# Patient Record
Sex: FEMALE | Race: BLACK OR AFRICAN AMERICAN | Employment: FULL TIME | URBAN - METROPOLITAN AREA
[De-identification: names, ages, dates, MRNs, and addresses within clinical notes are randomized per-mention and may not be internally consistent; named-entity substitution may affect disease eponyms.]

---

## 2018-09-12 ENCOUNTER — HOSPITAL ENCOUNTER (EMERGENCY)
Age: 53
Discharge: HOME | End: 2018-09-12
Attending: EMERGENCY MEDICINE
Payer: MEDICAID

## 2018-09-12 VITALS
WEIGHT: 194.8 LBS | DIASTOLIC BLOOD PRESSURE: 79 MMHG | HEIGHT: 63 IN | SYSTOLIC BLOOD PRESSURE: 134 MMHG | RESPIRATION RATE: 16 BRPM | HEART RATE: 82 BPM | BODY MASS INDEX: 34.52 KG/M2 | OXYGEN SATURATION: 100 % | TEMPERATURE: 98.3 F

## 2018-09-12 DIAGNOSIS — H10.9 CONJUNCTIVITIS OF LEFT EYE, UNSPECIFIED CONJUNCTIVITIS TYPE: Primary | ICD-10-CM

## 2018-09-12 PROCEDURE — 99282 EMERGENCY DEPT VISIT SF MDM: CPT

## 2018-09-12 RX ORDER — TOBRAMYCIN 3 MG/ML
1 SOLUTION/ DROPS OPHTHALMIC
Qty: 5 ML | Refills: 0 | Status: SHIPPED | OUTPATIENT
Start: 2018-09-12 | End: 2018-09-17

## 2018-09-12 ASSESSMENT — PAIN DESCRIPTION - DESCRIPTORS: DESCRIPTORS: BURNING

## 2018-09-12 ASSESSMENT — VISUAL ACUITY
OD: 20/40
OS: 20/200
OU: 20/40

## 2018-09-12 ASSESSMENT — PAIN DESCRIPTION - PAIN TYPE: TYPE: ACUTE PAIN

## 2018-09-12 ASSESSMENT — PAIN SCALES - GENERAL: PAINLEVEL_OUTOF10: 8

## 2018-09-12 ASSESSMENT — PAIN DESCRIPTION - LOCATION: LOCATION: EYE

## 2018-09-12 NOTE — ED PROVIDER NOTES
TRIAGE CHIEF COMPLAINT:   Chief Complaint   Patient presents with    Conjunctivitis     redness drainage to left eye  granddaughter has pink eye         HPI: Janay Batista is a 52 y.o. female who presents to the Emergency Department with complaint of Waking up this morning with pain, irritation and crusting of the left eye. Yesterday she took her granddaughter to the doctor and her granddaughter has pinkeye. The patient does not wear contact lenses. She complains of some slight visual blurring in the left eye. Denies any symptoms in the right eye. No history of injury to her eye. Denies photophobia. Denies recent URI symptoms. The patient does were glasses for distant vision. REVIEW OF SYSTEMS:  6 systems reviewed. Pertinent positives per HPI. Otherwise noted to be negative. Nursing notes reviewed and agree with above. Past medical/surgical history reviewed. ALLERGIES   Allergies   Allergen Reactions    Latex     Doxycycline Anaphylaxis    Flagyl [Metronidazole] Anaphylaxis    Ibuprofen Anaphylaxis    Iv Dye [Iodides] Anaphylaxis         /79   Pulse 82   Temp 98.3 °F (36.8 °C) (Oral)   Resp 16   Ht 5' 3\" (1.6 m)   Wt 88.4 kg (194 lb 12.8 oz)   SpO2 100%   BMI 34.51 kg/m²   General:  No acute distress. Non toxic appearance  Head:   Normocephalic and atraumatic  Eyes:    EOM's intact. Sclera anicteric. LOLY. Mild conjunctival injection of the left eye without chemosis. No foreign body seen on full inspection. Dye shows no evidence of abrasion. No evidence of stye. ENT:   Mucous membranes moist  Neck:   Supple. No adenopathy or jugular venous distension  Lungs/Chest:  No respiratory distress  CVS:   Regular rate and rhythm  Abdomen:  Deferred  Extremities:  Full range of motion  Skin:   No rashes or lesions to exposed skin  Back:   Deferred  Neuro:  Alert and OX3. Speech clear. No focal weakness.         Gait normal.  Psych:   Affect normal. Mood normal        RADIOLOGY:  na    LAB      ED COURSE / MDM:  57-year-old female woke up this morning with some irritation, pain and crusting of the left eye. She states her granddaughter has pinkeye. She does not wear contact lenses. No history of injury. Visual acuity is 20/40 with glasses in each eye. No foreign body. No evidence of abrasion. No periorbital swelling. This may represent early conjunctivitis. Will treat with antibiotic drops, cool compresses and eye rest.      I discussed with Kashif Thomas the results of evaluation in the Emergency Department, diagnosis, care and prognosis. The plan is to discharge to home. The patient is in agreement with the plan and questions have been answered. I also discussed with Kashif Thomas the reasons which may require a return visit and the importance of follow-up care.         FINAL IMPRESSION:  1 -- Conjunctivitis left eye                     Honey Abrams MD  09/12/18 6701

## 2019-01-06 ENCOUNTER — HOSPITAL ENCOUNTER (EMERGENCY)
Age: 54
Discharge: HOME | End: 2019-01-06
Attending: EMERGENCY MEDICINE
Payer: MEDICAID

## 2019-01-06 ENCOUNTER — APPOINTMENT (OUTPATIENT)
Dept: GENERAL RADIOLOGY | Age: 54
End: 2019-01-06
Payer: MEDICAID

## 2019-01-06 VITALS
TEMPERATURE: 98.7 F | SYSTOLIC BLOOD PRESSURE: 133 MMHG | DIASTOLIC BLOOD PRESSURE: 74 MMHG | HEART RATE: 91 BPM | WEIGHT: 196 LBS | OXYGEN SATURATION: 98 % | HEIGHT: 63 IN | BODY MASS INDEX: 34.73 KG/M2
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
## 2019-01-06 DIAGNOSIS — S39.012A STRAIN OF LUMBAR REGION, INITIAL ENCOUNTER: ICD-10-CM

## 2019-01-06 DIAGNOSIS — M19.90 ARTHRITIS: ICD-10-CM

## 2019-01-06 DIAGNOSIS — M25.559 ARTHRALGIA OF HIP, UNSPECIFIED LATERALITY: ICD-10-CM

## 2019-01-06 PROCEDURE — 6370000000 HC RX 637 (ALT 250 FOR IP): Performed by: EMERGENCY MEDICINE

## 2019-01-06 PROCEDURE — 72100 X-RAY EXAM L-S SPINE 2/3 VWS: CPT

## 2019-01-06 PROCEDURE — 73502 X-RAY EXAM HIP UNI 2-3 VIEWS: CPT

## 2019-01-06 PROCEDURE — 99283 EMERGENCY DEPT VISIT LOW MDM: CPT

## 2019-01-06 RX ORDER — ACETAMINOPHEN 500 MG
1000 TABLET ORAL ONCE
Status: COMPLETED | OUTPATIENT
Start: 2019-01-06 | End: 2019-01-06

## 2019-01-06 RX ADMIN — ACETAMINOPHEN 1000 MG: 500 TABLET, FILM COATED ORAL at 19:15

## 2019-01-06 ASSESSMENT — PAIN DESCRIPTION - PAIN TYPE: TYPE: ACUTE PAIN

## 2019-01-06 ASSESSMENT — PAIN DESCRIPTION - ORIENTATION: ORIENTATION: LEFT

## 2019-01-06 ASSESSMENT — PAIN SCALES - GENERAL: PAINLEVEL_OUTOF10: 9

## 2019-01-06 ASSESSMENT — PAIN DESCRIPTION - DESCRIPTORS: DESCRIPTORS: TIGHTNESS

## 2019-01-06 ASSESSMENT — PAIN DESCRIPTION - LOCATION: LOCATION: BACK

## 2020-07-08 ENCOUNTER — APPOINTMENT (OUTPATIENT)
Dept: GENERAL RADIOLOGY | Age: 55
DRG: 192 | End: 2020-07-08
Payer: MEDICAID

## 2020-07-08 ENCOUNTER — APPOINTMENT (OUTPATIENT)
Dept: CT IMAGING | Age: 55
DRG: 192 | End: 2020-07-08
Payer: MEDICAID

## 2020-07-08 ENCOUNTER — HOSPITAL ENCOUNTER (INPATIENT)
Age: 55
LOS: 2 days | Discharge: HOME | DRG: 192 | End: 2020-07-13
Attending: EMERGENCY MEDICINE | Admitting: INTERNAL MEDICINE
Payer: MEDICAID

## 2020-07-08 PROBLEM — R07.9 CHEST PAIN: Status: ACTIVE | Noted: 2020-07-08

## 2020-07-08 LAB
A/G RATIO: 1.7 (ref 1.1–2.2)
ALBUMIN SERPL-MCNC: 4.6 G/DL (ref 3.4–5)
ALP BLD-CCNC: 84 U/L (ref 40–129)
ALT SERPL-CCNC: 16 U/L (ref 10–40)
ANION GAP SERPL CALCULATED.3IONS-SCNC: 13 MMOL/L (ref 3–16)
AST SERPL-CCNC: 20 U/L (ref 15–37)
BASOPHILS ABSOLUTE: 0 K/UL (ref 0–0.2)
BASOPHILS RELATIVE PERCENT: 0.7 %
BILIRUB SERPL-MCNC: 0.8 MG/DL (ref 0–1)
BUN BLDV-MCNC: 12 MG/DL (ref 7–20)
CALCIUM SERPL-MCNC: 9.7 MG/DL (ref 8.3–10.6)
CHLORIDE BLD-SCNC: 106 MMOL/L (ref 99–110)
CO2: 25 MMOL/L (ref 21–32)
CREAT SERPL-MCNC: 0.6 MG/DL (ref 0.6–1.1)
EOSINOPHILS ABSOLUTE: 0.2 K/UL (ref 0–0.6)
EOSINOPHILS RELATIVE PERCENT: 3 %
GFR AFRICAN AMERICAN: >60
GFR NON-AFRICAN AMERICAN: >60
GLOBULIN: 2.7 G/DL
GLUCOSE BLD-MCNC: 129 MG/DL (ref 70–99)
GLUCOSE BLD-MCNC: 187 MG/DL (ref 70–99)
HCT VFR BLD CALC: 35.2 % (ref 36–48)
HEMOGLOBIN: 11.7 G/DL (ref 12–16)
INR BLD: 1.05 (ref 0.86–1.14)
LYMPHOCYTES ABSOLUTE: 2.2 K/UL (ref 1–5.1)
LYMPHOCYTES RELATIVE PERCENT: 31.9 %
MCH RBC QN AUTO: 30.6 PG (ref 26–34)
MCHC RBC AUTO-ENTMCNC: 33.2 G/DL (ref 31–36)
MCV RBC AUTO: 92.2 FL (ref 80–100)
MONOCYTES ABSOLUTE: 0.5 K/UL (ref 0–1.3)
MONOCYTES RELATIVE PERCENT: 8 %
NEUTROPHILS ABSOLUTE: 3.8 K/UL (ref 1.7–7.7)
NEUTROPHILS RELATIVE PERCENT: 56.4 %
PDW BLD-RTO: 12.6 % (ref 12.4–15.4)
PERFORMED ON: ABNORMAL
PLATELET # BLD: 289 K/UL (ref 135–450)
PMV BLD AUTO: 8.3 FL (ref 5–10.5)
POTASSIUM SERPL-SCNC: 4.2 MMOL/L (ref 3.5–5.1)
PROTHROMBIN TIME: 12.2 SEC (ref 10–13.2)
RBC # BLD: 3.81 M/UL (ref 4–5.2)
SODIUM BLD-SCNC: 144 MMOL/L (ref 136–145)
TOTAL PROTEIN: 7.3 G/DL (ref 6.4–8.2)
TROPONIN: <0.01 NG/ML
WBC # BLD: 6.8 K/UL (ref 4–11)

## 2020-07-08 PROCEDURE — 71046 X-RAY EXAM CHEST 2 VIEWS: CPT

## 2020-07-08 PROCEDURE — 99285 EMERGENCY DEPT VISIT HI MDM: CPT

## 2020-07-08 PROCEDURE — 70450 CT HEAD/BRAIN W/O DYE: CPT

## 2020-07-08 PROCEDURE — 93005 ELECTROCARDIOGRAM TRACING: CPT | Performed by: EMERGENCY MEDICINE

## 2020-07-08 PROCEDURE — 85025 COMPLETE CBC W/AUTO DIFF WBC: CPT

## 2020-07-08 PROCEDURE — 85610 PROTHROMBIN TIME: CPT

## 2020-07-08 PROCEDURE — 80053 COMPREHEN METABOLIC PANEL: CPT

## 2020-07-08 PROCEDURE — U0003 INFECTIOUS AGENT DETECTION BY NUCLEIC ACID (DNA OR RNA); SEVERE ACUTE RESPIRATORY SYNDROME CORONAVIRUS 2 (SARS-COV-2) (CORONAVIRUS DISEASE [COVID-19]), AMPLIFIED PROBE TECHNIQUE, MAKING USE OF HIGH THROUGHPUT TECHNOLOGIES AS DESCRIBED BY CMS-2020-01-R: HCPCS

## 2020-07-08 PROCEDURE — G0378 HOSPITAL OBSERVATION PER HR: HCPCS

## 2020-07-08 PROCEDURE — 84484 ASSAY OF TROPONIN QUANT: CPT

## 2020-07-08 PROCEDURE — 6370000000 HC RX 637 (ALT 250 FOR IP): Performed by: EMERGENCY MEDICINE

## 2020-07-08 RX ORDER — CETIRIZINE HYDROCHLORIDE 10 MG/1
10 TABLET ORAL DAILY
Status: DISCONTINUED | OUTPATIENT
Start: 2020-07-09 | End: 2020-07-13 | Stop reason: HOSPADM

## 2020-07-08 RX ORDER — NITROGLYCERIN 0.4 MG/1
0.4 TABLET SUBLINGUAL EVERY 5 MIN PRN
Status: DISCONTINUED | OUTPATIENT
Start: 2020-07-08 | End: 2020-07-13 | Stop reason: HOSPADM

## 2020-07-08 RX ORDER — LOSARTAN POTASSIUM 100 MG/1
100 TABLET ORAL DAILY
Status: DISCONTINUED | OUTPATIENT
Start: 2020-07-09 | End: 2020-07-13 | Stop reason: HOSPADM

## 2020-07-08 RX ORDER — ASPIRIN 81 MG/1
324 TABLET, CHEWABLE ORAL ONCE
Status: COMPLETED | OUTPATIENT
Start: 2020-07-08 | End: 2020-07-08

## 2020-07-08 RX ORDER — TROPICAMIDE 10 MG/ML
1 SOLUTION/ DROPS OPHTHALMIC 3 TIMES DAILY
Status: DISCONTINUED | OUTPATIENT
Start: 2020-07-08 | End: 2020-07-08

## 2020-07-08 RX ORDER — POLYETHYLENE GLYCOL 3350 17 G/17G
17 POWDER, FOR SOLUTION ORAL DAILY PRN
Status: DISCONTINUED | OUTPATIENT
Start: 2020-07-08 | End: 2020-07-13 | Stop reason: HOSPADM

## 2020-07-08 RX ORDER — DULOXETIN HYDROCHLORIDE 60 MG/1
60 CAPSULE, DELAYED RELEASE ORAL 2 TIMES DAILY
Status: DISCONTINUED | OUTPATIENT
Start: 2020-07-08 | End: 2020-07-08

## 2020-07-08 RX ORDER — PREGABALIN 50 MG/1
50 CAPSULE ORAL 2 TIMES DAILY
Status: DISCONTINUED | OUTPATIENT
Start: 2020-07-08 | End: 2020-07-13 | Stop reason: HOSPADM

## 2020-07-08 RX ORDER — ASPIRIN 81 MG/1
81 TABLET, CHEWABLE ORAL DAILY
COMMUNITY

## 2020-07-08 RX ORDER — DOCUSATE SODIUM 100 MG/1
100 CAPSULE, LIQUID FILLED ORAL 3 TIMES DAILY
Status: DISCONTINUED | OUTPATIENT
Start: 2020-07-08 | End: 2020-07-13 | Stop reason: HOSPADM

## 2020-07-08 RX ORDER — LATANOPROST 50 UG/ML
1 SOLUTION/ DROPS OPHTHALMIC NIGHTLY
Status: DISCONTINUED | OUTPATIENT
Start: 2020-07-08 | End: 2020-07-08

## 2020-07-08 RX ORDER — DEXTROSE MONOHYDRATE 25 G/50ML
12.5 INJECTION, SOLUTION INTRAVENOUS PRN
Status: DISCONTINUED | OUTPATIENT
Start: 2020-07-08 | End: 2020-07-13 | Stop reason: HOSPADM

## 2020-07-08 RX ORDER — DEXTROSE MONOHYDRATE 50 MG/ML
100 INJECTION, SOLUTION INTRAVENOUS PRN
Status: DISCONTINUED | OUTPATIENT
Start: 2020-07-08 | End: 2020-07-13 | Stop reason: HOSPADM

## 2020-07-08 RX ORDER — HYDROCHLOROTHIAZIDE 25 MG/1
25 TABLET ORAL DAILY
Status: DISCONTINUED | OUTPATIENT
Start: 2020-07-09 | End: 2020-07-13 | Stop reason: HOSPADM

## 2020-07-08 RX ORDER — PROMETHAZINE HYDROCHLORIDE 25 MG/1
12.5 TABLET ORAL EVERY 6 HOURS PRN
Status: DISCONTINUED | OUTPATIENT
Start: 2020-07-08 | End: 2020-07-13 | Stop reason: HOSPADM

## 2020-07-08 RX ORDER — MORPHINE SULFATE 2 MG/ML
2 INJECTION, SOLUTION INTRAMUSCULAR; INTRAVENOUS EVERY 4 HOURS PRN
Status: ACTIVE | OUTPATIENT
Start: 2020-07-08 | End: 2020-07-09

## 2020-07-08 RX ORDER — SODIUM CHLORIDE 0.9 % (FLUSH) 0.9 %
10 SYRINGE (ML) INJECTION PRN
Status: DISCONTINUED | OUTPATIENT
Start: 2020-07-08 | End: 2020-07-13 | Stop reason: HOSPADM

## 2020-07-08 RX ORDER — INSULIN LISPRO 100 [IU]/ML
0-6 INJECTION, SOLUTION INTRAVENOUS; SUBCUTANEOUS
Status: DISCONTINUED | OUTPATIENT
Start: 2020-07-09 | End: 2020-07-10

## 2020-07-08 RX ORDER — SODIUM CHLORIDE 0.9 % (FLUSH) 0.9 %
10 SYRINGE (ML) INJECTION EVERY 12 HOURS SCHEDULED
Status: DISCONTINUED | OUTPATIENT
Start: 2020-07-08 | End: 2020-07-13 | Stop reason: HOSPADM

## 2020-07-08 RX ORDER — NICOTINE POLACRILEX 4 MG
15 LOZENGE BUCCAL PRN
Status: DISCONTINUED | OUTPATIENT
Start: 2020-07-08 | End: 2020-07-13 | Stop reason: HOSPADM

## 2020-07-08 RX ORDER — ONDANSETRON 2 MG/ML
4 INJECTION INTRAMUSCULAR; INTRAVENOUS EVERY 6 HOURS PRN
Status: DISCONTINUED | OUTPATIENT
Start: 2020-07-08 | End: 2020-07-13 | Stop reason: HOSPADM

## 2020-07-08 RX ORDER — BUDESONIDE AND FORMOTEROL FUMARATE DIHYDRATE 160; 4.5 UG/1; UG/1
2 AEROSOL RESPIRATORY (INHALATION) 2 TIMES DAILY
Status: DISCONTINUED | OUTPATIENT
Start: 2020-07-08 | End: 2020-07-08

## 2020-07-08 RX ORDER — PANTOPRAZOLE SODIUM 40 MG/1
40 TABLET, DELAYED RELEASE ORAL
Status: DISCONTINUED | OUTPATIENT
Start: 2020-07-09 | End: 2020-07-13 | Stop reason: HOSPADM

## 2020-07-08 RX ORDER — ASPIRIN 81 MG/1
81 TABLET, CHEWABLE ORAL DAILY
Status: DISCONTINUED | OUTPATIENT
Start: 2020-07-09 | End: 2020-07-13 | Stop reason: HOSPADM

## 2020-07-08 RX ORDER — ACETAMINOPHEN 650 MG/1
650 SUPPOSITORY RECTAL EVERY 6 HOURS PRN
Status: DISCONTINUED | OUTPATIENT
Start: 2020-07-08 | End: 2020-07-13 | Stop reason: SDUPTHER

## 2020-07-08 RX ORDER — ACETAMINOPHEN 325 MG/1
650 TABLET ORAL EVERY 6 HOURS PRN
Status: DISCONTINUED | OUTPATIENT
Start: 2020-07-08 | End: 2020-07-13 | Stop reason: SDUPTHER

## 2020-07-08 RX ORDER — MONTELUKAST SODIUM 10 MG/1
10 TABLET ORAL NIGHTLY
Status: DISCONTINUED | OUTPATIENT
Start: 2020-07-08 | End: 2020-07-08

## 2020-07-08 RX ORDER — ATORVASTATIN CALCIUM 10 MG/1
10 TABLET, FILM COATED ORAL NIGHTLY
Status: DISCONTINUED | OUTPATIENT
Start: 2020-07-08 | End: 2020-07-10

## 2020-07-08 RX ORDER — INSULIN LISPRO 100 [IU]/ML
0-3 INJECTION, SOLUTION INTRAVENOUS; SUBCUTANEOUS NIGHTLY
Status: DISCONTINUED | OUTPATIENT
Start: 2020-07-08 | End: 2020-07-10

## 2020-07-08 RX ORDER — TRAZODONE HYDROCHLORIDE 50 MG/1
50 TABLET ORAL NIGHTLY
Status: DISCONTINUED | OUTPATIENT
Start: 2020-07-08 | End: 2020-07-13 | Stop reason: HOSPADM

## 2020-07-08 RX ADMIN — NITROGLYCERIN 1 INCH: 20 OINTMENT TOPICAL at 18:36

## 2020-07-08 RX ADMIN — ASPIRIN 324 MG: 81 TABLET, CHEWABLE ORAL at 18:37

## 2020-07-08 ASSESSMENT — PAIN SCALES - GENERAL
PAINLEVEL_OUTOF10: 0
PAINLEVEL_OUTOF10: 0

## 2020-07-08 NOTE — ED NOTES
Report to [facility].      Leeann Figueroa RN  07/08/20 4920

## 2020-07-08 NOTE — ED PROVIDER NOTES
[facility]  EMERGENCY DEPT VISIT      Patient Identification  Anastcaia Burgess is a 60 y.o. female. Chief Complaint   Chest Pain      History of Present Illness: This is a  60 y.o. female who presents ambulatory to the ED with complaints of 1 week history of off-and-on left-sided chest pain which radiates through to her shoulder blade. This is been associated with left-sided neck pain which radiates down to the left shoulder and into the arm. She states that her left arm sometimes feels numb and as of her hand is weak. She denies any paresthesias or weakness to her face or leg. Symptoms only involve the left arm. She denies visual changes. No trouble talking. No vertigo or gait disturbance. Patient denies any exacerbating or relieving factors for chest pain or arm symptoms but they do seem to come together. She denies any headache. No diaphoresis. No nausea or vomiting. Patient does have history of hypertension, high cholesterol, and diabetes, polyneuropathy, as well as family history of heart disease. She does not smoke. She states that she was told that she had a mini stroke in the past with right sided numbness but also reports a history of what sounds like possible complicated migraines. Chest pain symptoms last for several hours at a time. Currently her chest pain is rated 2 out of 10. She denies any URI symptoms or fever. Past Medical History:   Diagnosis Date    Asthma     Bipolar 1 disorder ([address]     Chronic back pain     DDD (degenerative disc disease)     Depression     Diabetes mellitus (HCC)     Fibromyalgia     Hyperlipidemia     Hypertension        Past Surgical History:   Procedure Laterality Date    CARPAL TUNNEL RELEASE      HAND SURGERY      HYSTERECTOMY         No current facility-administered medications for this encounter.      Current Outpatient Medications:     Mometasone Furo-Formoterol Fum (DULERA IN), Inhale into the lungs, Disp: , Rfl:     aspirin 81 MG chewable tablet, Take 81 mg by mouth daily, Disp: , Rfl:     pregabalin (LYRICA) 50 MG capsule, Take 50 mg by mouth 2 times daily. ., Disp: , Rfl:     atorvastatin (LIPITOR) 10 MG tablet, Take 10 mg by mouth daily, Disp: , Rfl:     sucralfate (CARAFATE) 1 GM tablet, Take 1 tablet by mouth 4 times daily, Disp: 40 tablet, Rfl: 0    insulin glargine (LANTUS) 100 UNIT/ML injection vial, Inject 43 Units into the skin nightly (Patient taking differently: Inject 55 Units into the skin nightly ), Disp: 1 vial, Rfl: 3    insulin lispro (HUMALOG KWIKPEN) 100 UNIT/ML pen, Inject 8 Units into the skin 3 times daily (before meals) Please dispense appropriate amount of pen needles, Disp: 5 Pen, Rfl: 1    metFORMIN (GLUCOPHAGE) 500 MG tablet, Take 2 tablets by mouth 2 times daily (with meals). , Disp: 60 tablet, Rfl: 3    albuterol (PROVENTIL HFA;VENTOLIN HFA) 108 (90 BASE) MCG/ACT inhaler, Inhale 2 puffs into the lungs every 6 hours as needed for Wheezing., Disp: , Rfl:     DULoxetine (CYMBALTA) 60 MG capsule, Take 60 mg by mouth 2 times daily. , Disp: , Rfl:     hydrochlorothiazide (HYDRODIURIL) 25 MG tablet, Take 25 mg by mouth daily. , Disp: , Rfl:     losartan (COZAAR) 100 MG tablet, Take 100 mg by mouth daily. , Disp: , Rfl:     meclizine (ANTIVERT) 25 MG tablet, Take 25 mg by mouth 3 times daily as needed. , Disp: , Rfl:     montelukast (SINGULAIR) 10 MG tablet, Take 10 mg by mouth nightly., Disp: , Rfl:     tiotropium (SPIRIVA) 18 MCG inhalation capsule, Inhale 18 mcg into the lungs daily. , Disp: , Rfl:     spironolactone (ALDACTONE) 50 MG tablet, Take 50 mg by mouth 2 times daily. , Disp: , Rfl:     traZODone (DESYREL) 50 MG tablet, Take 50 mg by mouth nightly., Disp: , Rfl:     cetirizine (ZYRTEC) 10 MG tablet, Take 10 mg by mouth daily, Disp: , Rfl:     beclomethasone (QVAR) 80 MCG/ACT inhaler, Inhale 2 puffs into the lungs 2 times daily. , Disp: , Rfl:     docusate sodium (COLACE) 100 MG capsule, Take 100 mg Minutes per session: Not on file    Stress: Not on file   Relationships    Social connections     Talks on phone: Not on file     Gets together: Not on file     Attends Orthodoxy service: Not on file     Active member of club or organization: Not on file     Attends meetings of clubs or organizations: Not on file     Relationship status: Not on file    Intimate partner violence     Fear of current or ex partner: Not on file     Emotionally abused: Not on file     Physically abused: Not on file     Forced sexual activity: Not on file   Other Topics Concern    Not on file   Social History Narrative    Not on file       Nursing Notes Reviewed      ROS:  GENERAL:  No fever, no chills, no diaphoresis, no appetite changes  EYES: no eye discharge, no eye redness, no visual changes  ENT: no nasal congestion, no sore throat  CARDIAC: + chest pain,  no leg swelling  PULM: no cough, no shortness of breath  ABD: no abdominal pain, no nausea, no vomiting, no diarrhea  : no dysuria, no hematuria, no urgency, no frequency. No flank pain  MUSCULOSKELETAL: + back pain, no arthralgias, no myalgias  NEURO: no headache, no lightheadedness, no dizziness, + numbness, + weakness, no syncope, no confusion, no speech difficulty  SKIN: no rashes, no erythema, no wounds, no ecchymosis      PHYSICAL EXAM:  GENERAL APPEARANCE: Nenita Emmanuel is in no acute respiratory distress. Awake and alert. VITAL SIGNS:   ED Triage Vitals [07/08/20 1725]   Enc Vitals Group      BP (!) 158/102      Pulse 75      Resp 18      Temp 98.7 °F (37.1 °C)      Temp Source Oral      SpO2 100 %      Weight 190 lb 12.8 oz (86.5 kg)      Height 5' 2\" (1.575 m)      Head Circumference       Peak Flow       Pain Score       Pain Loc       Pain Edu? Excl. in [address] HEAD: Normocephalic, atraumatic. EYES:  Extraocular muscles are intact. Pupils equal round and reactive to light. Conjunctivas are pink. Negative scleral icterus.    ENT:  Mucous membranes are moist.  Pharynx without erythema or exudates. NECK: Nontender and supple. No cervical adenopathy. CHEST:  Clear to auscultation bilaterally. No rales, rhonchi, or wheezing. HEART:  Regular rate and regular rhythm. No murmurs. Strong and equal pulses in the upper and lower extremities. ABDOMEN: Soft,  nondistended, positive bowel sounds. abdomen is nontender. No rebound. no guarding. MUSCULOSKELETAL: The calves are nontender to palpation. Active range of motion of the upper and lower extremities. No edema. NEUROLOGICAL: Awake, alert and oriented x 3. Power intact in the upper and lower extremities but with very scant lesser left  strength. Left biceps/triceps, shoulder strength 5/5. Sensation is intact to light touch in the upper and lower extremities and all fingers. Cranial Nerves 2-12 are intact. No truncal ataxia. No dysarthria or aphasia. Normal finger to nose. NIH Stroke Scale   Time: 1750  Person Administering Scale: MARK SANDHU   Administer stroke scale items in the order listed. Record performance in each category after each subscale exam. Do not go back and change scores. Follow directions provided for each exam technique. Scores should reflect what the patient does, not what the clinician thinks the patient can do. The clinician should record answers while administering the exam and work quickly. Except where indicated, the patient should not be coached (i.e., repeated requests to patient to make a special effort). 1a  Level of consciousness: 0=alert; keenly responsive   1b. LOC questions:  0=Performs both tasks correctly   1c. LOC commands: 0=Performs both tasks correctly   2. Best Gaze: 0=normal   3. Visual: 0=No visual loss   4. Facial Palsy: 0=Normal symmetric movement   5a. Motor left arm: 0=No drift, limb holds 90 (or 45) degrees for full 10 seconds   5b.   Motor right arm: 0=No drift, limb holds 90 (or 45) degrees for full 10 seconds   6a. motor left le=No drift, limb holds 90 (or 45) degrees for full 10 seconds   6b  Motor right le=No drift, limb holds 90 (or 45) degrees for full 10 seconds   7. Limb Ataxia: 0=Absent   8. Sensory: 0=Normal; no sensory loss   9. Best Language:  0=No aphasia, normal   10. Dysarthria: 0=Normal   11. Extinction and Inattention: 0=No abnormality         Total:  0       DERMATOLOGIC: No petechiae, rashes, or ecchymoses. No erythema. PSYCH: normal mood and affect. Normal thought content. ED COURSE AND MEDICAL DECISION MAKING:    EKG as interpreted by myself:  normal sinus rhythm with a rate of 75  Axis is   Normal  QTc is  normal  Intervals and Durations are unremarkable. No specific ST-T wave changes appreciated. No evidence of acute ischemia. Compared to prior EKG dated 18, no significant change    Radiology:  Films have been read by radiologist as noted in chart unless otherwise stated. Other radiologic studies (i.e. CT, MRI, ultrasounds, etc ) have been interpreted by radiologist.     Euell Postin   Final Result   1. Normal noncontrast CT scan of the head. XR CHEST STANDARD (2 VW)   Final Result   1. No evidence of acute cardiopulmonary disease.            Labs:  Results for orders placed or performed during the hospital encounter of 20   CBC Auto Differential   Result Value Ref Range    WBC 6.8 4.0 - 11.0 K/uL    RBC 3.81 (L) 4.00 - 5.20 M/uL    Hemoglobin 11.7 (L) 12.0 - 16.0 g/dL    Hematocrit 35.2 (L) 36.0 - 48.0 %    MCV 92.2 80.0 - 100.0 fL    MCH 30.6 26.0 - 34.0 pg    MCHC 33.2 31.0 - 36.0 g/dL    RDW 12.6 12.4 - 15.4 %    Platelets 177 662 - 007 K/uL    MPV 8.3 5.0 - 10.5 fL    Neutrophils % 56.4 %    Lymphocytes % 31.9 %    Monocytes % 8.0 %    Eosinophils % 3.0 %    Basophils % 0.7 %    Neutrophils Absolute 3.8 1.7 - 7.7 K/uL    Lymphocytes Absolute 2.2 1.0 - 5.1 K/uL    Monocytes Absolute 0.5 0.0 - 1.3 K/uL    Eosinophils Absolute 0.2 0.0 - 0.6 K/uL    Basophils Absolute 0.0 0.0 - 0.2 K/uL Comprehensive Metabolic Panel   Result Value Ref Range    Sodium 144 136 - 145 mmol/L    Potassium 4.2 3.5 - 5.1 mmol/L    Chloride 106 99 - 110 mmol/L    CO2 25 21 - 32 mmol/L    Anion Gap 13 3 - 16    Glucose 187 (H) 70 - 99 mg/dL    BUN 12 7 - 20 mg/dL    CREATININE 0.6 0.6 - 1.1 mg/dL    GFR Non-African American >60 >60    GFR African American >60 >60    Calcium 9.7 8.3 - 10.6 mg/dL    Total Protein 7.3 6.4 - 8.2 g/dL    Alb 4.6 3.4 - 5.0 g/dL    Albumin/Globulin Ratio 1.7 1.1 - 2.2    Total Bilirubin 0.8 0.0 - 1.0 mg/dL    Alkaline Phosphatase 84 40 - 129 U/L    ALT 16 10 - 40 U/L    AST 20 15 - 37 U/L    Globulin 2.7 g/dL   Troponin   Result Value Ref Range    Troponin <0.01 <0.01 ng/mL   Protime-INR   Result Value Ref Range    Protime 12.2 10.0 - 13.2 sec    INR 1.05 0.86 - 1.14   EKG 12 Lead   Result Value Ref Range    Ventricular Rate 75 BPM    Atrial Rate 75 BPM    P-R Interval 160 ms    QRS Duration 94 ms    Q-T Interval 400 ms    QTc Calculation (Bazett) 446 ms    P Axis 69 degrees    R Axis 10 degrees    T Axis 34 degrees    Diagnosis Normal sinus rhythmNormal ECG        Treatment in the department:  Patient received the following while in the ED. Medications   aspirin chewable tablet 324 mg (324 mg Oral Given 7/8/20 1837)   nitroglycerin (NITRO-BID) 2 % ointment 1 inch (1 inch Topical Given 7/8/20 1836)         Medical decision making:  Patient presents emergency department with off-and-on chest pain radiating to the back and left arm for 1 week. She has a heart score of 4. Multiple risk factors. No recent stress test.  No prior angiogram.  She is hemodynamically stable. EKG is nonischemic. Initial troponin is negative. It is intermittent pain however. I feel she would benefit from stress testing. Patient does report some numbness to the left arm associated with the chest pain as well as a feeling of weakness in the left hand.   Neurologic exam however shows intact sensation with this and only scant decreased  strength on the left with all other left sided strength intact and symmetric. CT of the head was unremarkable. I think this is unlikely to be stroke and again the symptoms been waxing and waning for 1 week. She has a history of polyneuropathy as well as cervical disc disease which could also be contributing or this could be referred pain from the chest pain. HEART SCORE:    History: +1 for moderate suspicion  EKG: +0 for normal EKG   Age: +1 for age 44-72 years  Risk factors (includes HLD, HTN, DM, tobacco use, obesity, and +FHx): +2 for known CAD or 3+ risk factors  Initial troponin: +0 for negative troponin    Heart score: 4. This falls under the following category: Score of 4-6, which indicates low/moderate risk for major adverse cardiac event and supports observation with repeated troponins and/or non-invasive testing      I spoke with Dr. David Roldan. We thoroughly discussed the history, physical exam, laboratory and imaging studies, as well as, emergency department course. Based upon that discussion, we've decided to [facility] for further observation and evaluation ofEna Jiang's chest pain. As I have deemed necessary from their history, physical, and studies, I have considered and evaluatedEna Matthews for the following diagnoses:  ACUTE CORONARY SYNDROME, PERICARDIAL TAMPONADE, PNEUMOTHORAX, PULMONARY EMBOLISM, and THORACIC DISSECTION, PNEUMONIA, PERICARDITIS. Clinical Impression:  1. Chest pain, unspecified type        Dispo:  Patient will be  admitted at this time. Patient was informed of this decision and agrees with plan. I have discussed lab and xray findings with patient and they understand. Questions were answered to the best of my ability. Discharge vitals:  Blood pressure (!) 152/65, pulse 62, temperature 98.7 °F (37.1 °C), temperature source Oral, resp.  rate 17, height 5' 2\" (1.575 m), weight 86.5 kg (190 lb 12.8 oz), SpO2 100 %, not currently breastfeeding. Prescriptions given:   New Prescriptions    No medications on file       This chart was created using Dragon voice recognition software.         Mario Riddle MD  07/08/20 2848

## 2020-07-09 LAB
ANION GAP SERPL CALCULATED.3IONS-SCNC: 10 MMOL/L (ref 3–16)
BUN BLDV-MCNC: 11 MG/DL (ref 7–20)
CALCIUM SERPL-MCNC: 9.3 MG/DL (ref 8.3–10.6)
CHLORIDE BLD-SCNC: 105 MMOL/L (ref 99–110)
CHOLESTEROL, TOTAL: 152 MG/DL (ref 0–199)
CO2: 24 MMOL/L (ref 21–32)
CREAT SERPL-MCNC: 0.7 MG/DL (ref 0.6–1.1)
EKG ATRIAL RATE: 75 BPM
EKG DIAGNOSIS: NORMAL
EKG P AXIS: 69 DEGREES
EKG P-R INTERVAL: 160 MS
EKG Q-T INTERVAL: 400 MS
EKG QRS DURATION: 94 MS
EKG QTC CALCULATION (BAZETT): 446 MS
EKG R AXIS: 10 DEGREES
EKG T AXIS: 34 DEGREES
EKG VENTRICULAR RATE: 75 BPM
GFR AFRICAN AMERICAN: >60
GFR NON-AFRICAN AMERICAN: >60
GLUCOSE BLD-MCNC: 119 MG/DL (ref 70–99)
GLUCOSE BLD-MCNC: 120 MG/DL (ref 70–99)
GLUCOSE BLD-MCNC: 160 MG/DL (ref 70–99)
GLUCOSE BLD-MCNC: 167 MG/DL (ref 70–99)
GLUCOSE BLD-MCNC: 226 MG/DL (ref 70–99)
HDLC SERPL-MCNC: 44 MG/DL (ref 40–60)
LDL CHOLESTEROL CALCULATED: 82 MG/DL
PERFORMED ON: ABNORMAL
POTASSIUM REFLEX MAGNESIUM: 3.6 MMOL/L (ref 3.5–5.1)
SODIUM BLD-SCNC: 139 MMOL/L (ref 136–145)
TRIGL SERPL-MCNC: 132 MG/DL (ref 0–150)
TROPONIN: <0.01 NG/ML
VLDLC SERPL CALC-MCNC: 26 MG/DL

## 2020-07-09 PROCEDURE — 80061 LIPID PANEL: CPT

## 2020-07-09 PROCEDURE — G0378 HOSPITAL OBSERVATION PER HR: HCPCS

## 2020-07-09 PROCEDURE — 96372 THER/PROPH/DIAG INJ SC/IM: CPT

## 2020-07-09 PROCEDURE — 36415 COLL VENOUS BLD VENIPUNCTURE: CPT

## 2020-07-09 PROCEDURE — 6360000002 HC RX W HCPCS: Performed by: INTERNAL MEDICINE

## 2020-07-09 PROCEDURE — 2580000003 HC RX 258: Performed by: INTERNAL MEDICINE

## 2020-07-09 PROCEDURE — 84484 ASSAY OF TROPONIN QUANT: CPT

## 2020-07-09 PROCEDURE — 6370000000 HC RX 637 (ALT 250 FOR IP): Performed by: INTERNAL MEDICINE

## 2020-07-09 PROCEDURE — 80048 BASIC METABOLIC PNL TOTAL CA: CPT

## 2020-07-09 PROCEDURE — 93010 ELECTROCARDIOGRAM REPORT: CPT | Performed by: INTERNAL MEDICINE

## 2020-07-09 RX ORDER — POLYETHYLENE GLYCOL 3350 17 G/17G
17 POWDER, FOR SOLUTION ORAL DAILY PRN
Qty: 527 G | Refills: 1 | Status: CANCELLED | OUTPATIENT
Start: 2020-07-09 | End: 2020-08-08

## 2020-07-09 RX ADMIN — INSULIN GLARGINE 25 UNITS: 100 INJECTION, SOLUTION SUBCUTANEOUS at 00:39

## 2020-07-09 RX ADMIN — INSULIN LISPRO 1 UNITS: 100 INJECTION, SOLUTION INTRAVENOUS; SUBCUTANEOUS at 13:13

## 2020-07-09 RX ADMIN — PREGABALIN 50 MG: 50 CAPSULE ORAL at 20:44

## 2020-07-09 RX ADMIN — INSULIN GLARGINE 25 UNITS: 100 INJECTION, SOLUTION SUBCUTANEOUS at 20:47

## 2020-07-09 RX ADMIN — INSULIN LISPRO 1 UNITS: 100 INJECTION, SOLUTION INTRAVENOUS; SUBCUTANEOUS at 18:16

## 2020-07-09 RX ADMIN — PREGABALIN 50 MG: 50 CAPSULE ORAL at 00:37

## 2020-07-09 RX ADMIN — DOCUSATE SODIUM 100 MG: 100 CAPSULE, LIQUID FILLED ORAL at 14:40

## 2020-07-09 RX ADMIN — ATORVASTATIN CALCIUM 10 MG: 10 TABLET, FILM COATED ORAL at 00:38

## 2020-07-09 RX ADMIN — TRAZODONE HYDROCHLORIDE 50 MG: 50 TABLET ORAL at 20:44

## 2020-07-09 RX ADMIN — DOCUSATE SODIUM 100 MG: 100 CAPSULE, LIQUID FILLED ORAL at 20:44

## 2020-07-09 RX ADMIN — PREGABALIN 50 MG: 50 CAPSULE ORAL at 09:17

## 2020-07-09 RX ADMIN — Medication 10 ML: at 20:44

## 2020-07-09 RX ADMIN — Medication 10 ML: at 09:18

## 2020-07-09 RX ADMIN — PANTOPRAZOLE SODIUM 40 MG: 40 TABLET, DELAYED RELEASE ORAL at 06:43

## 2020-07-09 RX ADMIN — HYDROCHLOROTHIAZIDE 25 MG: 25 TABLET ORAL at 09:17

## 2020-07-09 RX ADMIN — ATORVASTATIN CALCIUM 10 MG: 10 TABLET, FILM COATED ORAL at 20:44

## 2020-07-09 RX ADMIN — ENOXAPARIN SODIUM 40 MG: 40 INJECTION SUBCUTANEOUS at 09:17

## 2020-07-09 RX ADMIN — INSULIN LISPRO 2 UNITS: 100 INJECTION, SOLUTION INTRAVENOUS; SUBCUTANEOUS at 20:46

## 2020-07-09 RX ADMIN — DOCUSATE SODIUM 100 MG: 100 CAPSULE, LIQUID FILLED ORAL at 09:17

## 2020-07-09 RX ADMIN — CETIRIZINE HYDROCHLORIDE 10 MG: 10 TABLET, FILM COATED ORAL at 09:16

## 2020-07-09 RX ADMIN — ASPIRIN 81 MG: 81 TABLET, CHEWABLE ORAL at 09:17

## 2020-07-09 RX ADMIN — TRAZODONE HYDROCHLORIDE 50 MG: 50 TABLET ORAL at 00:37

## 2020-07-09 RX ADMIN — DOCUSATE SODIUM 100 MG: 100 CAPSULE, LIQUID FILLED ORAL at 00:49

## 2020-07-09 RX ADMIN — LOSARTAN POTASSIUM 100 MG: 100 TABLET, FILM COATED ORAL at 09:17

## 2020-07-09 RX ADMIN — Medication 10 ML: at 00:38

## 2020-07-09 ASSESSMENT — ENCOUNTER SYMPTOMS
ABDOMINAL PAIN: 0
COLOR CHANGE: 0
EYE PAIN: 0
SINUS PAIN: 0
BACK PAIN: 0
COUGH: 0
DIARRHEA: 0
CONSTIPATION: 1
SHORTNESS OF BREATH: 0

## 2020-07-09 ASSESSMENT — PAIN SCALES - GENERAL
PAINLEVEL_OUTOF10: 0

## 2020-07-09 ASSESSMENT — VISUAL ACUITY: OU: 1

## 2020-07-09 NOTE — PLAN OF CARE
Problem: Respiratory:  Goal: Ability to maintain adequate ventilation will improve  Description: Ability to maintain adequate ventilation will improve  7/9/2020 1500 by Claudia Moody RN  Outcome: Completed   Pt maintains O2 sats in 90s on room air. No SOB, difficulty breathing etc. No adventitious breath sounds present. Problem: Falls - Risk of:  Goal: Will remain free from falls  Description: Will remain free from falls  7/9/2020 1500 by Claudia Moody RN  Outcome: Met This Shift   Pt is up ad david, independent to reposition, get up to her chair, or go to the bathroom. Pt is not a fall risk. Bed in low position, alarm is off, Pt calls appropriately. Problem: Pain:  Goal: Control of acute pain  Description: Control of acute pain  7/9/2020 1500 by Claudia Moody RN  Outcome: Completed   Pt has not complained of any chest pain this shift. Problem: Pain:  Goal: Control of chronic pain  Description: Control of chronic pain  7/9/2020 1500 by Claudia Moody RN  Outcome: Completed   Pt has not complained of any chronic pain.

## 2020-07-09 NOTE — DISCHARGE SUMMARY
INTERNAL MEDICINE DEPARTMENT AT [address] [place] [address]  DISCHARGE SUMMARY    Patient ID: Misha Dennis                                             Discharge Date: 7/13/2020   Patient's PCP: [id number] [place]                                          Discharge Physician: Linda Mendez DO  Admit Date: 7/8/2020   Admitting Physician: Allyssa Waters MD    DISCHARGE DIAGNOSES:  1-Chest pain likely musculoskeletal  2- Type II diabetes  3- Negative Covid-19 test    Hospital Course: Genevive Sandhoff is a 60year old female with PMH of type 2 diabetes mellitus, neuropathy, anemia, migraine, GERD, HLD, bipolar, fibromyalgia, and osteoarthritis who presented with chest pain. Her EKG was consistent with a prior EKG 7/2018 with no ischemic changes. Troponin enzymes were negative (< 0.01). Cardiology was consulted. Patient had NM stress test which revealed mild ischemic risk. Patient had cath angiogram which didn't show any abnormality requiring intervention. It is suspected that chest pain is musculoskeletal in nature given the clinical picture after ruling out ACS. Patient has a history of type II diabetes. Her glucose was closely monitored and insulin was adjusted accordingly. She was discharged on her home dose insulin and to resume metformin in 48 hours. Patient was also tested negative for Covid-19 prior to having the cardiac evaluation. Physical Exam:  BP (!) 145/81   Pulse 86   Temp 98 °F (36.7 °C) (Oral)   Resp 16   Ht 5' 2\" (1.575 m)   Wt 189 lb 2.5 oz (85.8 kg)   SpO2 95%   BMI 34.60 kg/m²   General appearance: alert, appears stated age and cooperative  Head: Normocephalic, without obvious abnormality, atraumatic  Eyes: conjunctivae/corneas clear. PERRL, EOM's intact. Fundi benign. Ears: normal TM's and external ear canals both ears  Nose: Nares normal. Septum midline. Mucosa normal. No drainage or sinus tenderness.   Throat: lips, mucosa, and tongue normal; teeth and gums normal  Neck: no adenopathy, no carotid bruit, no JVD, supple, symmetrical, trachea midline and thyroid not enlarged, symmetric, no tenderness/mass/nodules  Lungs: clear to auscultation bilaterally  Heart: regular rate and rhythm, S1, S2 normal, no murmur, click, rub or gallop  Abdomen: soft, non-tender; bowel sounds normal; no masses,  no organomegaly  Extremities: extremities normal, atraumatic, no cyanosis or edema  Neurologic: Grossly normal    Consults: cardiology  Disposition: home  Discharged Condition: Stable  Follow Up: Primary Care Physician in two weeks    DISCHARGE MEDICATION:     Medication List      CHANGE how you take these medications    atorvastatin 10 MG tablet  Commonly known as:  LIPITOR  Take 2 tablets by mouth daily  What changed:  how much to take     insulin glargine 100 UNIT/ML injection vial  Commonly known as:  LANTUS  Inject 43 Units into the skin nightly  What changed:  how much to take        CONTINUE taking these medications    albuterol sulfate  (90 Base) MCG/ACT inhaler     aspirin 81 MG chewable tablet     cetirizine 10 MG tablet  Commonly known as:  ZYRTEC     docusate sodium 100 MG capsule  Commonly known as:  COLACE     DULERA IN     EpiPen 0.3 MG/0.3ML Soaj injection  Generic drug:  EPINEPHrine     fluticasone 50 MCG/ACT nasal spray  Commonly known as:  FLONASE     hydroCHLOROthiazide 25 MG tablet  Commonly known as:  HYDRODIURIL     insulin lispro 100 UNIT/ML pen  Commonly known as:  HumaLOG KwikPen  Inject 8 Units into the skin 3 times daily (before meals) Please dispense appropriate amount of pen needles     losartan 100 MG tablet  Commonly known as:  COZAAR     meclizine 25 MG tablet  Commonly known as:  ANTIVERT     metFORMIN 500 MG tablet  Commonly known as:  GLUCOPHAGE  Take 2 tablets by mouth 2 times daily (with meals).      omeprazole 20 MG delayed release capsule  Commonly known as:  PRILOSEC     polyethylene glycol 17 g packet  Commonly known as:  GLYCOLAX     pregabalin 50 MG capsule  Commonly known as:  LYRICA     sodium chloride 0.65 % nasal spray  Commonly known as:  OCEAN     spironolactone 50 MG tablet  Commonly known as:  ALDACTONE     sucralfate 1 GM tablet  Commonly known as:  Carafate  Take 1 tablet by mouth 4 times daily     traZODone 50 MG tablet  Commonly known as:  DESYREL        STOP taking these medications    beclomethasone 80 MCG/ACT inhaler  Commonly known as:  QVAR     DULoxetine 60 MG extended release capsule  Commonly known as:  CYMBALTA     latanoprost 0.005 % ophthalmic solution  Commonly known as:  XALATAN     olopatadine 0.2 % Soln ophthalmic solution  Commonly known as:  PATADAY     Singulair 10 MG tablet  Generic drug:  montelukast     Symbicort 160-4.5 MCG/ACT Aero  Generic drug:  budesonide-formoterol     tiotropium 18 MCG inhalation capsule  Commonly known as:  SPIRIVA     tropicamide 1 % ophthalmic solution  Commonly known as:  MYDRIACYL           Where to Get Your Medications      These medications were sent to [place], [address]  [facility]. Loren Lorenzo [phone] - F [phone]  [address] [facility] [address]    Phone:  [phone]   · atorvastatin 10 MG tablet       Activity: activity as tolerated  Diet: cardiac diet  Wound Care: as directed    Time Spent on discharge is more than 20 minutes    Signed:  Kian Alva DO  Internal Medicine, PGY-2  7/13/2020

## 2020-07-09 NOTE — PLAN OF CARE
Problem: Respiratory:  Goal: Ability to maintain a clear airway will improve  Description: Ability to maintain a clear airway will improve  Outcome: Ongoing     Pt able to maintain respiration between 16-18 beats per minute, oxygen saturation greater then 90% without supplemental oxygen. Pt sleeping comfortably in bed. Will continue to monitor. Problem: Pain:  Description: Pain management should include both nonpharmacologic and pharmacologic interventions. Goal: Pain level will decrease  Description: Pain level will decrease  Outcome: Ongoing   Pt' s pain controlled with medication during this shift. See MAR. Vital signs stable. Pt resting comfortably in bed. Will continue to monitor. Problem: Falls - Risk of:  Goal: Will remain free from falls  Description: Will remain free from falls  Outcome: Ongoing   Pt remains without falls during this shift. Pt does not attempt to get OOB alone. Pt able to call out appropriately, call light within reach. Safety precautions in place include fall armband, fall towel, chair & bed alarms, non slip foot wear. Signs posted on door, and door remains open for observation. The bed is in lowest position, wheels are locked, and rails are up 2/4. Will continue with current care.

## 2020-07-09 NOTE — PROGRESS NOTES
Pt was admitted to [facility] from [place] [facility]. Pt was oriented to the room and how to use the call light. Admission questions completed wt the . Vital signs stable, assessment WNLPt denies chest pains and SOB. COVID Swap per [place] ED. She is Sinus rhythm on tele. Assessments as charted , will continue to monitor.

## 2020-07-09 NOTE — ED NOTES
Report called to [facility] RN for form 0943 and informed of janes olivo.       Darius Elder RN  07/08/20 2021

## 2020-07-09 NOTE — H&P
Yes Historical Provider, MD   aspirin 81 MG chewable tablet Take 81 mg by mouth daily   Yes Historical Provider, MD   pregabalin (LYRICA) 50 MG capsule Take 50 mg by mouth 2 times daily. .   Yes Historical Provider, MD   atorvastatin (LIPITOR) 10 MG tablet Take 10 mg by mouth daily   Yes Historical Provider, MD   sucralfate (CARAFATE) 1 GM tablet Take 1 tablet by mouth 4 times daily 7/6/18  Yes Carry Gunner, DO   insulin glargine (LANTUS) 100 UNIT/ML injection vial Inject 43 Units into the skin nightly  Patient taking differently: Inject 55 Units into the skin nightly  2/6/16  Yes Cherie Ferrara MD   insulin lispro (HUMALOG KWIKPEN) 100 UNIT/ML pen Inject 8 Units into the skin 3 times daily (before meals) Please dispense appropriate amount of pen needles 2/6/16  Yes Cherie Ferrara MD   metFORMIN (GLUCOPHAGE) 500 MG tablet Take 2 tablets by mouth 2 times daily (with meals). 6/3/14  Yes Cricket Delgadillo MD   albuterol (PROVENTIL HFA;VENTOLIN HFA) 108 (90 BASE) MCG/ACT inhaler Inhale 2 puffs into the lungs every 6 hours as needed for Wheezing. Yes Historical Provider, MD   DULoxetine (CYMBALTA) 60 MG capsule Take 60 mg by mouth 2 times daily. Yes Historical Provider, MD   hydrochlorothiazide (HYDRODIURIL) 25 MG tablet Take 25 mg by mouth daily. Yes Historical Provider, MD   losartan (COZAAR) 100 MG tablet Take 100 mg by mouth daily. Yes Historical Provider, MD   meclizine (ANTIVERT) 25 MG tablet Take 25 mg by mouth 3 times daily as needed. Yes Historical Provider, MD   montelukast (SINGULAIR) 10 MG tablet Take 10 mg by mouth nightly. Yes Historical Provider, MD   tiotropium (SPIRIVA) 18 MCG inhalation capsule Inhale 18 mcg into the lungs daily. Yes Historical Provider, MD   spironolactone (ALDACTONE) 50 MG tablet Take 50 mg by mouth 2 times daily. Yes Historical Provider, MD   traZODone (DESYREL) 50 MG tablet Take 50 mg by mouth nightly.    Yes Historical Provider, MD   cetirizine (ZYRTEC) 10 MG tablet Take 10 mg by mouth daily    Historical Provider, MD   beclomethasone (QVAR) 80 MCG/ACT inhaler Inhale 2 puffs into the lungs 2 times daily. Historical Provider, MD   docusate sodium (COLACE) 100 MG capsule Take 100 mg by mouth three times daily. Historical Provider, MD   EPINEPHrine (EPIPEN) 0.3 MG/0.3ML SOAJ injection Inject 0.3 mg into the muscle as needed. Use as directed for allergic reaction    Historical Provider, MD   fluticasone (FLONASE) 50 MCG/ACT nasal spray 2 sprays by Nasal route daily. Historical Provider, MD   latanoprost (XALATAN) 0.005 % ophthalmic solution Place 1 drop into both eyes nightly. Historical Provider, MD   Olopatadine HCl 0.2 % SOLN Apply 1 drop to eye daily. Historical Provider, MD   omeprazole (PRILOSEC) 20 MG capsule Take 40 mg by mouth every morning (before breakfast). Historical Provider, MD   polyethylene glycol (GLYCOLAX) packet Take 17 g by mouth 3 times daily. Historical Provider, MD   sodium chloride (OCEAN) 0.65 % nasal spray 2 sprays by Nasal route as needed for Congestion. Historical Provider, MD   tropicamide (MYDRIACYL) 1 % ophthalmic solution Place 1 drop into the right eye 3 times daily. Historical Provider, MD   budesonide-formoterol (SYMBICORT) 160-4.5 MCG/ACT AERO Inhale 2 puffs into the lungs 2 times daily. Historical Provider, MD       Allergies:  Latex; Doxycycline; Flagyl [metronidazole]; Ibuprofen; and Iv dye [iodides]    Social History:    TOBACCO:   reports that she has never smoked. She has never used smokeless tobacco.  ETOH:   reports current alcohol use. E-Cigarettes Vaping or Juuling     Questions Responses    Vaping Use     Start Date     Does device contain nicotine? Quit Date     Vaping Type         Family History:    Reviewed in detail and negative for DM, CAD, Cancer, CVA.  Positive as follows:        Problem Relation Age of Onset    Heart Disease Mother     Heart Disease Father        REVIEW OF SYSTEMS: Pertinent positives as noted in the HPI. All other systems reviewed and negative. PHYSICAL EXAM PERFORMED:    BP (!) 141/74   Pulse 83   Temp 98.5 °F (36.9 °C) (Oral)   Resp 16   Ht 5' 2\" (1.575 m)   Wt 86 lb 12.8 oz (39.4 kg)   SpO2 98%   BMI 15.88 kg/m²     General appearance:  No apparent distress, appears stated age and cooperative. Obese  HEENT:  Normal cephalic, atraumatic without obvious deformity. Pupils equal, round, and reactive to light. Extra ocular muscles intact. Conjunctivae/corneas clear. Neck: Supple, with full range of motion. No jugular venous distention. Trachea midline. Respiratory:  Normal respiratory effort. Clear to auscultation, bilaterally without Rales/Wheezes/Rhonchi. Cardiovascular:  Regular rate and rhythm with normal S1/S2 without murmurs, rubs or gallops. Abdomen: Soft, non-tender, non-distended with normal bowel sounds. Musculoskeletal:  No clubbing, cyanosis or edema bilaterally. Full range of motion without deformity. Skin: Skin color, texture, turgor normal.  No rashes or lesions. Neurologic:  Neurovascularly intact without any focal sensory/motor deficits.  Cranial nerves: II-XII intact, grossly non-focal.  Psychiatric:  Alert and oriented, thought content appropriate, normal insight  Capillary Refill: Brisk,< 3 seconds   Peripheral Pulses: +2 palpable, equal bilaterally       Labs:     Recent Labs     07/08/20 1819   WBC 6.8   HGB 11.7*   HCT 35.2*        Recent Labs     07/08/20  1819      K 4.2      CO2 25   BUN 12   CREATININE 0.6   CALCIUM 9.7     Recent Labs     07/08/20  1819   AST 20   ALT 16   BILITOT 0.8   ALKPHOS 84     Recent Labs     07/08/20  1819   INR 1.05     Recent Labs     07/08/20  1819   TROPONINI <0.01       Urinalysis:      Lab Results   Component Value Date    NITRU Negative 07/06/2018    WBCUA 6-10 07/06/2018    BACTERIA 3+ 07/06/2018    RBCUA 0-2 07/06/2018    BLOODU Negative 07/06/2018    SPECGRAV 1.025 07/06/2018 GLUCOSEU >=1000 07/06/2018       Radiology:   EKG:  I have reviewed the EKG with the following interpretation: Sinus rhythm, VR = 75, normal intervals, no acute ST-T changes    CT HEAD WO CONTRAST   Final Result   1. Normal noncontrast CT scan of the head. XR CHEST STANDARD (2 VW)   Final Result   1. No evidence of acute cardiopulmonary disease. NM Cardiac Stress Test Nuclear Imaging    (Results Pending)       ASSESSMENT:    Active Hospital Problems    Diagnosis Date Noted    Chest pain [R07.9] 07/08/2020   HEART score is 6, no recent cardiac work-up done  Essential hypertension with elevated blood pressure  Hyperlipidemia  DM-2 with peripheral neuropathy  History of bronchial asthma without exacerbation  Cervical spine DDD  Fibromyalgia  Bipolar disorder/depression    PLAN:  Trend cardiac enzymes  Continue aspirin and statins  Pain relief as ordered  Repeat EKG in a.m. and as needed with chest pain  N.p.o. from midnight (according to the RN stress testing will not take the patient until the COVID testing comes back negative. We will keep patient n.p.o. and if she does not undergo stress testing in a.m. will start on ADA diet)  Nuclear medicine stress test in a.m. Continue the decreased dose of Lantus and add low-dose SSI  Hold metformin  Check A1c and lipid panel in a.m. Blood pressure control, will continue her home doses of antihypertensives  Would consider outpatient work-up for cervical spine DDD if cardiac work-up is negative  Monitor electrolytes and renal function  Supportive therapy    COVID-19 PCR sent from the ER prior to transfer, results pending. DVT Prophylaxis: Lovenox  Diet: DIET CARB CONTROL; No Caffeine  Diet NPO, After Midnight  Code Status: Full Code    PT/OT Eval Status: As tolerated    Dispo -GMF with telemetry       Elizabeth Webb MD    Thank you [phone] for the opportunity to be involved in this patient's care.  If you have any questions or concerns please feel free to contact me at [phone].

## 2020-07-09 NOTE — CARE COORDINATION
Case Management Assessment           Initial Evaluation                Date / Time of Evaluation: 7/9/2020 2:25 PM                 Assessment Completed by: Maryse Dominguez    Patient Name: Bonifacio Reeder     YOB: 1965  Diagnosis: Chest pain [R07.9]  Chest pain [R07.9]     Date / Time: 7/8/2020  5:25 PM    Patient Admission Status: Observation    If patient is discharged prior to next notation, then this note serves as note for discharge by case management. Current PCP: Wisam Esposito Patient: No    Chart Reviewed: Yes  Patient/ Family Interviewed: Yes    Initial assessment completed at bedside with: with pt over the phone due to isolation    Hospitalization in the last 30 days: No    Emergency Contacts:  Extended Emergency Contact Information  Primary Emergency Contact: Waleska Sanders  Address: [address] Phone: [phone]  Relation: Child  Secondary Emergency Contact: Chiquita Santos, [address] [place] [address] Phone: [phone]  Relation: Other    Advance Directives:   Code Status: Full Code    Healthcare Power of : No      Financial  Payor: [employer] / Plan: Andrea Alegre / Product Type: *No Product type* /     Pre-cert required for SNF: Yes    Pharmacy  No Pharmacies Listed    Potential assistance Purchasing Medications:    Does Patient want to participate in local refill/ meds to beds program?:      Meds To Beds General Rules:  1. Can ONLY be done Monday- Friday between 8:30am-5pm  2. Prescription(s) must be in pharmacy by 3pm to be filled same day  3. Copy of patient's insurance/ prescription drug card and patient face sheet must be sent along with the prescription(s)  4. Cost of Rx cannot be added to hospital bill. If financial assistance is needed, please contact unit  or ;   or  CANNOT provide pharmacy voucher for patients co-pays  5. Patients can then  the prescription on their way out of the hospital at discharge, or pharmacy can deliver to the bedside if staff is available. (payment due at time of pick-up or delivery - cash, check, or card accepted)     Able to afford home medications/ co-pay costs: Yes    ADLS  Support Systems:      PT AM-PAC:   /24  OT AM-PAC:   /24    [place]: from home with spouse  Steps:     Plans to RETURN to current housing: Yes  Barriers to RETURNING to current housing: none    Home Care Information  Currently ACTIVE with [address]: No  Home Care Agency: Not Applicable          Durable Medical Equipment  DME Provider: n/a  Equipment: n/a    Home Oxygen and Respiratory Equipment  Has [facility] prior to admission: No  [address]: Not Applicable      DISCHARGE PLAN:  Disposition: Home- No Services Needed    Transportation PLAN for discharge: family     Factors facilitating achievement of predicted outcomes: Family support    Barriers to discharge: Medical complications    Additional Case Management Notes:   CM spoke with patient over the phone due to isolation. Patient is from home with spouse, independent pta. No CM needs at this time. Spouse to transport home at discharge. The Plan for Transition of Care is related to the following treatment goals of Chest pain [R07.9]  Chest pain [R07.9]    The Patient and/or patient representative Dutch Christina and her family were provided with a choice of provider and agrees with the discharge plan Yes    Freedom of choice list was provided with basic dialogue that supports the patient's individualized plan of care/goals and shares the quality data associated with the providers.  Yes    Care Transition patient: No    Denver Mccoy RN  [facility]  Case Management Department  Ph: [phone]   Fax: [phone]

## 2020-07-10 LAB
GLUCOSE BLD-MCNC: 215 MG/DL (ref 70–99)
GLUCOSE BLD-MCNC: 225 MG/DL (ref 70–99)
GLUCOSE BLD-MCNC: 256 MG/DL (ref 70–99)
GLUCOSE BLD-MCNC: 267 MG/DL (ref 70–99)
PERFORMED ON: ABNORMAL

## 2020-07-10 PROCEDURE — 2580000003 HC RX 258: Performed by: INTERNAL MEDICINE

## 2020-07-10 PROCEDURE — 96372 THER/PROPH/DIAG INJ SC/IM: CPT

## 2020-07-10 PROCEDURE — U0003 INFECTIOUS AGENT DETECTION BY NUCLEIC ACID (DNA OR RNA); SEVERE ACUTE RESPIRATORY SYNDROME CORONAVIRUS 2 (SARS-COV-2) (CORONAVIRUS DISEASE [COVID-19]), AMPLIFIED PROBE TECHNIQUE, MAKING USE OF HIGH THROUGHPUT TECHNOLOGIES AS DESCRIBED BY CMS-2020-01-R: HCPCS

## 2020-07-10 PROCEDURE — 6370000000 HC RX 637 (ALT 250 FOR IP): Performed by: STUDENT IN AN ORGANIZED HEALTH CARE EDUCATION/TRAINING PROGRAM

## 2020-07-10 PROCEDURE — G0378 HOSPITAL OBSERVATION PER HR: HCPCS

## 2020-07-10 PROCEDURE — 6360000002 HC RX W HCPCS: Performed by: INTERNAL MEDICINE

## 2020-07-10 PROCEDURE — 6370000000 HC RX 637 (ALT 250 FOR IP): Performed by: INTERNAL MEDICINE

## 2020-07-10 PROCEDURE — 99451 NTRPROF PH1/NTRNET/EHR 5/>: CPT | Performed by: INTERNAL MEDICINE

## 2020-07-10 RX ORDER — INSULIN LISPRO 100 [IU]/ML
0-6 INJECTION, SOLUTION INTRAVENOUS; SUBCUTANEOUS NIGHTLY
Status: DISCONTINUED | OUTPATIENT
Start: 2020-07-10 | End: 2020-07-11

## 2020-07-10 RX ORDER — ATORVASTATIN CALCIUM 20 MG/1
20 TABLET, FILM COATED ORAL NIGHTLY
Status: DISCONTINUED | OUTPATIENT
Start: 2020-07-10 | End: 2020-07-13 | Stop reason: HOSPADM

## 2020-07-10 RX ORDER — CALCIUM CARBONATE 200(500)MG
500 TABLET,CHEWABLE ORAL 3 TIMES DAILY PRN
Status: DISCONTINUED | OUTPATIENT
Start: 2020-07-10 | End: 2020-07-13 | Stop reason: HOSPADM

## 2020-07-10 RX ORDER — INSULIN LISPRO 100 [IU]/ML
0-12 INJECTION, SOLUTION INTRAVENOUS; SUBCUTANEOUS
Status: DISCONTINUED | OUTPATIENT
Start: 2020-07-10 | End: 2020-07-11

## 2020-07-10 RX ADMIN — INSULIN GLARGINE 30 UNITS: 100 INJECTION, SOLUTION SUBCUTANEOUS at 21:10

## 2020-07-10 RX ADMIN — INSULIN LISPRO 2 UNITS: 100 INJECTION, SOLUTION INTRAVENOUS; SUBCUTANEOUS at 13:21

## 2020-07-10 RX ADMIN — INSULIN LISPRO 2 UNITS: 100 INJECTION, SOLUTION INTRAVENOUS; SUBCUTANEOUS at 09:22

## 2020-07-10 RX ADMIN — PREGABALIN 50 MG: 50 CAPSULE ORAL at 09:22

## 2020-07-10 RX ADMIN — DOCUSATE SODIUM 100 MG: 100 CAPSULE, LIQUID FILLED ORAL at 21:06

## 2020-07-10 RX ADMIN — TRAZODONE HYDROCHLORIDE 50 MG: 50 TABLET ORAL at 21:06

## 2020-07-10 RX ADMIN — ATORVASTATIN CALCIUM 20 MG: 20 TABLET, FILM COATED ORAL at 21:06

## 2020-07-10 RX ADMIN — Medication 10 ML: at 21:06

## 2020-07-10 RX ADMIN — ASPIRIN 81 MG: 81 TABLET, CHEWABLE ORAL at 09:21

## 2020-07-10 RX ADMIN — LOSARTAN POTASSIUM 100 MG: 100 TABLET, FILM COATED ORAL at 09:21

## 2020-07-10 RX ADMIN — Medication 10 ML: at 09:21

## 2020-07-10 RX ADMIN — PANTOPRAZOLE SODIUM 40 MG: 40 TABLET, DELAYED RELEASE ORAL at 06:06

## 2020-07-10 RX ADMIN — CETIRIZINE HYDROCHLORIDE 10 MG: 10 TABLET, FILM COATED ORAL at 09:21

## 2020-07-10 RX ADMIN — ANTACID TABLETS 500 MG: 500 TABLET, CHEWABLE ORAL at 21:53

## 2020-07-10 RX ADMIN — PREGABALIN 50 MG: 50 CAPSULE ORAL at 21:06

## 2020-07-10 RX ADMIN — INSULIN LISPRO 3 UNITS: 100 INJECTION, SOLUTION INTRAVENOUS; SUBCUTANEOUS at 21:10

## 2020-07-10 RX ADMIN — DOCUSATE SODIUM 100 MG: 100 CAPSULE, LIQUID FILLED ORAL at 08:02

## 2020-07-10 RX ADMIN — ENOXAPARIN SODIUM 40 MG: 40 INJECTION SUBCUTANEOUS at 09:21

## 2020-07-10 RX ADMIN — INSULIN LISPRO 6 UNITS: 100 INJECTION, SOLUTION INTRAVENOUS; SUBCUTANEOUS at 18:16

## 2020-07-10 RX ADMIN — HYDROCHLOROTHIAZIDE 25 MG: 25 TABLET ORAL at 09:21

## 2020-07-10 RX ADMIN — DOCUSATE SODIUM 100 MG: 100 CAPSULE, LIQUID FILLED ORAL at 15:39

## 2020-07-10 ASSESSMENT — VISUAL ACUITY: OU: 1

## 2020-07-10 ASSESSMENT — PAIN SCALES - GENERAL
PAINLEVEL_OUTOF10: 0

## 2020-07-10 ASSESSMENT — ENCOUNTER SYMPTOMS
BACK PAIN: 0
COLOR CHANGE: 0
SINUS PAIN: 0
EYE PAIN: 0
ABDOMINAL PAIN: 0
CONSTIPATION: 1
DIARRHEA: 0
SHORTNESS OF BREATH: 0
COUGH: 0

## 2020-07-10 NOTE — PROGRESS NOTES
Lab called RN to report a positive COVID result from the . RN sent text message to MD and will call . When trying to confirm test result, found to be another patient. Test for this patient is still pending results.

## 2020-07-10 NOTE — PLAN OF CARE
Problem: Pain:  Description: Pain management should include both nonpharmacologic and pharmacologic interventions. Goal: Pain level will decrease  Description: Pain level will decrease  Outcome: Ongoing   Pt denies chest pain throughout shift. Vital signs stable. Pt resting comfortably in bed continue to monitor. Problem: Respiratory:  Goal: Ability to maintain a clear airway will improve  Description: Ability to maintain a clear airway will improve  Outcome: Ongoing   Pt able to maintain respiration between 16-18 beats per minute, oxygen saturation greater then 90% without supplemental oxygen. Pt sleeping comfortably in bed . Will continue to monitor. Problem: Falls - Risk of:  Goal: Will remain free from falls  Description: Will remain free from falls  7/9/2020 2124 by Kayla Kingston RN  Outcome: Ongoing  Pt remains without falls during this shift. Pt does not attempt to get OOB alone. Pt able to call out appropriately, call light within reach. Safety precautions in place include fall armband, fall towel, chair & bed alarms, non slip foot wear. Signs posted on door, and door remains open for observation. The bed is in lowest position, wheels are locked, and rails are up 2/4. Will continue with current care.

## 2020-07-10 NOTE — CONSULTS
[address]         Reason for Consultation/Chief Complaint: \"I have been having chest pain. \"       History of Present Illness:  Misha Dennis is a 60 y.o. patient who presented to the hospital with complaints of sharp chest pain at rest.  The patient has left arm and hand numbness and weakness of left hand. Pain radiates from chest to left shoulder. She has a history of \" mini CVA\" or possibly migranes. CP Not worse with exertion necessarily. Patient describes pain as sharp and sometimes as dull achy. Pain is not associated with shortness of breath, palpitations, nausea, vomiting, diaphoresis. When pain occurs it lasts for several hours each time. No identifiable aggravating or relieving factors. Patient denies any fever, chills, cough, shortness of breath, URI symptoms, diarrhea or urinary symptoms. Information obtained through the EMR. Patient is now COVID rule out at the time of this note. Past Medical History:   has a past medical history of Asthma, Bipolar 1 disorder ([facility] [address] Chronic back pain, DDD (degenerative disc disease), Depression, Diabetes mellitus ([facility] Fibromyalgia, Hyperlipidemia, and Hypertension. Surgical History:   has a past surgical history that includes Hysterectomy; Carpal tunnel release; and Hand surgery. Social History:   reports that she has never smoked. She has never used smokeless tobacco. She reports current alcohol use. She reports that she does not use drugs. Family History:  No evidence for sudden cardiac death or premature CAD    Home Medications:  Were reviewed and are listed in nursing record. and/or listed below  Prior to Admission medications    Medication Sig Start Date End Date Taking?  Authorizing Provider   Mometasone Furo-Formoterol Fum (DULERA IN) Inhale into the lungs   Yes Historical Provider, MD   aspirin 81 MG chewable tablet Take 81 mg by mouth daily   Yes Historical Provider, MD   pregabalin (LYRICA) 50 MG capsule Take 50 mg by mouth 2 times daily. .   Yes Historical Provider, MD   atorvastatin (LIPITOR) 10 MG tablet Take 10 mg by mouth daily   Yes Historical Provider, MD   cetirizine (ZYRTEC) 10 MG tablet Take 10 mg by mouth daily   Yes Historical Provider, MD   sucralfate (CARAFATE) 1 GM tablet Take 1 tablet by mouth 4 times daily 7/6/18  Yes Nighat Mcnamara DO   insulin glargine (LANTUS) 100 UNIT/ML injection vial Inject 43 Units into the skin nightly  Patient taking differently: Inject 55 Units into the skin nightly  2/6/16  Yes Ej Lynn MD   insulin lispro (HUMALOG KWIKPEN) 100 UNIT/ML pen Inject 8 Units into the skin 3 times daily (before meals) Please dispense appropriate amount of pen needles 2/6/16  Yes Ej Lynn MD   metFORMIN (GLUCOPHAGE) 500 MG tablet Take 2 tablets by mouth 2 times daily (with meals). 6/3/14  Yes Mili Saldaña MD   albuterol (PROVENTIL HFA;VENTOLIN HFA) 108 (90 BASE) MCG/ACT inhaler Inhale 2 puffs into the lungs every 6 hours as needed for Wheezing. Yes Historical Provider, MD   beclomethasone (QVAR) 80 MCG/ACT inhaler Inhale 2 puffs into the lungs 2 times daily. Yes Historical Provider, MD   docusate sodium (COLACE) 100 MG capsule Take 100 mg by mouth three times daily. Yes Historical Provider, MD   DULoxetine (CYMBALTA) 60 MG capsule Take 60 mg by mouth 2 times daily. Yes Historical Provider, MD   fluticasone (FLONASE) 50 MCG/ACT nasal spray 2 sprays by Nasal route daily. Yes Historical Provider, MD   hydrochlorothiazide (HYDRODIURIL) 25 MG tablet Take 25 mg by mouth daily. Yes Historical Provider, MD   losartan (COZAAR) 100 MG tablet Take 100 mg by mouth daily. Yes Historical Provider, MD   meclizine (ANTIVERT) 25 MG tablet Take 25 mg by mouth 3 times daily as needed. Yes Historical Provider, MD   montelukast (SINGULAIR) 10 MG tablet Take 10 mg by mouth nightly.    Yes Historical Provider, MD   omeprazole (PRILOSEC) 20 MG capsule Take 40 mg by mouth every glucagon (rDNA), dextrose, regadenoson, nitroGLYCERIN   dextrose         Allergies:  Latex; Doxycycline; Flagyl [metronidazole]; Ibuprofen; and Iv dye [iodides]     Due to the current efforts to prevent transmission of COVID-19 and also the need to preserve PPE for other caregivers, a face-to-face encounter with the patient was not performed. That being said, all relevant records and diagnostic tests were reviewed, including laboratory results and imaging. Please reference any relevant documentation elsewhere. Care will be coordinated with the primary service. Physical Examination:    Vitals:    07/10/20 1313   BP: (!) 143/88   Pulse: 69   Resp: 16   Temp: 98.8 °F (37.1 °C)   SpO2: 99%    Weight: 194 lb 0.1 oz (88 kg)           Labs  CBC:   Lab Results   Component Value Date    WBC 6.8 07/08/2020    RBC 3.81 07/08/2020    HGB 11.7 07/08/2020    HCT 35.2 07/08/2020    MCV 92.2 07/08/2020    RDW 12.6 07/08/2020     07/08/2020     CMP:    Lab Results   Component Value Date     07/09/2020    K 3.6 07/09/2020     07/09/2020    CO2 24 07/09/2020    BUN 11 07/09/2020    CREATININE 0.7 07/09/2020    GFRAA >60 07/09/2020    AGRATIO 1.7 07/08/2020    LABGLOM >60 07/09/2020    GLUCOSE 119 07/09/2020    PROT 7.3 07/08/2020    CALCIUM 9.3 07/09/2020    BILITOT 0.8 07/08/2020    ALKPHOS 84 07/08/2020    AST 20 07/08/2020    ALT 16 07/08/2020     PT/INR:  No results found for: PTINR  Recent Labs     07/08/20  1819 07/09/20  0438   TROPONINI <0.01 <0.01       EKG: SR with no acute ST changes on 7/8. Assessment  Patient Active Problem List   Diagnosis    Syncope and collapse    Community acquired bacterial pneumonia    Chest pain        Impression:  Atypical chest pain. Diabetes. HTN. Recommendations:    I had the opportunity to review the clinical symptoms and presentation of Nara Ellison via the EMR.      I recommend that the patient undergo further cardiac evaluation with Lito Quinn when patient is covid negative with at least 2 consecutive tests. I recommend that the patient be treated with nitrodur patch 0.2 mg/hr and atorvastatin 20 mg daily with asa 81 mg daily. The note was completed using EMR. Every effort was made to ensure accuracy; however, inadvertent computerized transcription errors may be present.        Nani Cross MD [facility] [place]

## 2020-07-10 NOTE — PROGRESS NOTES
Progress Note    Admit Date: 7/8/2020  Day: 3  Diet: DIET CARB CONTROL; Carb Control: 4 carb choices (60 gms)/meal; No Caffeine  Diet NPO, After Midnight    CC: Chest pain    Interval history:   No overnight events since admission. Vitals stable and WNL. This AM patient is not having chest pain. She denies SOB, abdominal pain, nausea, vomiting and diarrhea. Awaiting covid test result. Medications:     Scheduled Meds:   atorvastatin  20 mg Oral Nightly    insulin glargine  30 Units Subcutaneous Nightly    aspirin  81 mg Oral Daily    docusate sodium  100 mg Oral TID    cetirizine  10 mg Oral Daily    hydroCHLOROthiazide  25 mg Oral Daily    losartan  100 mg Oral Daily    pantoprazole  40 mg Oral QAM AC    pregabalin  50 mg Oral BID    traZODone  50 mg Oral Nightly    sodium chloride flush  10 mL Intravenous 2 times per day    insulin lispro  0-6 Units Subcutaneous TID WC    insulin lispro  0-3 Units Subcutaneous Nightly    enoxaparin  40 mg Subcutaneous Daily     Continuous Infusions:   dextrose       PRN Meds:sodium chloride flush, acetaminophen **OR** acetaminophen, polyethylene glycol, promethazine **OR** ondansetron, glucose, dextrose, glucagon (rDNA), dextrose, regadenoson, nitroGLYCERIN    Objective:   Vitals:   T-max:  Patient Vitals for the past 8 hrs:   BP Temp Temp src Pulse Resp SpO2 Weight   07/10/20 1313 (!) 143/88 98.8 °F (37.1 °C) Oral 69 16 99 % --   07/10/20 0754 (!) 146/75 98.4 °F (36.9 °C) Oral 61 16 98 % 194 lb 0.1 oz (88 kg)       Intake/Output Summary (Last 24 hours) at 7/10/2020 1408  Last data filed at 7/9/2020 2351  Gross per 24 hour   Intake 340 ml   Output 200 ml   Net 140 ml     Review of Systems   Constitutional: Negative for fatigue and fever. HENT: Negative for ear pain and sinus pain. Eyes: Negative for pain. Respiratory: Negative for cough and shortness of breath. Cardiovascular: Positive for chest pain.         Chest pain appears to be partially reproduced with left arm movement. Gastrointestinal: Positive for constipation. Negative for abdominal pain and diarrhea. Endocrine: Negative for polyuria. Genitourinary: Negative for flank pain and pelvic pain. Musculoskeletal: Negative for back pain. Skin: Negative for color change. Neurological: Positive for numbness. Negative for dizziness and headaches. Psychiatric/Behavioral: Negative for agitation, behavioral problems and confusion. Physical Exam  Constitutional:       General: She is awake. She is not in acute distress. Appearance: Normal appearance. HENT:      Head: Normocephalic and atraumatic. Nose: Nose normal.   Eyes:      General: Vision grossly intact. Gaze aligned appropriately. Right eye: No discharge. Left eye: No discharge. Extraocular Movements: Extraocular movements intact. Conjunctiva/sclera: Conjunctivae normal.   Neck:      Musculoskeletal: Full passive range of motion without pain, normal range of motion and neck supple. Cardiovascular:      Rate and Rhythm: Normal rate and regular rhythm. Pulses: Normal pulses. Heart sounds: Normal heart sounds. Pulmonary:      Effort: Pulmonary effort is normal.      Breath sounds: Normal breath sounds. Abdominal:      General: There is no distension. There are no signs of injury. Palpations: Abdomen is soft. Tenderness: There is no abdominal tenderness. Musculoskeletal: Normal range of motion. General: Swelling present. No deformity or signs of injury. Skin:     General: Skin is warm. Neurological:      General: No focal deficit present. Mental Status: She is alert, oriented to person, place, and time and easily aroused. Mental status is at baseline. Motor: Motor function is intact. Coordination: Coordination is intact. Gait: Gait is intact.    Psychiatric:         Attention and Perception: Attention and perception normal.         Mood and Affect: Mood

## 2020-07-10 NOTE — PLAN OF CARE
Problem: Respiratory:  Goal: Complications related to the disease process, condition or treatment will be avoided or minimized  Description: Complications related to the disease process, condition or treatment will be avoided or minimized  Outcome: Ongoing   O2 stats remain in high 90s on room air. Pt just waiting for covid results to have stress test.  Problem: Falls - Risk of:  Goal: Will remain free from falls  Description: Will remain free from falls  Outcome: Ongoing   Patient is up ad david, very steady, no issues.

## 2020-07-11 PROBLEM — R07.9 CHEST PAIN: Status: RESOLVED | Noted: 2020-07-08 | Resolved: 2020-07-11

## 2020-07-11 LAB
GLUCOSE BLD-MCNC: 214 MG/DL (ref 70–99)
GLUCOSE BLD-MCNC: 218 MG/DL (ref 70–99)
GLUCOSE BLD-MCNC: 235 MG/DL (ref 70–99)
GLUCOSE BLD-MCNC: 257 MG/DL (ref 70–99)
GLUCOSE BLD-MCNC: 263 MG/DL (ref 70–99)
GLUCOSE BLD-MCNC: 380 MG/DL (ref 70–99)
LV EF: 73 %
LVEF MODALITY: NORMAL
PERFORMED ON: ABNORMAL
REPORT: NORMAL
SARS-COV-2: NOT DETECTED
THIS TEST SENT TO: NORMAL

## 2020-07-11 PROCEDURE — 6370000000 HC RX 637 (ALT 250 FOR IP): Performed by: STUDENT IN AN ORGANIZED HEALTH CARE EDUCATION/TRAINING PROGRAM

## 2020-07-11 PROCEDURE — 6370000000 HC RX 637 (ALT 250 FOR IP): Performed by: INTERNAL MEDICINE

## 2020-07-11 PROCEDURE — 78452 HT MUSCLE IMAGE SPECT MULT: CPT

## 2020-07-11 PROCEDURE — 2580000003 HC RX 258: Performed by: INTERNAL MEDICINE

## 2020-07-11 PROCEDURE — A9502 TC99M TETROFOSMIN: HCPCS | Performed by: INTERNAL MEDICINE

## 2020-07-11 PROCEDURE — 96372 THER/PROPH/DIAG INJ SC/IM: CPT

## 2020-07-11 PROCEDURE — 6360000002 HC RX W HCPCS: Performed by: INTERNAL MEDICINE

## 2020-07-11 PROCEDURE — 93017 CV STRESS TEST TRACING ONLY: CPT

## 2020-07-11 PROCEDURE — 3430000000 HC RX DIAGNOSTIC RADIOPHARMACEUTICAL: Performed by: INTERNAL MEDICINE

## 2020-07-11 PROCEDURE — G0378 HOSPITAL OBSERVATION PER HR: HCPCS

## 2020-07-11 PROCEDURE — 99243 OFF/OP CNSLTJ NEW/EST LOW 30: CPT | Performed by: INTERNAL MEDICINE

## 2020-07-11 RX ORDER — PREDNISONE 20 MG/1
40 TABLET ORAL DAILY
Status: DISCONTINUED | OUTPATIENT
Start: 2020-07-11 | End: 2020-07-13 | Stop reason: HOSPADM

## 2020-07-11 RX ORDER — DEXTROSE MONOHYDRATE 50 MG/ML
100 INJECTION, SOLUTION INTRAVENOUS PRN
Status: DISCONTINUED | OUTPATIENT
Start: 2020-07-11 | End: 2020-07-11 | Stop reason: SDUPTHER

## 2020-07-11 RX ORDER — INSULIN LISPRO 100 [IU]/ML
0-18 INJECTION, SOLUTION INTRAVENOUS; SUBCUTANEOUS
Status: DISCONTINUED | OUTPATIENT
Start: 2020-07-11 | End: 2020-07-13 | Stop reason: HOSPADM

## 2020-07-11 RX ORDER — INSULIN LISPRO 100 [IU]/ML
0-9 INJECTION, SOLUTION INTRAVENOUS; SUBCUTANEOUS NIGHTLY
Status: DISCONTINUED | OUTPATIENT
Start: 2020-07-11 | End: 2020-07-13 | Stop reason: HOSPADM

## 2020-07-11 RX ORDER — ATORVASTATIN CALCIUM 10 MG/1
20 TABLET, FILM COATED ORAL DAILY
Qty: 30 TABLET | Refills: 3 | Status: SHIPPED | OUTPATIENT
Start: 2020-07-11

## 2020-07-11 RX ORDER — DEXTROSE MONOHYDRATE 25 G/50ML
12.5 INJECTION, SOLUTION INTRAVENOUS PRN
Status: DISCONTINUED | OUTPATIENT
Start: 2020-07-11 | End: 2020-07-11 | Stop reason: SDUPTHER

## 2020-07-11 RX ORDER — NICOTINE POLACRILEX 4 MG
15 LOZENGE BUCCAL PRN
Status: DISCONTINUED | OUTPATIENT
Start: 2020-07-11 | End: 2020-07-11 | Stop reason: SDUPTHER

## 2020-07-11 RX ORDER — INSULIN LISPRO 100 [IU]/ML
0.08 INJECTION, SOLUTION INTRAVENOUS; SUBCUTANEOUS
Status: DISCONTINUED | OUTPATIENT
Start: 2020-07-11 | End: 2020-07-13

## 2020-07-11 RX ADMIN — ATORVASTATIN CALCIUM 20 MG: 20 TABLET, FILM COATED ORAL at 20:53

## 2020-07-11 RX ADMIN — PREGABALIN 50 MG: 50 CAPSULE ORAL at 08:19

## 2020-07-11 RX ADMIN — TETROFOSMIN 9.9 MILLICURIE: 1.38 INJECTION, POWDER, LYOPHILIZED, FOR SOLUTION INTRAVENOUS at 10:06

## 2020-07-11 RX ADMIN — DOCUSATE SODIUM 100 MG: 100 CAPSULE, LIQUID FILLED ORAL at 20:53

## 2020-07-11 RX ADMIN — INSULIN LISPRO 7 UNITS: 100 INJECTION, SOLUTION INTRAVENOUS; SUBCUTANEOUS at 18:09

## 2020-07-11 RX ADMIN — PANTOPRAZOLE SODIUM 40 MG: 40 TABLET, DELAYED RELEASE ORAL at 06:22

## 2020-07-11 RX ADMIN — Medication 10 ML: at 10:06

## 2020-07-11 RX ADMIN — TRAZODONE HYDROCHLORIDE 50 MG: 50 TABLET ORAL at 20:53

## 2020-07-11 RX ADMIN — INSULIN LISPRO 7 UNITS: 100 INJECTION, SOLUTION INTRAVENOUS; SUBCUTANEOUS at 20:54

## 2020-07-11 RX ADMIN — Medication 10 ML: at 11:19

## 2020-07-11 RX ADMIN — TETROFOSMIN 26.7 MILLICURIE: 1.38 INJECTION, POWDER, LYOPHILIZED, FOR SOLUTION INTRAVENOUS at 11:19

## 2020-07-11 RX ADMIN — PREDNISONE 40 MG: 20 TABLET ORAL at 16:31

## 2020-07-11 RX ADMIN — INSULIN LISPRO 4 UNITS: 100 INJECTION, SOLUTION INTRAVENOUS; SUBCUTANEOUS at 08:16

## 2020-07-11 RX ADMIN — DOCUSATE SODIUM 100 MG: 100 CAPSULE, LIQUID FILLED ORAL at 16:31

## 2020-07-11 RX ADMIN — REGADENOSON 0.4 MG: 0.08 INJECTION, SOLUTION INTRAVENOUS at 11:18

## 2020-07-11 RX ADMIN — DOCUSATE SODIUM 100 MG: 100 CAPSULE, LIQUID FILLED ORAL at 08:18

## 2020-07-11 RX ADMIN — INSULIN LISPRO 7 UNITS: 100 INJECTION, SOLUTION INTRAVENOUS; SUBCUTANEOUS at 12:44

## 2020-07-11 RX ADMIN — ASPIRIN 81 MG: 81 TABLET, CHEWABLE ORAL at 08:19

## 2020-07-11 RX ADMIN — HYDROCHLOROTHIAZIDE 25 MG: 25 TABLET ORAL at 08:19

## 2020-07-11 RX ADMIN — PREGABALIN 50 MG: 50 CAPSULE ORAL at 20:53

## 2020-07-11 RX ADMIN — CETIRIZINE HYDROCHLORIDE 10 MG: 10 TABLET, FILM COATED ORAL at 08:18

## 2020-07-11 RX ADMIN — INSULIN LISPRO 9 UNITS: 100 INJECTION, SOLUTION INTRAVENOUS; SUBCUTANEOUS at 12:47

## 2020-07-11 RX ADMIN — Medication 10 ML: at 20:53

## 2020-07-11 RX ADMIN — ENOXAPARIN SODIUM 40 MG: 40 INJECTION SUBCUTANEOUS at 08:18

## 2020-07-11 RX ADMIN — LOSARTAN POTASSIUM 100 MG: 100 TABLET, FILM COATED ORAL at 08:19

## 2020-07-11 RX ADMIN — INSULIN LISPRO 6 UNITS: 100 INJECTION, SOLUTION INTRAVENOUS; SUBCUTANEOUS at 18:10

## 2020-07-11 RX ADMIN — Medication 10 ML: at 08:28

## 2020-07-11 ASSESSMENT — PAIN SCALES - GENERAL
PAINLEVEL_OUTOF10: 0

## 2020-07-11 NOTE — PROGRESS NOTES
Pt In bed resting. No new changes overnight. Denies chest pain. Remains NPO for stress test. COVID negative.

## 2020-07-11 NOTE — PROGRESS NOTES
Melo Valencia RN and Niki murray RN confirmed pt had double negative covid test.  negative and [facility] negative. Apparently, our lab had called a positive on the wrong pt. Pt now has been tested twice an results are negative twice.

## 2020-07-11 NOTE — PROGRESS NOTES
Confirmed with lab that patient is in fact COVID19 negative, as there was confusion on test results yesterday.   Electronically signed by Garland Trujillo RN on 7/11/2020 at 9:13 AM

## 2020-07-11 NOTE — PROGRESS NOTES
Hospitalist Progress Note      PCP: Meseret ALMONTEDABBBRITTNI    Date of Admission: 7/8/2020    Chief Complaint: chest pain    Subjective:  Patient seen and examined at the bedside. No complaints at this time. No chest pain. PFHS: reviewed as documented 7/8/2020, no changes    Medications:  Reviewed    Infusion Medications    dextrose       Scheduled Medications    atorvastatin  20 mg Oral Nightly    insulin glargine  30 Units Subcutaneous Nightly    insulin lispro  0-12 Units Subcutaneous TID WC    insulin lispro  0-6 Units Subcutaneous Nightly    aspirin  81 mg Oral Daily    docusate sodium  100 mg Oral TID    cetirizine  10 mg Oral Daily    hydroCHLOROthiazide  25 mg Oral Daily    losartan  100 mg Oral Daily    pantoprazole  40 mg Oral QAM AC    pregabalin  50 mg Oral BID    traZODone  50 mg Oral Nightly    sodium chloride flush  10 mL Intravenous 2 times per day    enoxaparin  40 mg Subcutaneous Daily     PRN Meds: calcium carbonate, sodium chloride flush, acetaminophen **OR** acetaminophen, polyethylene glycol, promethazine **OR** ondansetron, glucose, dextrose, glucagon (rDNA), dextrose, regadenoson, nitroGLYCERIN      Intake/Output Summary (Last 24 hours) at 7/11/2020 0851  Last data filed at 7/10/2020 2106  Gross per 24 hour   Intake 130 ml   Output --   Net 130 ml         Physical Exam Performed:    BP (!) 147/79   Pulse 79   Temp 98.9 °F (37.2 °C) (Oral)   Resp 16   Ht 5' 2\" (1.575 m)   Wt 192 lb 3.9 oz (87.2 kg)   SpO2 98%   BMI 35.16 kg/m²     General appearance:  No apparent distress, appears stated age  Eyes: Pupils equal, round, reactive to light, conjunctiva/corneas clear  Ears/Nose/Mouth/Throat: No external lesions or scars, hearing intact to voice  Neck: Trachea midline, no masses noted  Respiratory:  Normal respiratory effort.  Clear to auscultation bilaterally  Cardiovascular: Regular rate and rhythm, nl S1/S2, w/o murmurs, rubs or gallops, no lower extremity edema  Abdomen: Soft, non-tender, non-distended, no hepatosplenomegaly  Musculoskeletal: No cyanosis, clubbing or petechiae, no lower extremity misalignment, asymmetry, or crepitation  Skin: Normal skin color, texture, turgor. No rashes or lesions noted. Psychiatric: Alert and oriented x4, good insight and judgment    Labs:   Recent Labs     07/08/20  1819   WBC 6.8   HGB 11.7*   HCT 35.2*        Recent Labs     07/08/20  1819 07/09/20  0438    139   K 4.2 3.6    105   CO2 25 24   BUN 12 11   CREATININE 0.6 0.7   CALCIUM 9.7 9.3     Recent Labs     07/08/20  1819   AST 20   ALT 16   BILITOT 0.8   ALKPHOS 84     Recent Labs     07/08/20 1819   INR 1.05     Recent Labs     07/08/20  1819 07/09/20  0438   TROPONINI <0.01 <0.01       Urinalysis:      Lab Results   Component Value Date    NITRU Negative 07/06/2018    WBCUA 6-10 07/06/2018    BACTERIA 3+ 07/06/2018    RBCUA 0-2 07/06/2018    BLOODU Negative 07/06/2018    SPECGRAV 1.025 07/06/2018    GLUCOSEU >=1000 07/06/2018       Radiology:  CT HEAD WO CONTRAST   Final Result   1. Normal noncontrast CT scan of the head. XR CHEST STANDARD (2 VW)   Final Result   1. No evidence of acute cardiopulmonary disease.        NM Cardiac Stress Test Nuclear Imaging    (Results Pending)       Assessment/Plan:    Active Hospital Problems    Diagnosis Date Noted    Chest pain [R07.9] 07/08/2020       Plan:    # Chest pain  -Likely MSK given pain w/ arm movement  -ASA, statin  -Plan for stress now that COVID negative  -cardiology following    # T2DM  -Continue lantus, low dose SSI    # HTN  -continue losartan, HCTZ    # Neuropathy  -continue lyrica    # GERD  -continue protonix    DVT Prophylaxis: Lovenox  Diet: Diet NPO, After Midnight  Code Status: Full Code    PT/OT Eval Status: deferred    Dispo - inpt, possibly home today if negative stress    Tl Brian MD

## 2020-07-11 NOTE — PROGRESS NOTES
[address]         Reason for Consultation/Chief Complaint: \"I have been having chest pain. \"       History of Present Illness:  Willi Claros is a 60 y.o. patient who presented to the hospital with complaints of chest pain with exertion and at rest associated with shortness of breath but no palpitations nor syncope. There is a very strong family history of coronary artery disease. She has no chest pain presently and has not had a remote angiogram in 2006 with no ischemic disease noted at that time    Past Medical History:   has a past medical history of Asthma, Bipolar 1 disorder ([address] Chronic back pain, DDD (degenerative disc disease), Depression, Diabetes mellitus ([facility] [address] Fibromyalgia, Hyperlipidemia, and Hypertension. Surgical History:   has a past surgical history that includes Hysterectomy; Carpal tunnel release; and Hand surgery. Social History:   reports that she has never smoked. She has never used smokeless tobacco. She reports current alcohol use. She reports that she does not use drugs. Family History:  No evidence for sudden cardiac death or premature CAD    Home Medications:  Were reviewed and are listed in nursing record. and/or listed below  Prior to Admission medications    Medication Sig Start Date End Date Taking? Authorizing Provider   atorvastatin (LIPITOR) 10 MG tablet Take 2 tablets by mouth daily 7/11/20  Yes Teodora Veláqsuez MD   Mometasone Furo-Formoterol Fum (DULERA IN) Inhale into the lungs   Yes Historical Provider, MD   aspirin 81 MG chewable tablet Take 81 mg by mouth daily   Yes Historical Provider, MD   pregabalin (LYRICA) 50 MG capsule Take 50 mg by mouth 2 times daily. .   Yes Historical Provider, MD   cetirizine (ZYRTEC) 10 MG tablet Take 10 mg by mouth daily   Yes Historical Provider, MD   sucralfate (CARAFATE) 1 GM tablet Take 1 tablet by mouth 4 times daily 7/6/18  Yes Mary Yo DO   insulin glargine (LANTUS) 100 UNIT/ML injection vial Inject 43 Units into the skin nightly  Patient taking differently: Inject 55 Units into the skin nightly  2/6/16  Yes Toya Persaud MD   insulin lispro (HUMALOG KWIKPEN) 100 UNIT/ML pen Inject 8 Units into the skin 3 times daily (before meals) Please dispense appropriate amount of pen needles 2/6/16  Yes Toya Persaud MD   metFORMIN (GLUCOPHAGE) 500 MG tablet Take 2 tablets by mouth 2 times daily (with meals). 6/3/14  Yes Lucia Carreno MD   albuterol (PROVENTIL HFA;VENTOLIN HFA) 108 (90 BASE) MCG/ACT inhaler Inhale 2 puffs into the lungs every 6 hours as needed for Wheezing. Yes Historical Provider, MD   docusate sodium (COLACE) 100 MG capsule Take 100 mg by mouth three times daily. Yes Historical Provider, MD   fluticasone (FLONASE) 50 MCG/ACT nasal spray 2 sprays by Nasal route daily. Yes Historical Provider, MD   hydrochlorothiazide (HYDRODIURIL) 25 MG tablet Take 25 mg by mouth daily. Yes Historical Provider, MD   losartan (COZAAR) 100 MG tablet Take 100 mg by mouth daily. Yes Historical Provider, MD   meclizine (ANTIVERT) 25 MG tablet Take 25 mg by mouth 3 times daily as needed. Yes Historical Provider, MD   omeprazole (PRILOSEC) 20 MG capsule Take 40 mg by mouth every morning (before breakfast). Yes Historical Provider, MD   polyethylene glycol (GLYCOLAX) packet Take 17 g by mouth 3 times daily. Yes Historical Provider, MD   sodium chloride (OCEAN) 0.65 % nasal spray 2 sprays by Nasal route as needed for Congestion. Yes Historical Provider, MD   spironolactone (ALDACTONE) 50 MG tablet Take 50 mg by mouth 2 times daily. Yes Historical Provider, MD   traZODone (DESYREL) 50 MG tablet Take 50 mg by mouth nightly. Yes Historical Provider, MD   EPINEPHrine (EPIPEN) 0.3 MG/0.3ML SOAJ injection Inject 0.3 mg into the muscle as needed.  Use as directed for allergic reaction    Historical Provider, MD        Hospital Medications:   insulin lispro  0-18 Units Subcutaneous TID WC Examination:    Vitals:    07/11/20 1248   BP: 137/83   Pulse: 69   Resp: 16   Temp: 97.7 °F (36.5 °C)   SpO2: 98%    Weight: 192 lb 3.9 oz (87.2 kg)         General Appearance:  Alert, cooperative, no distress, appears stated age   Head:  Normocephalic, without obvious abnormality, atraumatic   Eyes:  PERRL   Nose: Nares normal,   Neck: Supple, JVP normal   Lungs:   Clear to auscultation bilaterally, respirations unlabored   Chest Wall:  No tenderness or deformity   Heart:  Regular rate and rhythm, normal S1, S2 normal, no murmur, No rub. No S3 / S4  gallop   Abdomen:   Soft, non-tender, +bowel sounds   Extremities: no cyanosis, no clubbing , No   edema   Pulses: Symmetric extremities   Skin: no gross lesions or rashes   Pysch: Normal mood and affect   Neurologic: No gross deficits. CN II - XII grossly intact        Labs  CBC:   Lab Results   Component Value Date    WBC 6.8 07/08/2020    RBC 3.81 07/08/2020    HGB 11.7 07/08/2020    HCT 35.2 07/08/2020    MCV 92.2 07/08/2020    RDW 12.6 07/08/2020     07/08/2020     CMP:    Lab Results   Component Value Date     07/09/2020    K 3.6 07/09/2020     07/09/2020    CO2 24 07/09/2020    BUN 11 07/09/2020    CREATININE 0.7 07/09/2020    GFRAA >60 07/09/2020    AGRATIO 1.7 07/08/2020    LABGLOM >60 07/09/2020    GLUCOSE 119 07/09/2020    PROT 7.3 07/08/2020    CALCIUM 9.3 07/09/2020    BILITOT 0.8 07/08/2020    ALKPHOS 84 07/08/2020    AST 20 07/08/2020    ALT 16 07/08/2020     PT/INR:  No results found for: PTINR  Recent Labs     07/08/20  1819 07/09/20  0438   TROPONINI <0.01 <0.01       EKG:  SR with nonspecific ST/T wave abnormalities    Assessment  Patient Active Problem List   Diagnosis    Syncope and collapse    Community acquired bacterial pneumonia        Impression: Typical angina. Abnormal nuclear cardiac stress test.  negative troponin for MI.     Recommendations:    I had the opportunity to review the clinical symptoms and presentation of Johnna Earl. I recommend that the patient undergo further cardiac evaluation with cardiac catheterization with possible coronary intervention. The risk benefits and alternatives were explained to the patient and she wishes to proceed on Monday. I recommend that the patient be treated with lopressor The patient will be maintained on telemetry   Thank you for allowing to us to participate in the care or Johnna Earl. All questions and concerns were addressed to the patient/family. Alternatives to my treatment were discussed. The note was completed using EMR. Every effort was made to ensure accuracy; however, inadvertent computerized transcription errors may be present.        Lu Sandoval MD [facility]

## 2020-07-12 LAB
GLUCOSE BLD-MCNC: 259 MG/DL (ref 70–99)
GLUCOSE BLD-MCNC: 292 MG/DL (ref 70–99)
GLUCOSE BLD-MCNC: 344 MG/DL (ref 70–99)
GLUCOSE BLD-MCNC: 421 MG/DL (ref 70–99)
GLUCOSE BLD-MCNC: 423 MG/DL (ref 70–99)
GLUCOSE BLD-MCNC: 483 MG/DL (ref 70–99)
PERFORMED ON: ABNORMAL

## 2020-07-12 PROCEDURE — 6360000002 HC RX W HCPCS: Performed by: INTERNAL MEDICINE

## 2020-07-12 PROCEDURE — 6370000000 HC RX 637 (ALT 250 FOR IP): Performed by: INTERNAL MEDICINE

## 2020-07-12 PROCEDURE — 36415 COLL VENOUS BLD VENIPUNCTURE: CPT

## 2020-07-12 PROCEDURE — 2580000003 HC RX 258: Performed by: NURSE PRACTITIONER

## 2020-07-12 PROCEDURE — 99233 SBSQ HOSP IP/OBS HIGH 50: CPT | Performed by: NURSE PRACTITIONER

## 2020-07-12 PROCEDURE — 2580000003 HC RX 258: Performed by: INTERNAL MEDICINE

## 2020-07-12 PROCEDURE — 6370000000 HC RX 637 (ALT 250 FOR IP): Performed by: STUDENT IN AN ORGANIZED HEALTH CARE EDUCATION/TRAINING PROGRAM

## 2020-07-12 PROCEDURE — 1200000000 HC SEMI PRIVATE

## 2020-07-12 PROCEDURE — 83036 HEMOGLOBIN GLYCOSYLATED A1C: CPT

## 2020-07-12 RX ORDER — SODIUM CHLORIDE 0.9 % (FLUSH) 0.9 %
10 SYRINGE (ML) INJECTION EVERY 12 HOURS SCHEDULED
Status: DISCONTINUED | OUTPATIENT
Start: 2020-07-12 | End: 2020-07-12 | Stop reason: SDUPTHER

## 2020-07-12 RX ORDER — 0.9 % SODIUM CHLORIDE 0.9 %
500 INTRAVENOUS SOLUTION INTRAVENOUS ONCE
Status: COMPLETED | OUTPATIENT
Start: 2020-07-12 | End: 2020-07-13

## 2020-07-12 RX ORDER — SODIUM CHLORIDE 9 MG/ML
INJECTION, SOLUTION INTRAVENOUS CONTINUOUS
Status: DISCONTINUED | OUTPATIENT
Start: 2020-07-12 | End: 2020-07-13 | Stop reason: SDUPTHER

## 2020-07-12 RX ORDER — SODIUM CHLORIDE 0.9 % (FLUSH) 0.9 %
10 SYRINGE (ML) INJECTION PRN
Status: DISCONTINUED | OUTPATIENT
Start: 2020-07-12 | End: 2020-07-12 | Stop reason: SDUPTHER

## 2020-07-12 RX ADMIN — PREGABALIN 50 MG: 50 CAPSULE ORAL at 22:16

## 2020-07-12 RX ADMIN — HYDROCHLOROTHIAZIDE 25 MG: 25 TABLET ORAL at 10:04

## 2020-07-12 RX ADMIN — INSULIN HUMAN 10 UNITS: 100 INJECTION, SOLUTION PARENTERAL at 22:17

## 2020-07-12 RX ADMIN — CETIRIZINE HYDROCHLORIDE 10 MG: 10 TABLET, FILM COATED ORAL at 10:04

## 2020-07-12 RX ADMIN — INSULIN LISPRO 7 UNITS: 100 INJECTION, SOLUTION INTRAVENOUS; SUBCUTANEOUS at 10:02

## 2020-07-12 RX ADMIN — SODIUM CHLORIDE 500 ML: 900 INJECTION, SOLUTION INTRAVENOUS at 22:20

## 2020-07-12 RX ADMIN — PREDNISONE 40 MG: 20 TABLET ORAL at 10:04

## 2020-07-12 RX ADMIN — INSULIN LISPRO 18 UNITS: 100 INJECTION, SOLUTION INTRAVENOUS; SUBCUTANEOUS at 16:31

## 2020-07-12 RX ADMIN — PREGABALIN 50 MG: 50 CAPSULE ORAL at 10:04

## 2020-07-12 RX ADMIN — PANTOPRAZOLE SODIUM 40 MG: 40 TABLET, DELAYED RELEASE ORAL at 06:02

## 2020-07-12 RX ADMIN — DOCUSATE SODIUM 100 MG: 100 CAPSULE, LIQUID FILLED ORAL at 22:16

## 2020-07-12 RX ADMIN — DOCUSATE SODIUM 100 MG: 100 CAPSULE, LIQUID FILLED ORAL at 10:04

## 2020-07-12 RX ADMIN — INSULIN LISPRO 7 UNITS: 100 INJECTION, SOLUTION INTRAVENOUS; SUBCUTANEOUS at 12:45

## 2020-07-12 RX ADMIN — TRAZODONE HYDROCHLORIDE 50 MG: 50 TABLET ORAL at 22:17

## 2020-07-12 RX ADMIN — INSULIN LISPRO 9 UNITS: 100 INJECTION, SOLUTION INTRAVENOUS; SUBCUTANEOUS at 12:45

## 2020-07-12 RX ADMIN — Medication 10 ML: at 10:05

## 2020-07-12 RX ADMIN — ENOXAPARIN SODIUM 40 MG: 40 INJECTION SUBCUTANEOUS at 10:04

## 2020-07-12 RX ADMIN — INSULIN LISPRO 9 UNITS: 100 INJECTION, SOLUTION INTRAVENOUS; SUBCUTANEOUS at 10:01

## 2020-07-12 RX ADMIN — ATORVASTATIN CALCIUM 20 MG: 20 TABLET, FILM COATED ORAL at 22:16

## 2020-07-12 RX ADMIN — LOSARTAN POTASSIUM 100 MG: 100 TABLET, FILM COATED ORAL at 10:04

## 2020-07-12 RX ADMIN — SODIUM CHLORIDE: 9 INJECTION, SOLUTION INTRAVENOUS at 22:20

## 2020-07-12 RX ADMIN — ASPIRIN 81 MG: 81 TABLET, CHEWABLE ORAL at 10:04

## 2020-07-12 RX ADMIN — DOCUSATE SODIUM 100 MG: 100 CAPSULE, LIQUID FILLED ORAL at 14:19

## 2020-07-12 RX ADMIN — INSULIN LISPRO 7 UNITS: 100 INJECTION, SOLUTION INTRAVENOUS; SUBCUTANEOUS at 16:32

## 2020-07-12 ASSESSMENT — PAIN SCALES - GENERAL
PAINLEVEL_OUTOF10: 0

## 2020-07-12 NOTE — PROGRESS NOTES
Patient AOx4 and able to make needs known; compliant with all medications as ordered; RRR; VSS; bowel sounds audible; patient is up ad david; voiding well; consuming 100% of nutrition; table within reach; bed in lowest position; call light within reach; will continue to monitor.        Electronically signed by Liz Tyler RN on 7/12/20 at 12:57 PM EDT

## 2020-07-12 NOTE — PROGRESS NOTES
, pt with no c/o any at this time, lying in bed talking, laughing on cell phone. States \"that's from all those steroids they gave me! \" 25u Humalog administered per order. Dr Kimberli Shane notified via perfect serve.

## 2020-07-12 NOTE — PROGRESS NOTES
4 Eyes Admission Assessment     I agree as the admission nurse that 2 RN's have performed a thorough Head to Toe Skin Assessment on the patient. ALL assessment sites listed below have been assessed on admission. Areas assessed by both nurses: yes  [x]   Head, Face, and Ears   [x]   Shoulders, Back, and Chest  [x]   Arms, Elbows, and Hands   [x]   Coccyx, Sacrum, and Ischum  [x]   Legs, Feet, and Heels        Does the Patient have Skin Breakdown?   No         Maurice Prevention initiated:  No   Wound Care Orders initiated:  No      [facility] nurse consulted for Pressure Injury (Stage 3,4, Unstageable, DTI, NWPT, and Complex wounds):  No      Nurse 1 eSignature: Electronically signed by Nerissa Berumen RN on 7/12/20 at 6:57 PM EDT    **SHARE this note so that the co-signing nurse is able to place an eSignature**    Nurse 2 eSignature: {Esignature:[id number]}

## 2020-07-12 NOTE — PROGRESS NOTES
Pt arrived to unit, A&O X4, calm, very pleasant, cooperative, ambulating in room independently, oriented to room and use of call light. Denies CP/ SOB, no c/o any at this time. Refusing bed alarm, calls appropriately, call light in reach, will cont to monitor.

## 2020-07-12 NOTE — PROGRESS NOTES
[address]       In Patient  Progress note        Ammon Marcelo MD,  [address] FNP [facility]   Daily Progress Note      Admit Date:  7/8/2020  Primary Cardiologist : Breanne Mak     CC: cp     Interval Hx:  Ms. Prachi Samaniego was admitted with left arm and hand numbness with weak left hand & cp that radiates from shoulder to left hand, No identifiable aggravating or relieving factors. Patient denies any fever, chills, cough, shortness of breath, URI symptoms, diarrhea or urinary symptoms. PMH TIA/s migraines / echo with bubble study ordered   Trop neg x 3   LDL 82 /sCr 0.7 on 7/9/20 / Hgb 11.7   7/10/20 SARS-CoV-2 not detected    Angina with abnormal nuclear cardiac stress test /  negative troponin for MI.  [facility] indicated, risks benefits & alternatives has been discussed   Plan [facility] tomorrow am approx 10 a with Dr. Breanne Mak / blood work ordered for in am     7/11/20 stress test   There is subtle ischemia in the inferolateral wall of the LV.     There is excessive enteric uptake that may be producing artifact in this     region. The Sum Difference score is 3 and normal is less than 4.     The LVEF is normal at 73% and the LV wall motion is normal.      PMH:  has a past medical history of Asthma, Bipolar 1 disorder ([address] Chronic back pain, DDD (degenerative disc disease), Depression, Diabetes mellitus ([address] Fibromyalgia, Hyperlipidemia, and Hypertension. I have examined pt and reviewed notes / any lab work EKGs stress test, angiograms, & images reviewed  I  have spent >20  minutes of face to face time with the patient with more than 50% spent counseling and coordinating care this patient.       Objective:   BP (!) 140/81   Pulse 80   Temp 97.5 °F (36.4 °C) (Oral)   Resp 16   Ht 5' 2\" (1.575 m)   Wt 189 lb 2.5 oz (85.8 kg)   SpO2 98%   BMI 34.60 kg/m²       Intake/Output Summary (Last 24 hours) at 7/12/2020 1004  Last data filed at 7/11/2020 2053  Gross per 24 hour   Intake 250 ml   Output --   Net 250 ml     Wt Readings from Last 3 Encounters:   07/12/20 189 lb 2.5 oz (85.8 kg)   01/06/19 196 lb (88.9 kg)   09/12/18 194 lb 12.8 oz (88.4 kg)       Physical Exam:   BP (!) 140/81   Pulse 80   Temp 97.5 °F (36.4 °C) (Oral)   Resp 16   Ht 5' 2\" (1.575 m)   Wt 189 lb 2.5 oz (85.8 kg)   SpO2 98%   BMI 34.60 kg/m²   BP Readings from Last 3 Encounters:   07/12/20 (!) 140/81   01/06/19 133/74   09/12/18 134/79     Pulse Readings from Last 3 Encounters:   07/12/20 80   01/06/19 91   09/12/18 82       Intake/Output Summary (Last 24 hours) at 7/12/2020 1004  Last data filed at 7/11/2020 2053  Gross per 24 hour   Intake 250 ml   Output --   Net 250 ml     Wt Readings from Last 2 Encounters:   07/12/20 189 lb 2.5 oz (85.8 kg)   01/06/19 196 lb (88.9 kg)     Constitutional: She is oriented to person, place, and time. She appears well-developed and well-nourished. In no acute distress. Head: Normocephalic and atraumatic. Eyes: PEERL   Neck: Neck supple. No JVD present. Carotid bruit is not present. No mass and no thyromegaly present. No lymphadenopathy present. Cardiovascular: Normal rate, regular rhythm, normal heart sounds and intact distal pulses. Exam reveals no gallop and no friction rub. No murmur heard. Pulmonary/Chest: Effort normal and breath sounds normal. No respiratory distress. She has no wheezes, rhonchi or rales. Abdominal: Soft, non-tender. Bowel sounds and aorta are normal. She exhibits no organomegaly, mass or bruit. Extremities: No edema, cyanosis, or clubbing. Pulses are 2+ radial/carotid/dorsalis pedis and posterior tibial bilaterally. Neurological: oriented to person place    Skin: Skin is warm and dry. There is no rash or diaphoresis. Psychiatric: She has a normal mood and affect.  Her speech is normal and behavior is normal.     Medications:    insulin lispro  0-18 Units Subcutaneous TID WC    insulin lispro  0-9 Units Subcutaneous Nightly    insulin lispro  0.08 Units/kg Subcutaneous TID WC    predniSONE  40 mg Oral Daily    insulin glargine  45 Units Subcutaneous Nightly    atorvastatin  20 mg Oral Nightly    aspirin  81 mg Oral Daily    docusate sodium  100 mg Oral TID    cetirizine  10 mg Oral Daily    hydroCHLOROthiazide  25 mg Oral Daily    losartan  100 mg Oral Daily    pantoprazole  40 mg Oral QAM AC    pregabalin  50 mg Oral BID    traZODone  50 mg Oral Nightly    sodium chloride flush  10 mL Intravenous 2 times per day    enoxaparin  40 mg Subcutaneous Daily      dextrose         Reviewed  available lab work,  EKGs, images, LHC       Assessment    admitted with left arm & hand numbness with weak left hand & cp that radiates from shoulder to left hand, No identifiable aggravating or relieving factors. Trop neg x 3     7/11/20 stress test   There is subtle ischemia in the inferolateral wall of the LV.     There is excessive enteric uptake that may be producing artifact in this     region.  The Sum Difference score is 3 and normal is less than 4.     The LVEF is normal at 73% and the LV wall motion is normal.      HTN  optimal b/p     HLD   LDL 82  On lipitor     DMT2  Per IM managed     TIA/s migraines   Echo with bubble study recommended     Asthma  stable      Bipolar 1 disorder /depression / fibromyalgia   Stable     Chronic back pain/ DDD   Managed per IM     Plan:  Angina with abnormal nuclear cardiac stress test/negative troponin for MI.  [facility] indicated, risks benefits & alternatives has been discussed   Plan [facility] tomorrow am approx 10 am with Dr. Augie Rothman / blood work ordered for in am   Recommend echo with hx migraines & TIA   Hosp card meds: lipitor asa HCTZ giselle PATEL, CVNP

## 2020-07-12 NOTE — PROGRESS NOTES
Patient transported via wheelchair to 6S without incident      Electronically signed by Mehran Gomez RN on 7/12/20 at 3:20 PM EDT

## 2020-07-12 NOTE — PLAN OF CARE
Problem: Respiratory:  Goal: Ability to maintain a clear airway will improve  Description: Ability to maintain a clear airway will improve  Outcome: Ongoing  Note: COVID results rec'd today, patient tested negative for COVID-19. Patient exhibits no s/s of impaired airway or respiratory distress at this time. Spo2 remains greater than 93% on room air, patient denies any SOB. Continue to monitor. Problem: Falls - Risk of:  Goal: Will remain free from falls  Description: Will remain free from falls  Outcome: Ongoing  Note:  Pt is a Low fall risk. See Hua Sprain Fall Score and ABCDS Injury Risk assessments. Pt bed is in low position, side rails up, call light and belongings are in reach. Fall risk light is on outside pts room. Pt encouraged to call for assistance as needed. Will continue with hourly rounds for PO intake, pain needs, toileting and repositioning as needed. Problem: Pain:  Goal: Pain level will decrease  Outcome: Ongoing  Note: Numerical pain scale utilized to assess patient's pain. Patient denies any pain at this time. Continue to monitor for any increasing or uncontrolled pain needs.

## 2020-07-12 NOTE — PROGRESS NOTES
Hospitalist Progress Note      PCP: Eric SCHAEFFER    Date of Admission: 7/8/2020    Chief Complaint: chest pain    Summary:  55yoF admitted with chest pain, stress test 7/11 abnormal, so now awaiting [facility] Monday. Subjective:  Patient seen and examined at the bedside. Stress test abnormal yesterday so plan for cath tomorrow. No chest pain at this time.     PFHS: reviewed as documented 7/8/2020, no changes    Medications:  Reviewed    Infusion Medications    dextrose       Scheduled Medications    insulin lispro  0-18 Units Subcutaneous TID WC    insulin lispro  0-9 Units Subcutaneous Nightly    insulin lispro  0.08 Units/kg Subcutaneous TID WC    predniSONE  40 mg Oral Daily    insulin glargine  45 Units Subcutaneous Nightly    atorvastatin  20 mg Oral Nightly    aspirin  81 mg Oral Daily    docusate sodium  100 mg Oral TID    cetirizine  10 mg Oral Daily    hydroCHLOROthiazide  25 mg Oral Daily    losartan  100 mg Oral Daily    pantoprazole  40 mg Oral QAM AC    pregabalin  50 mg Oral BID    traZODone  50 mg Oral Nightly    sodium chloride flush  10 mL Intravenous 2 times per day    enoxaparin  40 mg Subcutaneous Daily     PRN Meds: calcium carbonate, sodium chloride flush, acetaminophen **OR** acetaminophen, polyethylene glycol, promethazine **OR** ondansetron, glucose, dextrose, glucagon (rDNA), dextrose, nitroGLYCERIN      Intake/Output Summary (Last 24 hours) at 7/12/2020 7454  Last data filed at 7/11/2020 2053  Gross per 24 hour   Intake 250 ml   Output --   Net 250 ml         Physical Exam Performed:    /72   Pulse 76   Temp 98.6 °F (37 °C) (Oral)   Resp 16   Ht 5' 2\" (1.575 m)   Wt 189 lb 2.5 oz (85.8 kg)   SpO2 96%   BMI 34.60 kg/m²     General appearance:  No apparent distress, appears stated age  Eyes: Pupils equal, round, reactive to light, conjunctiva/corneas clear  Ears/Nose/Mouth/Throat: No external lesions or scars, hearing intact to voice  Neck: Trachea midline, no masses noted  Respiratory:  Normal respiratory effort. Clear to auscultation bilaterally  Cardiovascular: Regular rate and rhythm, nl S1/S2, w/o murmurs, rubs or gallops, no lower extremity edema  Abdomen: Soft, non-tender, non-distended, no hepatosplenomegaly  Musculoskeletal: No cyanosis, clubbing or petechiae, no lower extremity misalignment, asymmetry, or crepitation  Skin: Normal skin color, texture, turgor. No rashes or lesions noted. Psychiatric: Alert and oriented x4, good insight and judgment    Labs:   No results for input(s): WBC, HGB, HCT, PLT in the last 72 hours. No results for input(s): NA, K, CL, CO2, BUN, CREATININE, CALCIUM, PHOS in the last 72 hours. Invalid input(s): MAGNES  No results for input(s): AST, ALT, BILIDIR, BILITOT, ALKPHOS in the last 72 hours. No results for input(s): INR in the last 72 hours. No results for input(s): Rachna Norm in the last 72 hours. Urinalysis:      Lab Results   Component Value Date    NITRU Negative 07/06/2018    WBCUA 6-10 07/06/2018    BACTERIA 3+ 07/06/2018    RBCUA 0-2 07/06/2018    BLOODU Negative 07/06/2018    SPECGRAV 1.025 07/06/2018    GLUCOSEU >=1000 07/06/2018       Radiology:  NM Cardiac Stress Test Nuclear Imaging   Final Result      CT HEAD WO CONTRAST   Final Result   1. Normal noncontrast CT scan of the head. XR CHEST STANDARD (2 VW)   Final Result   1. No evidence of acute cardiopulmonary disease. Assessment/Plan:    There are no active hospital problems to display for this patient.       Plan:    # Chest pain  -MSK given pain w/ arm movement?  -ASA, statin  -Abnormal stress, plan for cath tomorrow AM  -cardiology following     # T2DM  -Continue lantus, low dose SSI     # HTN  -continue losartan, HCTZ     # Neuropathy  -continue lyrica     # GERD  -continue protonix    DVT Prophylaxis: Lovenox  Diet: DIET CARB CONTROL; No Caffeine  Code Status: Full Code    PT/OT Eval Status: deferred    Dispo - Senait Caruso MD

## 2020-07-13 VITALS
BODY MASS INDEX: 34.81 KG/M2 | DIASTOLIC BLOOD PRESSURE: 81 MMHG | HEART RATE: 86 BPM | OXYGEN SATURATION: 95 % | HEIGHT: 62 IN | WEIGHT: 189.15 LBS | SYSTOLIC BLOOD PRESSURE: 145 MMHG | TEMPERATURE: 98 F | RESPIRATION RATE: 16 BRPM

## 2020-07-13 LAB
A/G RATIO: 1.6 (ref 1.1–2.2)
ALBUMIN SERPL-MCNC: 4.4 G/DL (ref 3.4–5)
ALP BLD-CCNC: 79 U/L (ref 40–129)
ALT SERPL-CCNC: 19 U/L (ref 10–40)
ANION GAP SERPL CALCULATED.3IONS-SCNC: 10 MMOL/L (ref 3–16)
AST SERPL-CCNC: 16 U/L (ref 15–37)
BILIRUB SERPL-MCNC: 0.6 MG/DL (ref 0–1)
BUN BLDV-MCNC: 20 MG/DL (ref 7–20)
CALCIUM SERPL-MCNC: 9.4 MG/DL (ref 8.3–10.6)
CHLORIDE BLD-SCNC: 103 MMOL/L (ref 99–110)
CHOLESTEROL, TOTAL: 167 MG/DL (ref 0–199)
CO2: 26 MMOL/L (ref 21–32)
CREAT SERPL-MCNC: 0.9 MG/DL (ref 0.6–1.1)
ESTIMATED AVERAGE GLUCOSE: 200.1 MG/DL
GFR AFRICAN AMERICAN: >60
GFR NON-AFRICAN AMERICAN: >60
GLOBULIN: 2.7 G/DL
GLUCOSE BLD-MCNC: 150 MG/DL (ref 70–99)
GLUCOSE BLD-MCNC: 200 MG/DL (ref 70–99)
GLUCOSE BLD-MCNC: 268 MG/DL (ref 70–99)
GLUCOSE BLD-MCNC: 404 MG/DL (ref 70–99)
HBA1C MFR BLD: 8.6 %
HCT VFR BLD CALC: 34.1 % (ref 36–48)
HDLC SERPL-MCNC: 47 MG/DL (ref 40–60)
HEMOGLOBIN: 11.4 G/DL (ref 12–16)
LDL CHOLESTEROL CALCULATED: 100 MG/DL
LEFT VENTRICULAR EJECTION FRACTION MODE: NORMAL
LV EF: 55 %
MAGNESIUM: 1.8 MG/DL (ref 1.8–2.4)
MCH RBC QN AUTO: 31.2 PG (ref 26–34)
MCHC RBC AUTO-ENTMCNC: 33.3 G/DL (ref 31–36)
MCV RBC AUTO: 93.7 FL (ref 80–100)
PDW BLD-RTO: 12.8 % (ref 12.4–15.4)
PERFORMED ON: ABNORMAL
PLATELET # BLD: 292 K/UL (ref 135–450)
PMV BLD AUTO: 8.3 FL (ref 5–10.5)
POTASSIUM SERPL-SCNC: 4.1 MMOL/L (ref 3.5–5.1)
RBC # BLD: 3.64 M/UL (ref 4–5.2)
SODIUM BLD-SCNC: 139 MMOL/L (ref 136–145)
TOTAL PROTEIN: 7.1 G/DL (ref 6.4–8.2)
TRIGL SERPL-MCNC: 101 MG/DL (ref 0–150)
TSH REFLEX: 0.84 UIU/ML (ref 0.27–4.2)
VITAMIN D 25-HYDROXY: 43 NG/ML
VLDLC SERPL CALC-MCNC: 20 MG/DL
WBC # BLD: 11.2 K/UL (ref 4–11)

## 2020-07-13 PROCEDURE — 2500000003 HC RX 250 WO HCPCS

## 2020-07-13 PROCEDURE — 4A023N7 MEASUREMENT OF CARDIAC SAMPLING AND PRESSURE, LEFT HEART, PERCUTANEOUS APPROACH: ICD-10-PCS | Performed by: INTERNAL MEDICINE

## 2020-07-13 PROCEDURE — 82306 VITAMIN D 25 HYDROXY: CPT

## 2020-07-13 PROCEDURE — 2580000003 HC RX 258: Performed by: INTERNAL MEDICINE

## 2020-07-13 PROCEDURE — C1894 INTRO/SHEATH, NON-LASER: HCPCS

## 2020-07-13 PROCEDURE — 6370000000 HC RX 637 (ALT 250 FOR IP): Performed by: INTERNAL MEDICINE

## 2020-07-13 PROCEDURE — 6360000002 HC RX W HCPCS

## 2020-07-13 PROCEDURE — B2111ZZ FLUOROSCOPY OF MULTIPLE CORONARY ARTERIES USING LOW OSMOLAR CONTRAST: ICD-10-PCS | Performed by: INTERNAL MEDICINE

## 2020-07-13 PROCEDURE — 93458 L HRT ARTERY/VENTRICLE ANGIO: CPT

## 2020-07-13 PROCEDURE — C1769 GUIDE WIRE: HCPCS

## 2020-07-13 PROCEDURE — 83735 ASSAY OF MAGNESIUM: CPT

## 2020-07-13 PROCEDURE — 84443 ASSAY THYROID STIM HORMONE: CPT

## 2020-07-13 PROCEDURE — 6370000000 HC RX 637 (ALT 250 FOR IP)

## 2020-07-13 PROCEDURE — 99152 MOD SED SAME PHYS/QHP 5/>YRS: CPT

## 2020-07-13 PROCEDURE — 99152 MOD SED SAME PHYS/QHP 5/>YRS: CPT | Performed by: INTERNAL MEDICINE

## 2020-07-13 PROCEDURE — 85027 COMPLETE CBC AUTOMATED: CPT

## 2020-07-13 PROCEDURE — 80061 LIPID PANEL: CPT

## 2020-07-13 PROCEDURE — 93458 L HRT ARTERY/VENTRICLE ANGIO: CPT | Performed by: INTERNAL MEDICINE

## 2020-07-13 PROCEDURE — 80053 COMPREHEN METABOLIC PANEL: CPT

## 2020-07-13 PROCEDURE — 6360000004 HC RX CONTRAST MEDICATION: Performed by: INTERNAL MEDICINE

## 2020-07-13 PROCEDURE — 36415 COLL VENOUS BLD VENIPUNCTURE: CPT

## 2020-07-13 PROCEDURE — 2709999900 HC NON-CHARGEABLE SUPPLY

## 2020-07-13 RX ORDER — SODIUM CHLORIDE 9 MG/ML
INJECTION, SOLUTION INTRAVENOUS CONTINUOUS
Status: ACTIVE | OUTPATIENT
Start: 2020-07-13 | End: 2020-07-13

## 2020-07-13 RX ORDER — SODIUM CHLORIDE 0.9 % (FLUSH) 0.9 %
10 SYRINGE (ML) INJECTION EVERY 12 HOURS SCHEDULED
Status: DISCONTINUED | OUTPATIENT
Start: 2020-07-13 | End: 2020-07-13 | Stop reason: HOSPADM

## 2020-07-13 RX ORDER — INSULIN LISPRO 100 [IU]/ML
10 INJECTION, SOLUTION INTRAVENOUS; SUBCUTANEOUS
Status: DISCONTINUED | OUTPATIENT
Start: 2020-07-13 | End: 2020-07-13

## 2020-07-13 RX ORDER — ACETAMINOPHEN 325 MG/1
650 TABLET ORAL EVERY 4 HOURS PRN
Status: DISCONTINUED | OUTPATIENT
Start: 2020-07-13 | End: 2020-07-13 | Stop reason: HOSPADM

## 2020-07-13 RX ORDER — SODIUM CHLORIDE 0.9 % (FLUSH) 0.9 %
10 SYRINGE (ML) INJECTION PRN
Status: DISCONTINUED | OUTPATIENT
Start: 2020-07-13 | End: 2020-07-13 | Stop reason: SDUPTHER

## 2020-07-13 RX ORDER — ATROPINE SULFATE 0.4 MG/ML
0.5 AMPUL (ML) INJECTION
Status: DISCONTINUED | OUTPATIENT
Start: 2020-07-13 | End: 2020-07-13 | Stop reason: HOSPADM

## 2020-07-13 RX ORDER — INSULIN LISPRO 100 [IU]/ML
8 INJECTION, SOLUTION INTRAVENOUS; SUBCUTANEOUS
Status: DISCONTINUED | OUTPATIENT
Start: 2020-07-13 | End: 2020-07-13 | Stop reason: HOSPADM

## 2020-07-13 RX ORDER — ONDANSETRON 2 MG/ML
4 INJECTION INTRAMUSCULAR; INTRAVENOUS EVERY 6 HOURS PRN
Status: DISCONTINUED | OUTPATIENT
Start: 2020-07-13 | End: 2020-07-13 | Stop reason: HOSPADM

## 2020-07-13 RX ADMIN — INSULIN LISPRO 18 UNITS: 100 INJECTION, SOLUTION INTRAVENOUS; SUBCUTANEOUS at 16:22

## 2020-07-13 RX ADMIN — INSULIN LISPRO 8 UNITS: 100 INJECTION, SOLUTION INTRAVENOUS; SUBCUTANEOUS at 16:23

## 2020-07-13 RX ADMIN — INSULIN LISPRO 8 UNITS: 100 INJECTION, SOLUTION INTRAVENOUS; SUBCUTANEOUS at 13:11

## 2020-07-13 RX ADMIN — INSULIN LISPRO 3 UNITS: 100 INJECTION, SOLUTION INTRAVENOUS; SUBCUTANEOUS at 13:12

## 2020-07-13 RX ADMIN — Medication 10 ML: at 08:15

## 2020-07-13 RX ADMIN — Medication 10 ML: at 12:21

## 2020-07-13 RX ADMIN — IOVERSOL 90 ML: 636 INJECTION INTRA-ARTERIAL; INTRAVENOUS at 10:34

## 2020-07-13 RX ADMIN — ACETAMINOPHEN 650 MG: 325 TABLET ORAL at 13:13

## 2020-07-13 RX ADMIN — SODIUM CHLORIDE: 9 INJECTION, SOLUTION INTRAVENOUS at 12:01

## 2020-07-13 RX ADMIN — PANTOPRAZOLE SODIUM 40 MG: 40 TABLET, DELAYED RELEASE ORAL at 08:15

## 2020-07-13 RX ADMIN — INSULIN LISPRO 6 UNITS: 100 INJECTION, SOLUTION INTRAVENOUS; SUBCUTANEOUS at 00:24

## 2020-07-13 RX ADMIN — DOCUSATE SODIUM 100 MG: 100 CAPSULE, LIQUID FILLED ORAL at 14:33

## 2020-07-13 ASSESSMENT — ENCOUNTER SYMPTOMS
BACK PAIN: 0
DIARRHEA: 0
ABDOMINAL PAIN: 0
SINUS PAIN: 0
SHORTNESS OF BREATH: 0
COLOR CHANGE: 0
COUGH: 0
EYE PAIN: 0
CONSTIPATION: 1

## 2020-07-13 ASSESSMENT — VISUAL ACUITY: OU: 1

## 2020-07-13 ASSESSMENT — PAIN DESCRIPTION - LOCATION: LOCATION: FLANK;HIP

## 2020-07-13 ASSESSMENT — PAIN DESCRIPTION - DIRECTION: RADIATING_TOWARDS: LEG

## 2020-07-13 ASSESSMENT — PAIN SCALES - GENERAL
PAINLEVEL_OUTOF10: 3
PAINLEVEL_OUTOF10: 2
PAINLEVEL_OUTOF10: 0
PAINLEVEL_OUTOF10: 2

## 2020-07-13 ASSESSMENT — PAIN DESCRIPTION - PAIN TYPE: TYPE: ACUTE PAIN

## 2020-07-13 ASSESSMENT — PAIN DESCRIPTION - PROGRESSION: CLINICAL_PROGRESSION: NOT CHANGED

## 2020-07-13 ASSESSMENT — PAIN DESCRIPTION - FREQUENCY: FREQUENCY: CONTINUOUS

## 2020-07-13 ASSESSMENT — PAIN DESCRIPTION - ONSET: ONSET: PROGRESSIVE

## 2020-07-13 ASSESSMENT — PAIN DESCRIPTION - DESCRIPTORS: DESCRIPTORS: ACHING;PRESSURE

## 2020-07-13 ASSESSMENT — PAIN DESCRIPTION - ORIENTATION: ORIENTATION: RIGHT

## 2020-07-13 ASSESSMENT — PAIN - FUNCTIONAL ASSESSMENT: PAIN_FUNCTIONAL_ASSESSMENT: ACTIVITIES ARE NOT PREVENTED

## 2020-07-13 NOTE — PROCEDURES
[address]               [address] [facility], [address]                            CARDIAC CATHETERIZATION    PATIENT NAME: Gal DANIELS                      :        1965  MED REC NO:   [id number]                          ROOM:       6325  ACCOUNT NO:   [de-identified]                           ADMIT DATE: 2020  PROVIDER:     Alireza Hernandez. Geoff Cox MD    DATE OF PROCEDURE:  2020    INDICATION FOR PROCEDURE:  Chest pain, abnormal nuclear scan with  ischemia in the lateral wall. Mallampati score was 2. ASA score was 2. The start time was 10:15 a.m. The end time was 10:31 a.m. The patient was given Solu-Medrol for an IV  DYE allergy and then she was given Benadryl 25 mg for sedation IV push  along with Versed 1 mg and fentanyl 50 mcg IV push. DESCRIPTION OF PROCEDURE:  Prepped and draped in sterile manner, locally  anesthetized with 2% lidocaine in the right femoral region. A 5-Peruvian  sheath was placed in right femoral artery over the guidewire. JL-4 and  JR-4 catheters were used during the diagnostic procedure. All were  aspirated and flushed prior to use. All catheters were advanced under  fluoroscopic guidance over guidewire and retracted over guidewire. FINDINGS:  The left main coronary artery is normal.  The left anterior  descending coronary artery is normal.  There was SHIN grade 3 flow in  the left anterior descending coronary artery. Diagonal branch is  normal.  Septal  is normal.  Circumflex coronary artery is  normal.  Obtuse marginal branches #1 and #2 were normal.  There may be a  ramus intermedius branch that was normal.  It appears to be a  trifurcation of the left main. There was SHIN grade 3 flow in the left  coronary circulation. JR-4 catheter engages the right coronary artery. Injection shows normal dominant right coronary artery.   Right posterior  descending is normal.  Right posterolateral branch is small, but normal.    The JR-4 catheter was advanced into the left ventricle. The LVEDP  showed heavy respiratory variation, but the average LVEDP was about 18  mmHg. Hand injection in the OVERTON projection with the JR-4 catheter  showed LV ejection fraction of 55% without wall motion abnormality. No  mitral regurgitation was seen. No gradient upon pullback from the left  ventricle to the ascending aorta. Injection of the femoral sheath showed the sheath was located at the  bifurcation. Manual pressure will be applied. ESTIMATED BLOOD LOSS:  Less than 15 mL. CONCLUSIONS:  1. Normal epicardial coronary arteries. 2.  Normal left ventricular systolic function. LVEDP slightly elevated  at 18 mmHg. PLAN:  Bedrest for six hours. Continue risk factor modification and  hydration. Matt Rice MD    D: 07/13/2020 12:27:51       T: 07/13/2020 13:08:10     [email or web address]  Job#: [id number]     Doc#: [id number]    CC:  John Villanueva.  MD Ava Bowman

## 2020-07-13 NOTE — CARE COORDINATION
family were provided with choice of provider; she and her family are in agreement with the discharge plan.     Care Transitions patient: BOB Hargrove, [facility]  Case Management Department  Ph: [phone]  Fax: [phone]

## 2020-07-13 NOTE — PROGRESS NOTES
Pt returned from cath lab, A&O X4, calm, pleasant, VSS, denies CP, nausea, no c/o any at this time, dressing to R groin c/d/i. Resting supine in bed, pt instructed to lie flat for 6 hours, voiced understanding. Call light in reach, bed alarm on for safety. Will cont to monitor.

## 2020-07-13 NOTE — PROGRESS NOTES
pt c/o \"a little\" pressure on R side/ ribs/ chest area described as \"heaviness\". No s/s bleeding/hematoma noted. BP now 149/84. Dr Chester Old notified via perfect serve.

## 2020-07-13 NOTE — PROGRESS NOTES
normal.         Mood and Affect: Mood and affect normal.         Speech: Speech normal.         Behavior: Behavior normal. Behavior is cooperative. Thought Content: Thought content normal.         Cognition and Memory: Cognition normal.         Judgment: Judgment normal.       LABS:    CBC:   Recent Labs     07/13/20 0538   WBC 11.2*   HGB 11.4*   HCT 34.1*      MCV 93.7     Renal:    Recent Labs     07/13/20 0538      K 4.1      CO2 26   BUN 20   CREATININE 0.9   GLUCOSE 268*   CALCIUM 9.4   MG 1.80   ANIONGAP 10     Hepatic:   Recent Labs     07/13/20 0538   AST 16   ALT 19   BILITOT 0.6   PROT 7.1   LABALBU 4.4   ALKPHOS 79     Troponin:   No results for input(s): TROPONINI in the last 72 hours. BNP: No results for input(s): BNP in the last 72 hours. Lipids:   No results for input(s): CHOL, HDL in the last 72 hours. Invalid input(s): LDLCALCU, TRIGLYCERIDE  ABGs:  No results for input(s): PHART, KED2KNM, PO2ART, BHT7PVI, BEART, THGBART, K6KDWOKK, BPS8XBL in the last 72 hours. INR:   No results for input(s): INR in the last 72 hours. Lactate: No results for input(s): LACTATE in the last 72 hours. Cultures:  -----------------------------------------------------------------  RAD:   NM Cardiac Stress Test Nuclear Imaging   Final Result      CT HEAD WO CONTRAST   Final Result   1. Normal noncontrast CT scan of the head. XR CHEST STANDARD (2 VW)   Final Result   1. No evidence of acute cardiopulmonary disease. Assessment/Plan:     58F w/ PMH of T2DM, neuropathy, anemia, migraine, GERD, HLD, bipolar, fibromyalgia, and osteoarthritis presented 07/08/20 w/ chest pain. Chest pain  No ischemic changes on EKG. Troponin negative. Mild risk on NM stress test. But could be musculoskeletal given pain with arm movement. - Plan: ASA 81 mg QD.  Atorvastatin 20 mg QD.   - f/u cardiology recs  - f/u cath angiogram results  - f/u Echo (ordered by cardiology)    T2DM  On home

## 2020-07-13 NOTE — PROGRESS NOTES
pt c/o R hip pain/ tenderness radiating down R leg. States \"I think it's from lying flat. \" No obvious s/s bleeding noted. R groin puncture site dressing c/d/i. Attempted to reposition pt as much as possible. PRN tylenol administered per order. Perfect serve sent to Dr Rose Calix on call, and cath lab notified as well. Will cont to monitor.

## 2020-07-15 LAB
SARS-COV-2: NOT DETECTED
SOURCE: NORMAL

## 2020-07-20 ENCOUNTER — OFFICE VISIT (OUTPATIENT)
Dept: CARDIOLOGY CLINIC | Age: 55
End: 2020-07-20
Payer: MEDICAID

## 2020-07-20 VITALS
DIASTOLIC BLOOD PRESSURE: 60 MMHG | BODY MASS INDEX: 35.74 KG/M2 | SYSTOLIC BLOOD PRESSURE: 120 MMHG | HEART RATE: 88 BPM | WEIGHT: 195.4 LBS | TEMPERATURE: 97.7 F

## 2020-07-20 PROCEDURE — 1036F TOBACCO NON-USER: CPT | Performed by: NURSE PRACTITIONER

## 2020-07-20 PROCEDURE — G8427 DOCREV CUR MEDS BY ELIG CLIN: HCPCS | Performed by: NURSE PRACTITIONER

## 2020-07-20 PROCEDURE — 3017F COLORECTAL CA SCREEN DOC REV: CPT | Performed by: NURSE PRACTITIONER

## 2020-07-20 PROCEDURE — 99214 OFFICE O/P EST MOD 30 MIN: CPT | Performed by: NURSE PRACTITIONER

## 2020-07-20 PROCEDURE — G8419 CALC BMI OUT NRM PARAM NOF/U: HCPCS | Performed by: NURSE PRACTITIONER

## 2020-07-20 PROCEDURE — 1111F DSCHRG MED/CURRENT MED MERGE: CPT | Performed by: NURSE PRACTITIONER

## 2020-07-20 NOTE — PROGRESS NOTES
[employer]  Office Visit           Dilan Miles MD,  Radha Alcantara APRN FNP CVNP       Cardiology             Morris Dejesus  1965    July 20, 2020    Primary Cardiologist:  Devan Nicole       CC: fu after [facility]      Interval Hx: Ms. Marx Creed here after [facility] discussed it   Right groin no complications   No c/o cp/usual SOB / states wasn't sleep study     PMH: type 2 diabetes mellitus, neuropathy, anemia, migraine, GERD, HLD, bipolar, fibromyalgia, and osteoarthritis obesity   I have examined pt and reviewed notes / any lab work EKGs stress test, angiograms, & images reviewed  I  have spent >20 minutes of face to face time with the patient with more than 50% spent counseling and coordinating care this patient. LHC: 7/13/20    1. Normal epicardial coronary arteries. 2.  Normal left ventricular systolic function. LVEDP slightly elevated  at 18 mmHg. Review of Systems:  Constitutional: Denies  fatigue, weakness, night sweats or fever. HEENT: Denies new visual changes, ringing in ears, nosebleeds,nasal congestion  Respiratory: Denies new or change in SOB, PND, orthopnea or cough. Cardiovascular: see HPI  GI: Denies N/V, diarrhea, constipation, abdominal pain, change in bowel habits, melena or hematochezia  : Denies urinary frequency, urgency, incontinence, hematuria or dysuria. Skin: Denies rash, hives, or cyanosis  Musculoskeletal: Denies joint or muscle aches/pain  Neurological: Denies syncope or TIA-like symptoms.   Psychiatric: Denies anxiety, insomnia or depression     Past Medical History:   Diagnosis Date    Asthma     Bipolar 1 disorder ([facility] [address]     Chronic back pain     DDD (degenerative disc disease)     Depression     Diabetes mellitus (HCC)     Fibromyalgia     Hyperlipidemia     Hypertension      Past Surgical History:   Procedure Laterality Date    CARPAL TUNNEL RELEASE      HAND SURGERY      HYSTERECTOMY       Family History   Problem Relation Age of Onset    Heart Disease Mother     Heart Disease Father      Social History     Tobacco Use    Smoking status: Never Smoker    Smokeless tobacco: Never Used   Substance Use Topics    Alcohol use: Yes     Comment: 1-2 times a month    Drug use: No       Allergies   Allergen Reactions    Latex     Doxycycline Anaphylaxis    Flagyl [Metronidazole] Anaphylaxis    Ibuprofen Anaphylaxis    Iv Dye [Iodides] Anaphylaxis     Current Outpatient Medications   Medication Sig Dispense Refill    atorvastatin (LIPITOR) 10 MG tablet Take 2 tablets by mouth daily 30 tablet 3    Mometasone Furo-Formoterol Fum (DULERA IN) Inhale into the lungs      aspirin 81 MG chewable tablet Take 81 mg by mouth daily      cetirizine (ZYRTEC) 10 MG tablet Take 10 mg by mouth daily      sucralfate (CARAFATE) 1 GM tablet Take 1 tablet by mouth 4 times daily 40 tablet 0    insulin glargine (LANTUS) 100 UNIT/ML injection vial Inject 43 Units into the skin nightly 1 vial 3    insulin lispro (HUMALOG KWIKPEN) 100 UNIT/ML pen Inject 8 Units into the skin 3 times daily (before meals) Please dispense appropriate amount of pen needles 5 Pen 1    metFORMIN (GLUCOPHAGE) 500 MG tablet Take 2 tablets by mouth 2 times daily (with meals). 60 tablet 3    docusate sodium (COLACE) 100 MG capsule Take 100 mg by mouth three times daily.  EPINEPHrine (EPIPEN) 0.3 MG/0.3ML SOAJ injection Inject 0.3 mg into the muscle as needed. Use as directed for allergic reaction      fluticasone (FLONASE) 50 MCG/ACT nasal spray 2 sprays by Nasal route daily.  losartan (COZAAR) 100 MG tablet Take 100 mg by mouth daily.  omeprazole (PRILOSEC) 20 MG capsule Take 40 mg by mouth every morning (before breakfast).  polyethylene glycol (GLYCOLAX) packet Take 17 g by mouth 3 times daily.  sodium chloride (OCEAN) 0.65 % nasal spray 2 sprays by Nasal route as needed for Congestion.       spironolactone (ALDACTONE) 50 MG tablet Take 50 mg by mouth 2 times daily.  pregabalin (LYRICA) 50 MG capsule Take 50 mg by mouth 2 times daily. Sriram Massey hydrochlorothiazide (HYDRODIURIL) 25 MG tablet Take 25 mg by mouth daily.  meclizine (ANTIVERT) 25 MG tablet Take 25 mg by mouth 3 times daily as needed.  traZODone (DESYREL) 50 MG tablet Take 50 mg by mouth nightly. No current facility-administered medications for this visit. Physical Exam:   /60   Pulse 88   Temp 97.7 °F (36.5 °C)   Wt 195 lb 6.4 oz (88.6 kg)   BMI 35.74 kg/m²   BP Readings from Last 3 Encounters:   07/20/20 120/60   07/13/20 (!) 145/81   01/06/19 133/74     Pulse Readings from Last 3 Encounters:   07/20/20 88   07/13/20 86   01/06/19 91     Wt Readings from Last 3 Encounters:   07/20/20 195 lb 6.4 oz (88.6 kg)   07/12/20 189 lb 2.5 oz (85.8 kg)   01/06/19 196 lb (88.9 kg)     Constitutional: She is oriented to person, place, and time. She appears well-developed and well-nourished. In no acute distress. HEENT: Normocephalic and atraumatic. Sclerae anicteric. No xanthelasmas. Conjunctiva white, no subconjunctival hemorrhage   External inspection of ears nose teeth & gums   Eyes:PERRLA EOM's intact. Neck: Neck supple. No JVD present. Carotids without bruits. No mass and no thyromegaly present. No lymphadenopathy present. Cardiovascular: RRR, normal S1 and S2; no murmur/gallop or rub, PMI nondisplaced  Pulmonary/Chest: Effort normal.  Lungs clear to auscultation. Chest wall nontender  Abdominal: soft, nontender, nondistended. + bowel sounds; no organomegaly or bruits. Aorta normal  Extremities: No edema, cyanosis, or clubbing. Pulses are 2+ radial/carotid/dorsalis pedis bilaterally. Cap refill brisk. Neurological: No cranial nerve deficit. Psychiatric: She has a normal mood and affect.  Her speech is normal and behavior is normal.     Lab Review:   Lab Results   Component Value Date    TRIG 101 07/13/2020    HDL 47 07/13/2020    LDLCALC 100 07/13/2020 LABVLDL 20 07/13/2020     Lab Results   Component Value Date     07/13/2020    K 4.1 07/13/2020    K 3.6 07/09/2020     07/13/2020    CO2 26 07/13/2020    BUN 20 07/13/2020    CREATININE 0.9 07/13/2020    GLUCOSE 268 07/13/2020    CALCIUM 9.4 07/13/2020      Lab Results   Component Value Date    WBC 11.2 (H) 07/13/2020    HGB 11.4 (L) 07/13/2020    HCT 34.1 (L) 07/13/2020    MCV 93.7 07/13/2020     07/13/2020         I have reviewed any available labs, images, any stress test, LHC on this pt       Assessment:    No c/o cp  / usual SOB   LHC: 7/13/20    1. Normal epicardial coronary arteries. 2.  Normal left ventricular systolic function. LVEDP slightly elevated  at 18 mmHg. type 2 diabetes mellitus, neuropathy,   Man age per IM    TIA   To see neuro      Anemia  Stabl      Migraine bipolar fibromyalgia  She is to see neuro soon     GERD  Stable      HLD     No hx CAD     osteoarthritis     obesity   Body mass index is 35.74 kg/m².      ABBE   needs consult     Plan:  ABBE study   Fu with PCP   Fu with cardiology as needed      Robert Flores APRN,CVNP

## 2021-01-01 ENCOUNTER — APPOINTMENT (OUTPATIENT)
Dept: GENERAL RADIOLOGY | Age: 56
DRG: 720 | End: 2021-01-01
Payer: MEDICAID

## 2021-01-01 ENCOUNTER — HOSPITAL ENCOUNTER (INPATIENT)
Age: 56
LOS: 18 days | DRG: 720 | End: 2021-10-09
Attending: STUDENT IN AN ORGANIZED HEALTH CARE EDUCATION/TRAINING PROGRAM | Admitting: STUDENT IN AN ORGANIZED HEALTH CARE EDUCATION/TRAINING PROGRAM
Payer: MEDICAID

## 2021-01-01 ENCOUNTER — CARE COORDINATION (OUTPATIENT)
Dept: CARE COORDINATION | Age: 56
End: 2021-01-01

## 2021-01-01 ENCOUNTER — OFFICE VISIT (OUTPATIENT)
Dept: CARDIOLOGY CLINIC | Age: 56
End: 2021-01-01
Payer: MEDICAID

## 2021-01-01 ENCOUNTER — HOSPITAL ENCOUNTER (EMERGENCY)
Age: 56
Discharge: HOME | End: 2021-08-02
Attending: EMERGENCY MEDICINE
Payer: MEDICAID

## 2021-01-01 ENCOUNTER — APPOINTMENT (OUTPATIENT)
Dept: CT IMAGING | Age: 56
End: 2021-01-01
Payer: MEDICAID

## 2021-01-01 ENCOUNTER — APPOINTMENT (OUTPATIENT)
Dept: GENERAL RADIOLOGY | Age: 56
End: 2021-01-01
Payer: MEDICAID

## 2021-01-01 ENCOUNTER — HOSPITAL ENCOUNTER (EMERGENCY)
Age: 56
Discharge: HOME | End: 2021-09-17
Attending: EMERGENCY MEDICINE
Payer: MEDICAID

## 2021-01-01 VITALS
HEART RATE: 91 BPM | WEIGHT: 186.2 LBS | SYSTOLIC BLOOD PRESSURE: 129 MMHG | OXYGEN SATURATION: 99 % | TEMPERATURE: 99.6 F | DIASTOLIC BLOOD PRESSURE: 75 MMHG | RESPIRATION RATE: 24 BRPM | HEIGHT: 64 IN | BODY MASS INDEX: 31.79 KG/M2

## 2021-01-01 VITALS
BODY MASS INDEX: 33.83 KG/M2 | DIASTOLIC BLOOD PRESSURE: 96 MMHG | SYSTOLIC BLOOD PRESSURE: 138 MMHG | WEIGHT: 194 LBS | HEART RATE: 78 BPM

## 2021-01-01 VITALS
HEART RATE: 100 BPM | DIASTOLIC BLOOD PRESSURE: 80 MMHG | SYSTOLIC BLOOD PRESSURE: 140 MMHG | BODY MASS INDEX: 36.07 KG/M2 | WEIGHT: 197.2 LBS

## 2021-01-01 VITALS
HEIGHT: 63 IN | TEMPERATURE: 99.2 F | RESPIRATION RATE: 30 BRPM | DIASTOLIC BLOOD PRESSURE: 51 MMHG | HEART RATE: 143 BPM | WEIGHT: 210.98 LBS | BODY MASS INDEX: 37.38 KG/M2 | OXYGEN SATURATION: 29 % | SYSTOLIC BLOOD PRESSURE: 99 MMHG

## 2021-01-01 VITALS
SYSTOLIC BLOOD PRESSURE: 133 MMHG | OXYGEN SATURATION: 98 % | DIASTOLIC BLOOD PRESSURE: 65 MMHG | TEMPERATURE: 98.6 F | HEIGHT: 64 IN | WEIGHT: 196.44 LBS | BODY MASS INDEX: 33.54 KG/M2 | RESPIRATION RATE: 14 BRPM | HEART RATE: 80 BPM

## 2021-01-01 DIAGNOSIS — R73.9 HYPERGLYCEMIA: ICD-10-CM

## 2021-01-01 DIAGNOSIS — J06.9 ACUTE UPPER RESPIRATORY INFECTION: Primary | ICD-10-CM

## 2021-01-01 DIAGNOSIS — Z11.52 ENCOUNTER FOR SCREENING FOR COVID-19: ICD-10-CM

## 2021-01-01 DIAGNOSIS — R09.02 HYPOXIA: ICD-10-CM

## 2021-01-01 DIAGNOSIS — I10 HTN (HYPERTENSION), BENIGN: Primary | ICD-10-CM

## 2021-01-01 DIAGNOSIS — Z01.818 PRE-OP EXAM: Primary | ICD-10-CM

## 2021-01-01 DIAGNOSIS — N17.9 ACUTE KIDNEY INJURY (HCC): ICD-10-CM

## 2021-01-01 DIAGNOSIS — U07.1 COVID-19 VIRUS INFECTION: ICD-10-CM

## 2021-01-01 DIAGNOSIS — J18.9 PNEUMONIA OF BOTH LOWER LOBES DUE TO INFECTIOUS ORGANISM: ICD-10-CM

## 2021-01-01 DIAGNOSIS — E78.2 MIXED HYPERLIPIDEMIA: ICD-10-CM

## 2021-01-01 DIAGNOSIS — I10 ESSENTIAL HYPERTENSION: ICD-10-CM

## 2021-01-01 DIAGNOSIS — R06.00 DYSPNEA, UNSPECIFIED TYPE: Primary | ICD-10-CM

## 2021-01-01 DIAGNOSIS — E86.0 DEHYDRATION: ICD-10-CM

## 2021-01-01 DIAGNOSIS — R11.2 NON-INTRACTABLE VOMITING WITH NAUSEA, UNSPECIFIED VOMITING TYPE: ICD-10-CM

## 2021-01-01 DIAGNOSIS — R10.13 ABDOMINAL PAIN, EPIGASTRIC: Primary | ICD-10-CM

## 2021-01-01 LAB
(1,3)-BETA-D-GLUCAN (FUNGITELL) INTERPRETATION: POSITIVE
(1,3)-BETA-D-GLUCAN (FUNGITELL) INTERPRETATION: POSITIVE
(1,3)-BETA-D-GLUCAN (FUNGITELL): 193 PG/ML
(1,3)-BETA-D-GLUCAN (FUNGITELL): 276 PG/ML
A/G RATIO: 0.7 (ref 1.1–2.2)
A/G RATIO: 0.8 (ref 1.1–2.2)
A/G RATIO: 0.8 (ref 1.1–2.2)
A/G RATIO: 0.9 (ref 1.1–2.2)
A/G RATIO: 1 (ref 1.1–2.2)
A/G RATIO: 1.1 (ref 1.1–2.2)
A/G RATIO: 1.2 (ref 1.1–2.2)
A/G RATIO: 1.6 (ref 1.1–2.2)
ABO/RH: NORMAL
ALBUMIN SERPL-MCNC: 2.3 G/DL (ref 3.4–5)
ALBUMIN SERPL-MCNC: 2.5 G/DL (ref 3.4–5)
ALBUMIN SERPL-MCNC: 2.9 G/DL (ref 3.4–5)
ALBUMIN SERPL-MCNC: 3.1 G/DL (ref 3.4–5)
ALBUMIN SERPL-MCNC: 3.1 G/DL (ref 3.4–5)
ALBUMIN SERPL-MCNC: 3.2 G/DL (ref 3.4–5)
ALBUMIN SERPL-MCNC: 3.3 G/DL (ref 3.4–5)
ALBUMIN SERPL-MCNC: 3.5 G/DL (ref 3.4–5)
ALBUMIN SERPL-MCNC: 3.6 G/DL (ref 3.4–5)
ALBUMIN SERPL-MCNC: 3.8 G/DL (ref 3.4–5)
ALBUMIN SERPL-MCNC: 3.8 G/DL (ref 3.4–5)
ALBUMIN SERPL-MCNC: 4.1 G/DL (ref 3.4–5)
ALBUMIN SERPL-MCNC: 4.6 G/DL (ref 3.4–5)
ALBUMIN SERPL-MCNC: 4.9 G/DL (ref 3.4–5)
ALP BLD-CCNC: 115 U/L (ref 40–129)
ALP BLD-CCNC: 133 U/L (ref 40–129)
ALP BLD-CCNC: 134 U/L (ref 40–129)
ALP BLD-CCNC: 146 U/L (ref 40–129)
ALP BLD-CCNC: 149 U/L (ref 40–129)
ALP BLD-CCNC: 154 U/L (ref 40–129)
ALP BLD-CCNC: 163 U/L (ref 40–129)
ALP BLD-CCNC: 166 U/L (ref 40–129)
ALP BLD-CCNC: 166 U/L (ref 40–129)
ALP BLD-CCNC: 168 U/L (ref 40–129)
ALP BLD-CCNC: 173 U/L (ref 40–129)
ALP BLD-CCNC: 193 U/L (ref 40–129)
ALP BLD-CCNC: 218 U/L (ref 40–129)
ALP BLD-CCNC: 87 U/L (ref 40–129)
ALP BLD-CCNC: 89 U/L (ref 40–129)
ALP BLD-CCNC: 89 U/L (ref 40–129)
ALP BLD-CCNC: 90 U/L (ref 40–129)
ALP BLD-CCNC: 90 U/L (ref 40–129)
ALP BLD-CCNC: 92 U/L (ref 40–129)
ALP BLD-CCNC: 94 U/L (ref 40–129)
ALP BLD-CCNC: 96 U/L (ref 40–129)
ALT SERPL-CCNC: 12 U/L (ref 10–40)
ALT SERPL-CCNC: 1322 U/L (ref 10–40)
ALT SERPL-CCNC: 18 U/L (ref 10–40)
ALT SERPL-CCNC: 19 U/L (ref 10–40)
ALT SERPL-CCNC: 19 U/L (ref 10–40)
ALT SERPL-CCNC: 20 U/L (ref 10–40)
ALT SERPL-CCNC: 20 U/L (ref 10–40)
ALT SERPL-CCNC: 2151 U/L (ref 10–40)
ALT SERPL-CCNC: 22 U/L (ref 10–40)
ALT SERPL-CCNC: 22 U/L (ref 10–40)
ALT SERPL-CCNC: 28 U/L (ref 10–40)
ALT SERPL-CCNC: 39 U/L (ref 10–40)
ALT SERPL-CCNC: 41 U/L (ref 10–40)
ALT SERPL-CCNC: 50 U/L (ref 10–40)
ALT SERPL-CCNC: 58 U/L (ref 10–40)
ALT SERPL-CCNC: 60 U/L (ref 10–40)
ALT SERPL-CCNC: 61 U/L (ref 10–40)
ALT SERPL-CCNC: 62 U/L (ref 10–40)
ALT SERPL-CCNC: 962 U/L (ref 10–40)
ANION GAP SERPL CALCULATED.3IONS-SCNC: 10 MMOL/L (ref 3–16)
ANION GAP SERPL CALCULATED.3IONS-SCNC: 11 MMOL/L (ref 3–16)
ANION GAP SERPL CALCULATED.3IONS-SCNC: 12 MMOL/L (ref 3–16)
ANION GAP SERPL CALCULATED.3IONS-SCNC: 12 MMOL/L (ref 3–16)
ANION GAP SERPL CALCULATED.3IONS-SCNC: 13 MMOL/L (ref 3–16)
ANION GAP SERPL CALCULATED.3IONS-SCNC: 13 MMOL/L (ref 3–16)
ANION GAP SERPL CALCULATED.3IONS-SCNC: 14 MMOL/L (ref 3–16)
ANION GAP SERPL CALCULATED.3IONS-SCNC: 15 MMOL/L (ref 3–16)
ANION GAP SERPL CALCULATED.3IONS-SCNC: 15 MMOL/L (ref 3–16)
ANION GAP SERPL CALCULATED.3IONS-SCNC: 16 MMOL/L (ref 3–16)
ANION GAP SERPL CALCULATED.3IONS-SCNC: 16 MMOL/L (ref 3–16)
ANION GAP SERPL CALCULATED.3IONS-SCNC: 17 MMOL/L (ref 3–16)
ANION GAP SERPL CALCULATED.3IONS-SCNC: 18 MMOL/L (ref 3–16)
ANION GAP SERPL CALCULATED.3IONS-SCNC: 18 MMOL/L (ref 3–16)
ANION GAP SERPL CALCULATED.3IONS-SCNC: 19 MMOL/L (ref 3–16)
ANION GAP SERPL CALCULATED.3IONS-SCNC: 19 MMOL/L (ref 3–16)
ANION GAP SERPL CALCULATED.3IONS-SCNC: 20 MMOL/L (ref 3–16)
ANION GAP SERPL CALCULATED.3IONS-SCNC: 21 MMOL/L (ref 3–16)
ANISOCYTOSIS: ABNORMAL
ANISOCYTOSIS: ABNORMAL
ANTIBODY SCREEN: NORMAL
ASPERGILLUS ANTIBODY ID: NORMAL
ASPERGILLUS GALACTO AG: NEGATIVE
ASPERGILLUS GALACTO INDEX: 0.17
AST SERPL-CCNC: 16 U/L (ref 15–37)
AST SERPL-CCNC: 1608 U/L (ref 15–37)
AST SERPL-CCNC: 25 U/L (ref 15–37)
AST SERPL-CCNC: 26 U/L (ref 15–37)
AST SERPL-CCNC: 26 U/L (ref 15–37)
AST SERPL-CCNC: 27 U/L (ref 15–37)
AST SERPL-CCNC: 27 U/L (ref 15–37)
AST SERPL-CCNC: 28 U/L (ref 15–37)
AST SERPL-CCNC: 31 U/L (ref 15–37)
AST SERPL-CCNC: 31 U/L (ref 15–37)
AST SERPL-CCNC: 35 U/L (ref 15–37)
AST SERPL-CCNC: 37 U/L (ref 15–37)
AST SERPL-CCNC: 40 U/L (ref 15–37)
AST SERPL-CCNC: 41 U/L (ref 15–37)
AST SERPL-CCNC: 42 U/L (ref 15–37)
AST SERPL-CCNC: 44 U/L (ref 15–37)
AST SERPL-CCNC: 45 U/L (ref 15–37)
AST SERPL-CCNC: 45 U/L (ref 15–37)
AST SERPL-CCNC: 4646 U/L (ref 15–37)
AST SERPL-CCNC: 53 U/L (ref 15–37)
AST SERPL-CCNC: 752 U/L (ref 15–37)
ATYPICAL LYMPHOCYTE RELATIVE PERCENT: 1 % (ref 0–6)
BACTERIA: ABNORMAL /HPF
BACTERIA: ABNORMAL /HPF
BANDED NEUTROPHILS RELATIVE PERCENT: 1 % (ref 0–7)
BANDED NEUTROPHILS RELATIVE PERCENT: 21 % (ref 0–7)
BANDED NEUTROPHILS RELATIVE PERCENT: 4 % (ref 0–7)
BASE EXCESS ARTERIAL: -12 MMOL/L (ref -3–3)
BASE EXCESS ARTERIAL: -2.1 MMOL/L (ref -3–3)
BASE EXCESS ARTERIAL: -3.7 MMOL/L (ref -3–3)
BASE EXCESS ARTERIAL: -6.7 MMOL/L (ref -3–3)
BASE EXCESS ARTERIAL: -8.9 MMOL/L (ref -3–3)
BASE EXCESS ARTERIAL: 0 MMOL/L (ref -3–3)
BASE EXCESS ARTERIAL: 1 MMOL/L (ref -3–3)
BASE EXCESS VENOUS: -3 MMOL/L (ref -3–3)
BASE EXCESS VENOUS: 2.9 MMOL/L (ref -3–3)
BASOPHILS ABSOLUTE: 0 K/UL (ref 0–0.2)
BASOPHILS ABSOLUTE: 0.1 K/UL (ref 0–0.2)
BASOPHILS ABSOLUTE: 0.2 K/UL (ref 0–0.2)
BASOPHILS RELATIVE PERCENT: 0 %
BASOPHILS RELATIVE PERCENT: 0.1 %
BASOPHILS RELATIVE PERCENT: 0.2 %
BASOPHILS RELATIVE PERCENT: 0.3 %
BASOPHILS RELATIVE PERCENT: 0.7 %
BASOPHILS RELATIVE PERCENT: 4.1 %
BETA-HYDROXYBUTYRATE: 0.12 MMOL/L (ref 0–0.27)
BILIRUB SERPL-MCNC: 0.3 MG/DL (ref 0–1)
BILIRUB SERPL-MCNC: 0.4 MG/DL (ref 0–1)
BILIRUB SERPL-MCNC: 0.5 MG/DL (ref 0–1)
BILIRUB SERPL-MCNC: 0.6 MG/DL (ref 0–1)
BILIRUB SERPL-MCNC: 0.7 MG/DL (ref 0–1)
BILIRUB SERPL-MCNC: 0.7 MG/DL (ref 0–1)
BILIRUB SERPL-MCNC: 0.8 MG/DL (ref 0–1)
BILIRUB SERPL-MCNC: 0.8 MG/DL (ref 0–1)
BILIRUB SERPL-MCNC: 1.4 MG/DL (ref 0–1)
BILIRUB SERPL-MCNC: 1.7 MG/DL (ref 0–1)
BILIRUB SERPL-MCNC: 2.8 MG/DL (ref 0–1)
BILIRUBIN URINE: NEGATIVE
BLASTOMYCES ANTIBODY ID: NORMAL
BLOOD BANK DISPENSE STATUS: NORMAL
BLOOD BANK PRODUCT CODE: NORMAL
BLOOD, URINE: ABNORMAL
BLOOD, URINE: NEGATIVE
BLOOD, URINE: NEGATIVE
BPU ID: NORMAL
BUN BLDV-MCNC: 13 MG/DL (ref 7–20)
BUN BLDV-MCNC: 19 MG/DL (ref 7–20)
BUN BLDV-MCNC: 20 MG/DL (ref 7–20)
BUN BLDV-MCNC: 23 MG/DL (ref 7–20)
BUN BLDV-MCNC: 24 MG/DL (ref 7–20)
BUN BLDV-MCNC: 25 MG/DL (ref 7–20)
BUN BLDV-MCNC: 27 MG/DL (ref 7–20)
BUN BLDV-MCNC: 27 MG/DL (ref 7–20)
BUN BLDV-MCNC: 29 MG/DL (ref 7–20)
BUN BLDV-MCNC: 30 MG/DL (ref 7–20)
BUN BLDV-MCNC: 32 MG/DL (ref 7–20)
BUN BLDV-MCNC: 32 MG/DL (ref 7–20)
BUN BLDV-MCNC: 33 MG/DL (ref 7–20)
BUN BLDV-MCNC: 33 MG/DL (ref 7–20)
BUN BLDV-MCNC: 34 MG/DL (ref 7–20)
BUN BLDV-MCNC: 35 MG/DL (ref 7–20)
BUN BLDV-MCNC: 35 MG/DL (ref 7–20)
BUN BLDV-MCNC: 47 MG/DL (ref 7–20)
BUN BLDV-MCNC: 50 MG/DL (ref 7–20)
BUN BLDV-MCNC: 50 MG/DL (ref 7–20)
BUN BLDV-MCNC: 51 MG/DL (ref 7–20)
BUN BLDV-MCNC: 53 MG/DL (ref 7–20)
BUN BLDV-MCNC: 53 MG/DL (ref 7–20)
BUN BLDV-MCNC: 64 MG/DL (ref 7–20)
BUN BLDV-MCNC: 75 MG/DL (ref 7–20)
BURR CELLS: ABNORMAL
CALCIUM SERPL-MCNC: 10 MG/DL (ref 8.3–10.6)
CALCIUM SERPL-MCNC: 7 MG/DL (ref 8.3–10.6)
CALCIUM SERPL-MCNC: 7.8 MG/DL (ref 8.3–10.6)
CALCIUM SERPL-MCNC: 8 MG/DL (ref 8.3–10.6)
CALCIUM SERPL-MCNC: 8.1 MG/DL (ref 8.3–10.6)
CALCIUM SERPL-MCNC: 8.5 MG/DL (ref 8.3–10.6)
CALCIUM SERPL-MCNC: 8.8 MG/DL (ref 8.3–10.6)
CALCIUM SERPL-MCNC: 8.8 MG/DL (ref 8.3–10.6)
CALCIUM SERPL-MCNC: 8.9 MG/DL (ref 8.3–10.6)
CALCIUM SERPL-MCNC: 8.9 MG/DL (ref 8.3–10.6)
CALCIUM SERPL-MCNC: 9 MG/DL (ref 8.3–10.6)
CALCIUM SERPL-MCNC: 9.1 MG/DL (ref 8.3–10.6)
CALCIUM SERPL-MCNC: 9.2 MG/DL (ref 8.3–10.6)
CALCIUM SERPL-MCNC: 9.2 MG/DL (ref 8.3–10.6)
CALCIUM SERPL-MCNC: 9.3 MG/DL (ref 8.3–10.6)
CALCIUM SERPL-MCNC: 9.3 MG/DL (ref 8.3–10.6)
CALCIUM SERPL-MCNC: 9.4 MG/DL (ref 8.3–10.6)
CALCIUM SERPL-MCNC: 9.5 MG/DL (ref 8.3–10.6)
CALCIUM SERPL-MCNC: 9.5 MG/DL (ref 8.3–10.6)
CALCIUM SERPL-MCNC: 9.6 MG/DL (ref 8.3–10.6)
CARBOXYHEMOGLOBIN ARTERIAL: 1.7 % (ref 0–1.5)
CARBOXYHEMOGLOBIN ARTERIAL: 1.8 % (ref 0–1.5)
CARBOXYHEMOGLOBIN ARTERIAL: 2.1 % (ref 0–1.5)
CARBOXYHEMOGLOBIN ARTERIAL: 2.2 % (ref 0–1.5)
CARBOXYHEMOGLOBIN ARTERIAL: 2.3 % (ref 0–1.5)
CARBOXYHEMOGLOBIN ARTERIAL: 2.4 % (ref 0–1.5)
CARBOXYHEMOGLOBIN ARTERIAL: 2.5 % (ref 0–1.5)
CHLORIDE BLD-SCNC: 100 MMOL/L (ref 99–110)
CHLORIDE BLD-SCNC: 101 MMOL/L (ref 99–110)
CHLORIDE BLD-SCNC: 102 MMOL/L (ref 99–110)
CHLORIDE BLD-SCNC: 104 MMOL/L (ref 99–110)
CHLORIDE BLD-SCNC: 105 MMOL/L (ref 99–110)
CHLORIDE BLD-SCNC: 105 MMOL/L (ref 99–110)
CHLORIDE BLD-SCNC: 106 MMOL/L (ref 99–110)
CHLORIDE BLD-SCNC: 108 MMOL/L (ref 99–110)
CHLORIDE BLD-SCNC: 109 MMOL/L (ref 99–110)
CHLORIDE BLD-SCNC: 88 MMOL/L (ref 99–110)
CHLORIDE BLD-SCNC: 89 MMOL/L (ref 99–110)
CHLORIDE BLD-SCNC: 93 MMOL/L (ref 99–110)
CHLORIDE BLD-SCNC: 94 MMOL/L (ref 99–110)
CHLORIDE BLD-SCNC: 96 MMOL/L (ref 99–110)
CHLORIDE BLD-SCNC: 96 MMOL/L (ref 99–110)
CHLORIDE BLD-SCNC: 97 MMOL/L (ref 99–110)
CHLORIDE BLD-SCNC: 98 MMOL/L (ref 99–110)
CHLORIDE BLD-SCNC: 98 MMOL/L (ref 99–110)
CHLORIDE BLD-SCNC: 99 MMOL/L (ref 99–110)
CLARITY: CLEAR
CO2: 15 MMOL/L (ref 21–32)
CO2: 17 MMOL/L (ref 21–32)
CO2: 17 MMOL/L (ref 21–32)
CO2: 18 MMOL/L (ref 21–32)
CO2: 19 MMOL/L (ref 21–32)
CO2: 20 MMOL/L (ref 21–32)
CO2: 21 MMOL/L (ref 21–32)
CO2: 21 MMOL/L (ref 21–32)
CO2: 22 MMOL/L (ref 21–32)
CO2: 23 MMOL/L (ref 21–32)
CO2: 23 MMOL/L (ref 21–32)
CO2: 27 MMOL/L (ref 21–32)
COCCIDIOIDES ANTIBODY ID: NORMAL
COLOR: YELLOW
COMMENT UA: ABNORMAL
CREAT SERPL-MCNC: 0.8 MG/DL (ref 0.6–1.1)
CREAT SERPL-MCNC: 0.8 MG/DL (ref 0.6–1.1)
CREAT SERPL-MCNC: 0.9 MG/DL (ref 0.6–1.1)
CREAT SERPL-MCNC: 1 MG/DL (ref 0.6–1.1)
CREAT SERPL-MCNC: 1.1 MG/DL (ref 0.6–1.1)
CREAT SERPL-MCNC: 1.3 MG/DL (ref 0.6–1.1)
CREAT SERPL-MCNC: 1.5 MG/DL (ref 0.6–1.1)
CREAT SERPL-MCNC: 1.7 MG/DL (ref 0.6–1.1)
CREAT SERPL-MCNC: 1.8 MG/DL (ref 0.6–1.1)
CREAT SERPL-MCNC: 2.4 MG/DL (ref 0.6–1.1)
CREAT SERPL-MCNC: 2.4 MG/DL (ref 0.6–1.1)
CREAT SERPL-MCNC: 3.6 MG/DL (ref 0.6–1.1)
CREAT SERPL-MCNC: 4.1 MG/DL (ref 0.6–1.1)
CRENATED RBC'S: ABNORMAL
CULTURE, RESPIRATORY: ABNORMAL
CULTURE, RESPIRATORY: ABNORMAL
CULTURE, RESPIRATORY: NORMAL
DESCRIPTION BLOOD BANK: NORMAL
DOHLE BODIES: PRESENT
EKG ATRIAL RATE: 102 BPM
EKG ATRIAL RATE: 62 BPM
EKG ATRIAL RATE: 78 BPM
EKG ATRIAL RATE: 88 BPM
EKG DIAGNOSIS: NORMAL
EKG P AXIS: 44 DEGREES
EKG P AXIS: 44 DEGREES
EKG P AXIS: 50 DEGREES
EKG P AXIS: 63 DEGREES
EKG P-R INTERVAL: 132 MS
EKG P-R INTERVAL: 132 MS
EKG P-R INTERVAL: 148 MS
EKG P-R INTERVAL: 160 MS
EKG Q-T INTERVAL: 356 MS
EKG Q-T INTERVAL: 374 MS
EKG Q-T INTERVAL: 376 MS
EKG Q-T INTERVAL: 396 MS
EKG QRS DURATION: 100 MS
EKG QRS DURATION: 100 MS
EKG QRS DURATION: 76 MS
EKG QRS DURATION: 92 MS
EKG QTC CALCULATION (BAZETT): 381 MS
EKG QTC CALCULATION (BAZETT): 451 MS
EKG QTC CALCULATION (BAZETT): 452 MS
EKG QTC CALCULATION (BAZETT): 463 MS
EKG R AXIS: -7 DEGREES
EKG R AXIS: 58 DEGREES
EKG R AXIS: 8 DEGREES
EKG R AXIS: 8 DEGREES
EKG T AXIS: 15 DEGREES
EKG T AXIS: 15 DEGREES
EKG T AXIS: 26 DEGREES
EKG T AXIS: 35 DEGREES
EKG VENTRICULAR RATE: 102 BPM
EKG VENTRICULAR RATE: 62 BPM
EKG VENTRICULAR RATE: 78 BPM
EKG VENTRICULAR RATE: 88 BPM
EOSINOPHILS ABSOLUTE: 0 K/UL (ref 0–0.6)
EOSINOPHILS ABSOLUTE: 0.1 K/UL (ref 0–0.6)
EOSINOPHILS ABSOLUTE: 0.2 K/UL (ref 0–0.6)
EOSINOPHILS ABSOLUTE: 0.3 K/UL (ref 0–0.6)
EOSINOPHILS RELATIVE PERCENT: 0 %
EOSINOPHILS RELATIVE PERCENT: 0.1 %
EOSINOPHILS RELATIVE PERCENT: 0.1 %
EOSINOPHILS RELATIVE PERCENT: 0.2 %
EOSINOPHILS RELATIVE PERCENT: 0.4 %
EOSINOPHILS RELATIVE PERCENT: 0.5 %
EOSINOPHILS RELATIVE PERCENT: 0.5 %
EOSINOPHILS RELATIVE PERCENT: 0.7 %
EOSINOPHILS RELATIVE PERCENT: 0.8 %
EOSINOPHILS RELATIVE PERCENT: 1 %
EOSINOPHILS RELATIVE PERCENT: 1 %
EOSINOPHILS RELATIVE PERCENT: 1.1 %
EOSINOPHILS RELATIVE PERCENT: 2.8 %
EPITHELIAL CELLS, UA: 3 /HPF (ref 0–5)
EPITHELIAL CELLS, UA: ABNORMAL /HPF (ref 0–5)
ESTIMATED AVERAGE GLUCOSE: 197.3 MG/DL
GFR AFRICAN AMERICAN: 13
GFR AFRICAN AMERICAN: 16
GFR AFRICAN AMERICAN: 25
GFR AFRICAN AMERICAN: 25
GFR AFRICAN AMERICAN: 35
GFR AFRICAN AMERICAN: 37
GFR AFRICAN AMERICAN: 43
GFR AFRICAN AMERICAN: 51
GFR AFRICAN AMERICAN: >60
GFR NON-AFRICAN AMERICAN: 11
GFR NON-AFRICAN AMERICAN: 13
GFR NON-AFRICAN AMERICAN: 21
GFR NON-AFRICAN AMERICAN: 21
GFR NON-AFRICAN AMERICAN: 29
GFR NON-AFRICAN AMERICAN: 31
GFR NON-AFRICAN AMERICAN: 36
GFR NON-AFRICAN AMERICAN: 42
GFR NON-AFRICAN AMERICAN: 51
GFR NON-AFRICAN AMERICAN: 57
GFR NON-AFRICAN AMERICAN: >60
GLOBULIN: 3 G/DL
GLOBULIN: 3.1 G/DL
GLOBULIN: 3.3 G/DL
GLOBULIN: 3.4 G/DL
GLOBULIN: 3.5 G/DL
GLOBULIN: 3.6 G/DL
GLOBULIN: 3.7 G/DL
GLOBULIN: 3.8 G/DL
GLOBULIN: 3.9 G/DL
GLOBULIN: 4.1 G/DL
GLOBULIN: 4.2 G/DL
GLUCOSE BLD-MCNC: 100 MG/DL (ref 70–99)
GLUCOSE BLD-MCNC: 101 MG/DL (ref 70–99)
GLUCOSE BLD-MCNC: 104 MG/DL (ref 70–99)
GLUCOSE BLD-MCNC: 109 MG/DL (ref 70–99)
GLUCOSE BLD-MCNC: 112 MG/DL (ref 70–99)
GLUCOSE BLD-MCNC: 114 MG/DL (ref 70–99)
GLUCOSE BLD-MCNC: 118 MG/DL (ref 70–99)
GLUCOSE BLD-MCNC: 118 MG/DL (ref 70–99)
GLUCOSE BLD-MCNC: 121 MG/DL (ref 70–99)
GLUCOSE BLD-MCNC: 123 MG/DL (ref 70–99)
GLUCOSE BLD-MCNC: 127 MG/DL (ref 70–99)
GLUCOSE BLD-MCNC: 128 MG/DL (ref 70–99)
GLUCOSE BLD-MCNC: 128 MG/DL (ref 70–99)
GLUCOSE BLD-MCNC: 129 MG/DL (ref 70–99)
GLUCOSE BLD-MCNC: 130 MG/DL (ref 70–99)
GLUCOSE BLD-MCNC: 131 MG/DL (ref 70–99)
GLUCOSE BLD-MCNC: 132 MG/DL (ref 70–99)
GLUCOSE BLD-MCNC: 134 MG/DL (ref 70–99)
GLUCOSE BLD-MCNC: 135 MG/DL (ref 70–99)
GLUCOSE BLD-MCNC: 136 MG/DL (ref 70–99)
GLUCOSE BLD-MCNC: 138 MG/DL (ref 70–99)
GLUCOSE BLD-MCNC: 140 MG/DL (ref 70–99)
GLUCOSE BLD-MCNC: 141 MG/DL (ref 70–99)
GLUCOSE BLD-MCNC: 142 MG/DL (ref 70–99)
GLUCOSE BLD-MCNC: 144 MG/DL (ref 70–99)
GLUCOSE BLD-MCNC: 144 MG/DL (ref 70–99)
GLUCOSE BLD-MCNC: 145 MG/DL (ref 70–99)
GLUCOSE BLD-MCNC: 145 MG/DL (ref 70–99)
GLUCOSE BLD-MCNC: 148 MG/DL (ref 70–99)
GLUCOSE BLD-MCNC: 151 MG/DL (ref 70–99)
GLUCOSE BLD-MCNC: 152 MG/DL (ref 70–99)
GLUCOSE BLD-MCNC: 152 MG/DL (ref 70–99)
GLUCOSE BLD-MCNC: 156 MG/DL (ref 70–99)
GLUCOSE BLD-MCNC: 163 MG/DL (ref 70–99)
GLUCOSE BLD-MCNC: 163 MG/DL (ref 70–99)
GLUCOSE BLD-MCNC: 164 MG/DL (ref 70–99)
GLUCOSE BLD-MCNC: 164 MG/DL (ref 70–99)
GLUCOSE BLD-MCNC: 166 MG/DL (ref 70–99)
GLUCOSE BLD-MCNC: 167 MG/DL (ref 70–99)
GLUCOSE BLD-MCNC: 168 MG/DL (ref 70–99)
GLUCOSE BLD-MCNC: 171 MG/DL (ref 70–99)
GLUCOSE BLD-MCNC: 172 MG/DL (ref 70–99)
GLUCOSE BLD-MCNC: 174 MG/DL (ref 70–99)
GLUCOSE BLD-MCNC: 177 MG/DL (ref 70–99)
GLUCOSE BLD-MCNC: 179 MG/DL (ref 70–99)
GLUCOSE BLD-MCNC: 181 MG/DL (ref 70–99)
GLUCOSE BLD-MCNC: 181 MG/DL (ref 70–99)
GLUCOSE BLD-MCNC: 184 MG/DL (ref 70–99)
GLUCOSE BLD-MCNC: 185 MG/DL (ref 70–99)
GLUCOSE BLD-MCNC: 187 MG/DL (ref 70–99)
GLUCOSE BLD-MCNC: 188 MG/DL (ref 70–99)
GLUCOSE BLD-MCNC: 188 MG/DL (ref 70–99)
GLUCOSE BLD-MCNC: 189 MG/DL (ref 70–99)
GLUCOSE BLD-MCNC: 197 MG/DL (ref 70–99)
GLUCOSE BLD-MCNC: 197 MG/DL (ref 70–99)
GLUCOSE BLD-MCNC: 200 MG/DL (ref 70–99)
GLUCOSE BLD-MCNC: 201 MG/DL (ref 70–99)
GLUCOSE BLD-MCNC: 212 MG/DL (ref 70–99)
GLUCOSE BLD-MCNC: 218 MG/DL (ref 70–99)
GLUCOSE BLD-MCNC: 219 MG/DL (ref 70–99)
GLUCOSE BLD-MCNC: 225 MG/DL (ref 70–99)
GLUCOSE BLD-MCNC: 227 MG/DL (ref 70–99)
GLUCOSE BLD-MCNC: 229 MG/DL (ref 70–99)
GLUCOSE BLD-MCNC: 231 MG/DL (ref 70–99)
GLUCOSE BLD-MCNC: 235 MG/DL (ref 70–99)
GLUCOSE BLD-MCNC: 236 MG/DL (ref 70–99)
GLUCOSE BLD-MCNC: 239 MG/DL (ref 70–99)
GLUCOSE BLD-MCNC: 250 MG/DL (ref 70–99)
GLUCOSE BLD-MCNC: 257 MG/DL (ref 70–99)
GLUCOSE BLD-MCNC: 260 MG/DL (ref 70–99)
GLUCOSE BLD-MCNC: 263 MG/DL (ref 70–99)
GLUCOSE BLD-MCNC: 264 MG/DL (ref 70–99)
GLUCOSE BLD-MCNC: 266 MG/DL (ref 70–99)
GLUCOSE BLD-MCNC: 266 MG/DL (ref 70–99)
GLUCOSE BLD-MCNC: 270 MG/DL (ref 70–99)
GLUCOSE BLD-MCNC: 274 MG/DL (ref 70–99)
GLUCOSE BLD-MCNC: 275 MG/DL (ref 70–99)
GLUCOSE BLD-MCNC: 280 MG/DL (ref 70–99)
GLUCOSE BLD-MCNC: 280 MG/DL (ref 70–99)
GLUCOSE BLD-MCNC: 282 MG/DL (ref 70–99)
GLUCOSE BLD-MCNC: 293 MG/DL (ref 70–99)
GLUCOSE BLD-MCNC: 313 MG/DL (ref 70–99)
GLUCOSE BLD-MCNC: 316 MG/DL (ref 70–99)
GLUCOSE BLD-MCNC: 320 MG/DL (ref 70–99)
GLUCOSE BLD-MCNC: 324 MG/DL (ref 70–99)
GLUCOSE BLD-MCNC: 333 MG/DL (ref 70–99)
GLUCOSE BLD-MCNC: 334 MG/DL (ref 70–99)
GLUCOSE BLD-MCNC: 334 MG/DL (ref 70–99)
GLUCOSE BLD-MCNC: 336 MG/DL (ref 70–99)
GLUCOSE BLD-MCNC: 343 MG/DL (ref 70–99)
GLUCOSE BLD-MCNC: 350 MG/DL (ref 70–99)
GLUCOSE BLD-MCNC: 351 MG/DL (ref 70–99)
GLUCOSE BLD-MCNC: 355 MG/DL (ref 70–99)
GLUCOSE BLD-MCNC: 368 MG/DL (ref 70–99)
GLUCOSE BLD-MCNC: 371 MG/DL (ref 70–99)
GLUCOSE BLD-MCNC: 375 MG/DL (ref 70–99)
GLUCOSE BLD-MCNC: 375 MG/DL (ref 70–99)
GLUCOSE BLD-MCNC: 376 MG/DL (ref 70–99)
GLUCOSE BLD-MCNC: 377 MG/DL (ref 70–99)
GLUCOSE BLD-MCNC: 380 MG/DL (ref 70–99)
GLUCOSE BLD-MCNC: 383 MG/DL (ref 70–99)
GLUCOSE BLD-MCNC: 395 MG/DL (ref 70–99)
GLUCOSE BLD-MCNC: 404 MG/DL (ref 70–99)
GLUCOSE BLD-MCNC: 405 MG/DL (ref 70–99)
GLUCOSE BLD-MCNC: 413 MG/DL (ref 70–99)
GLUCOSE BLD-MCNC: 416 MG/DL (ref 70–99)
GLUCOSE BLD-MCNC: 423 MG/DL (ref 70–99)
GLUCOSE BLD-MCNC: 427 MG/DL (ref 70–99)
GLUCOSE BLD-MCNC: 43 MG/DL (ref 70–99)
GLUCOSE BLD-MCNC: 436 MG/DL (ref 70–99)
GLUCOSE BLD-MCNC: 439 MG/DL (ref 70–99)
GLUCOSE BLD-MCNC: 439 MG/DL (ref 70–99)
GLUCOSE BLD-MCNC: 44 MG/DL (ref 70–99)
GLUCOSE BLD-MCNC: 440 MG/DL (ref 70–99)
GLUCOSE BLD-MCNC: 448 MG/DL (ref 70–99)
GLUCOSE BLD-MCNC: 459 MG/DL (ref 70–99)
GLUCOSE BLD-MCNC: 462 MG/DL (ref 70–99)
GLUCOSE BLD-MCNC: 466 MG/DL (ref 70–99)
GLUCOSE BLD-MCNC: 47 MG/DL (ref 70–99)
GLUCOSE BLD-MCNC: 47 MG/DL (ref 70–99)
GLUCOSE BLD-MCNC: 472 MG/DL (ref 70–99)
GLUCOSE BLD-MCNC: 482 MG/DL (ref 70–99)
GLUCOSE BLD-MCNC: 488 MG/DL (ref 70–99)
GLUCOSE BLD-MCNC: 515 MG/DL (ref 70–99)
GLUCOSE BLD-MCNC: 516 MG/DL (ref 70–99)
GLUCOSE BLD-MCNC: 518 MG/DL (ref 70–99)
GLUCOSE BLD-MCNC: 52 MG/DL (ref 70–99)
GLUCOSE BLD-MCNC: 521 MG/DL (ref 70–99)
GLUCOSE BLD-MCNC: 547 MG/DL (ref 70–99)
GLUCOSE BLD-MCNC: 556 MG/DL (ref 70–99)
GLUCOSE BLD-MCNC: 558 MG/DL (ref 70–99)
GLUCOSE BLD-MCNC: 564 MG/DL (ref 70–99)
GLUCOSE BLD-MCNC: 571 MG/DL (ref 70–99)
GLUCOSE BLD-MCNC: 572 MG/DL (ref 70–99)
GLUCOSE BLD-MCNC: 58 MG/DL (ref 70–99)
GLUCOSE BLD-MCNC: 59 MG/DL (ref 70–99)
GLUCOSE BLD-MCNC: 69 MG/DL (ref 70–99)
GLUCOSE BLD-MCNC: 74 MG/DL (ref 70–99)
GLUCOSE BLD-MCNC: 75 MG/DL (ref 70–99)
GLUCOSE BLD-MCNC: 91 MG/DL (ref 70–99)
GLUCOSE BLD-MCNC: 95 MG/DL (ref 70–99)
GLUCOSE BLD-MCNC: 95 MG/DL (ref 70–99)
GLUCOSE BLD-MCNC: 96 MG/DL (ref 70–99)
GLUCOSE BLD-MCNC: 97 MG/DL (ref 70–99)
GLUCOSE URINE: 100 MG/DL
GLUCOSE URINE: >=1000 MG/DL
GLUCOSE URINE: NEGATIVE MG/DL
GRAM STAIN RESULT: ABNORMAL
GRAM STAIN RESULT: NORMAL
HBA1C MFR BLD: 8.5 %
HCO3 ARTERIAL: 18 MMOL/L (ref 21–29)
HCO3 ARTERIAL: 18.6 MMOL/L (ref 21–29)
HCO3 ARTERIAL: 19.2 MMOL/L (ref 21–29)
HCO3 ARTERIAL: 22.3 MMOL/L (ref 21–29)
HCO3 ARTERIAL: 23 MMOL/L (ref 21–29)
HCO3 ARTERIAL: 23.7 MMOL/L (ref 21–29)
HCO3 ARTERIAL: 25.7 MMOL/L (ref 21–29)
HCO3 VENOUS: 22.4 MMOL/L (ref 23–29)
HCO3 VENOUS: 28.3 MMOL/L (ref 23–29)
HCT VFR BLD CALC: 20.6 % (ref 36–48)
HCT VFR BLD CALC: 21.3 % (ref 36–48)
HCT VFR BLD CALC: 22 % (ref 36–48)
HCT VFR BLD CALC: 23.3 % (ref 36–48)
HCT VFR BLD CALC: 27.9 % (ref 36–48)
HCT VFR BLD CALC: 31.7 % (ref 36–48)
HCT VFR BLD CALC: 32.5 % (ref 36–48)
HCT VFR BLD CALC: 33.2 % (ref 36–48)
HCT VFR BLD CALC: 33.5 % (ref 36–48)
HCT VFR BLD CALC: 33.9 % (ref 36–48)
HCT VFR BLD CALC: 33.9 % (ref 36–48)
HCT VFR BLD CALC: 34.5 % (ref 36–48)
HCT VFR BLD CALC: 35.1 % (ref 36–48)
HCT VFR BLD CALC: 35.1 % (ref 36–48)
HCT VFR BLD CALC: 35.2 % (ref 36–48)
HCT VFR BLD CALC: 35.2 % (ref 36–48)
HCT VFR BLD CALC: 35.3 % (ref 36–48)
HCT VFR BLD CALC: 35.5 % (ref 36–48)
HCT VFR BLD CALC: 35.9 % (ref 36–48)
HCT VFR BLD CALC: 36.9 % (ref 36–48)
HCT VFR BLD CALC: 38.4 % (ref 36–48)
HCT VFR BLD CALC: 38.5 % (ref 36–48)
HCT VFR BLD CALC: 38.7 % (ref 36–48)
HCT VFR BLD CALC: 39.1 % (ref 36–48)
HEMATOLOGY PATH CONSULT: NORMAL
HEMOGLOBIN, ART, EXTENDED: 10.2 G/DL (ref 12–16)
HEMOGLOBIN, ART, EXTENDED: 10.3 G/DL (ref 12–16)
HEMOGLOBIN, ART, EXTENDED: 10.9 G/DL (ref 12–16)
HEMOGLOBIN, ART, EXTENDED: 16.2 G/DL (ref 12–16)
HEMOGLOBIN, ART, EXTENDED: 5 G/DL (ref 12–16)
HEMOGLOBIN, ART, EXTENDED: 5.5 G/DL (ref 12–16)
HEMOGLOBIN, ART, EXTENDED: 9.3 G/DL (ref 12–16)
HEMOGLOBIN: 10 G/DL (ref 12–16)
HEMOGLOBIN: 10.5 G/DL (ref 12–16)
HEMOGLOBIN: 10.5 G/DL (ref 12–16)
HEMOGLOBIN: 10.6 G/DL (ref 12–16)
HEMOGLOBIN: 10.8 G/DL (ref 12–16)
HEMOGLOBIN: 10.9 G/DL (ref 12–16)
HEMOGLOBIN: 11.2 G/DL (ref 12–16)
HEMOGLOBIN: 11.3 G/DL (ref 12–16)
HEMOGLOBIN: 11.4 G/DL (ref 12–16)
HEMOGLOBIN: 11.6 G/DL (ref 12–16)
HEMOGLOBIN: 11.7 G/DL (ref 12–16)
HEMOGLOBIN: 11.8 G/DL (ref 12–16)
HEMOGLOBIN: 11.9 G/DL (ref 12–16)
HEMOGLOBIN: 12.7 G/DL (ref 12–16)
HEMOGLOBIN: 12.7 G/DL (ref 12–16)
HEMOGLOBIN: 12.9 G/DL (ref 12–16)
HEMOGLOBIN: 13.1 G/DL (ref 12–16)
HEMOGLOBIN: 6.7 G/DL (ref 12–16)
HEMOGLOBIN: 6.9 G/DL (ref 12–16)
HEMOGLOBIN: 7.4 G/DL (ref 12–16)
HEMOGLOBIN: 7.9 G/DL (ref 12–16)
HEMOGLOBIN: 8.9 G/DL (ref 12–16)
HISTOPLASMA ABS, ID: NORMAL
HISTOPLASMA ANTIGEN URINE INTERP: NOT DETECTED
HISTOPLASMA ANTIGEN URINE: NOT DETECTED NG/ML
HYALINE CASTS: 3 /LPF (ref 0–8)
HYALINE CASTS: ABNORMAL /LPF (ref 0–2)
HYPOCHROMIA: ABNORMAL
KETONES, URINE: 15 MG/DL
KETONES, URINE: ABNORMAL MG/DL
KETONES, URINE: NEGATIVE MG/DL
L. PNEUMOPHILA SEROGP 1 UR AG: NORMAL
LACTIC ACID: 1.3 MMOL/L (ref 0.4–2)
LEUKOCYTE ESTERASE, URINE: NEGATIVE
LIPASE: 41 U/L (ref 13–60)
LYMPHOCYTES ABSOLUTE: 0.2 K/UL (ref 1–5.1)
LYMPHOCYTES ABSOLUTE: 0.4 K/UL (ref 1–5.1)
LYMPHOCYTES ABSOLUTE: 0.5 K/UL (ref 1–5.1)
LYMPHOCYTES ABSOLUTE: 0.7 K/UL (ref 1–5.1)
LYMPHOCYTES ABSOLUTE: 0.8 K/UL (ref 1–5.1)
LYMPHOCYTES ABSOLUTE: 0.8 K/UL (ref 1–5.1)
LYMPHOCYTES ABSOLUTE: 0.9 K/UL (ref 1–5.1)
LYMPHOCYTES ABSOLUTE: 0.9 K/UL (ref 1–5.1)
LYMPHOCYTES ABSOLUTE: 1 K/UL (ref 1–5.1)
LYMPHOCYTES ABSOLUTE: 1.1 K/UL (ref 1–5.1)
LYMPHOCYTES ABSOLUTE: 1.1 K/UL (ref 1–5.1)
LYMPHOCYTES ABSOLUTE: 1.2 K/UL (ref 1–5.1)
LYMPHOCYTES ABSOLUTE: 1.3 K/UL (ref 1–5.1)
LYMPHOCYTES ABSOLUTE: 1.4 K/UL (ref 1–5.1)
LYMPHOCYTES ABSOLUTE: 1.5 K/UL (ref 1–5.1)
LYMPHOCYTES ABSOLUTE: 1.5 K/UL (ref 1–5.1)
LYMPHOCYTES ABSOLUTE: 1.6 K/UL (ref 1–5.1)
LYMPHOCYTES ABSOLUTE: 2 K/UL (ref 1–5.1)
LYMPHOCYTES ABSOLUTE: 2.5 K/UL (ref 1–5.1)
LYMPHOCYTES ABSOLUTE: 2.6 K/UL (ref 1–5.1)
LYMPHOCYTES RELATIVE PERCENT: 1.1 %
LYMPHOCYTES RELATIVE PERCENT: 10.3 %
LYMPHOCYTES RELATIVE PERCENT: 15.6 %
LYMPHOCYTES RELATIVE PERCENT: 15.7 %
LYMPHOCYTES RELATIVE PERCENT: 17 %
LYMPHOCYTES RELATIVE PERCENT: 2.5 %
LYMPHOCYTES RELATIVE PERCENT: 25.5 %
LYMPHOCYTES RELATIVE PERCENT: 3 %
LYMPHOCYTES RELATIVE PERCENT: 3 %
LYMPHOCYTES RELATIVE PERCENT: 4.3 %
LYMPHOCYTES RELATIVE PERCENT: 5 %
LYMPHOCYTES RELATIVE PERCENT: 5.3 %
LYMPHOCYTES RELATIVE PERCENT: 5.5 %
LYMPHOCYTES RELATIVE PERCENT: 6.1 %
LYMPHOCYTES RELATIVE PERCENT: 6.3 %
LYMPHOCYTES RELATIVE PERCENT: 6.4 %
LYMPHOCYTES RELATIVE PERCENT: 6.9 %
LYMPHOCYTES RELATIVE PERCENT: 7.1 %
LYMPHOCYTES RELATIVE PERCENT: 7.3 %
LYMPHOCYTES RELATIVE PERCENT: 7.6 %
LYMPHOCYTES RELATIVE PERCENT: 8.1 %
LYMPHOCYTES RELATIVE PERCENT: 8.7 %
MAGNESIUM: 1.6 MG/DL (ref 1.8–2.4)
MAGNESIUM: 1.7 MG/DL (ref 1.8–2.4)
MAGNESIUM: 1.8 MG/DL (ref 1.8–2.4)
MAGNESIUM: 1.9 MG/DL (ref 1.8–2.4)
MAGNESIUM: 2 MG/DL (ref 1.8–2.4)
MAGNESIUM: 2.2 MG/DL (ref 1.8–2.4)
MAGNESIUM: 2.2 MG/DL (ref 1.8–2.4)
MAGNESIUM: 2.4 MG/DL (ref 1.8–2.4)
MAGNESIUM: 2.4 MG/DL (ref 1.8–2.4)
MAGNESIUM: 2.7 MG/DL (ref 1.8–2.4)
MCH RBC QN AUTO: 28.9 PG (ref 26–34)
MCH RBC QN AUTO: 28.9 PG (ref 26–34)
MCH RBC QN AUTO: 29.3 PG (ref 26–34)
MCH RBC QN AUTO: 29.4 PG (ref 26–34)
MCH RBC QN AUTO: 29.5 PG (ref 26–34)
MCH RBC QN AUTO: 29.6 PG (ref 26–34)
MCH RBC QN AUTO: 29.8 PG (ref 26–34)
MCH RBC QN AUTO: 29.9 PG (ref 26–34)
MCH RBC QN AUTO: 30 PG (ref 26–34)
MCH RBC QN AUTO: 30.2 PG (ref 26–34)
MCH RBC QN AUTO: 30.3 PG (ref 26–34)
MCH RBC QN AUTO: 30.5 PG (ref 26–34)
MCH RBC QN AUTO: 30.6 PG (ref 26–34)
MCH RBC QN AUTO: 30.6 PG (ref 26–34)
MCH RBC QN AUTO: 30.8 PG (ref 26–34)
MCH RBC QN AUTO: 30.8 PG (ref 26–34)
MCHC RBC AUTO-ENTMCNC: 30.3 G/DL (ref 31–36)
MCHC RBC AUTO-ENTMCNC: 31.1 G/DL (ref 31–36)
MCHC RBC AUTO-ENTMCNC: 31.3 G/DL (ref 31–36)
MCHC RBC AUTO-ENTMCNC: 31.5 G/DL (ref 31–36)
MCHC RBC AUTO-ENTMCNC: 31.7 G/DL (ref 31–36)
MCHC RBC AUTO-ENTMCNC: 31.9 G/DL (ref 31–36)
MCHC RBC AUTO-ENTMCNC: 32.1 G/DL (ref 31–36)
MCHC RBC AUTO-ENTMCNC: 32.1 G/DL (ref 31–36)
MCHC RBC AUTO-ENTMCNC: 32.2 G/DL (ref 31–36)
MCHC RBC AUTO-ENTMCNC: 33 G/DL (ref 31–36)
MCHC RBC AUTO-ENTMCNC: 33 G/DL (ref 31–36)
MCHC RBC AUTO-ENTMCNC: 33.2 G/DL (ref 31–36)
MCHC RBC AUTO-ENTMCNC: 33.3 G/DL (ref 31–36)
MCHC RBC AUTO-ENTMCNC: 33.3 G/DL (ref 31–36)
MCHC RBC AUTO-ENTMCNC: 33.4 G/DL (ref 31–36)
MCHC RBC AUTO-ENTMCNC: 33.5 G/DL (ref 31–36)
MCHC RBC AUTO-ENTMCNC: 33.6 G/DL (ref 31–36)
MCHC RBC AUTO-ENTMCNC: 33.8 G/DL (ref 31–36)
MCHC RBC AUTO-ENTMCNC: 33.8 G/DL (ref 31–36)
MCHC RBC AUTO-ENTMCNC: 34.1 G/DL (ref 31–36)
MCV RBC AUTO: 89.5 FL (ref 80–100)
MCV RBC AUTO: 90.2 FL (ref 80–100)
MCV RBC AUTO: 90.2 FL (ref 80–100)
MCV RBC AUTO: 90.4 FL (ref 80–100)
MCV RBC AUTO: 90.7 FL (ref 80–100)
MCV RBC AUTO: 90.7 FL (ref 80–100)
MCV RBC AUTO: 90.9 FL (ref 80–100)
MCV RBC AUTO: 91.1 FL (ref 80–100)
MCV RBC AUTO: 91.2 FL (ref 80–100)
MCV RBC AUTO: 91.2 FL (ref 80–100)
MCV RBC AUTO: 91.8 FL (ref 80–100)
MCV RBC AUTO: 91.8 FL (ref 80–100)
MCV RBC AUTO: 92.5 FL (ref 80–100)
MCV RBC AUTO: 92.5 FL (ref 80–100)
MCV RBC AUTO: 92.7 FL (ref 80–100)
MCV RBC AUTO: 92.8 FL (ref 80–100)
MCV RBC AUTO: 93 FL (ref 80–100)
MCV RBC AUTO: 93.4 FL (ref 80–100)
MCV RBC AUTO: 93.8 FL (ref 80–100)
MCV RBC AUTO: 94.4 FL (ref 80–100)
MCV RBC AUTO: 94.5 FL (ref 80–100)
MCV RBC AUTO: 95.5 FL (ref 80–100)
METAMYELOCYTES RELATIVE PERCENT: 4 %
METHEMOGLOBIN ARTERIAL: 0.2 %
METHEMOGLOBIN ARTERIAL: 0.3 %
METHEMOGLOBIN ARTERIAL: 0.4 %
METHEMOGLOBIN ARTERIAL: 0.7 %
METHEMOGLOBIN ARTERIAL: 1.1 %
METHEMOGLOBIN ARTERIAL: 2.3 %
METHEMOGLOBIN ARTERIAL: 4.3 %
MICROSCOPIC EXAMINATION: NORMAL
MICROSCOPIC EXAMINATION: YES
MICROSCOPIC EXAMINATION: YES
MONOCYTES ABSOLUTE: 0.2 K/UL (ref 0–1.3)
MONOCYTES ABSOLUTE: 0.3 K/UL (ref 0–1.3)
MONOCYTES ABSOLUTE: 0.4 K/UL (ref 0–1.3)
MONOCYTES ABSOLUTE: 0.4 K/UL (ref 0–1.3)
MONOCYTES ABSOLUTE: 0.5 K/UL (ref 0–1.3)
MONOCYTES ABSOLUTE: 0.5 K/UL (ref 0–1.3)
MONOCYTES ABSOLUTE: 0.7 K/UL (ref 0–1.3)
MONOCYTES ABSOLUTE: 0.7 K/UL (ref 0–1.3)
MONOCYTES ABSOLUTE: 0.8 K/UL (ref 0–1.3)
MONOCYTES ABSOLUTE: 0.9 K/UL (ref 0–1.3)
MONOCYTES ABSOLUTE: 1.1 K/UL (ref 0–1.3)
MONOCYTES ABSOLUTE: 1.2 K/UL (ref 0–1.3)
MONOCYTES ABSOLUTE: 1.5 K/UL (ref 0–1.3)
MONOCYTES ABSOLUTE: 1.6 K/UL (ref 0–1.3)
MONOCYTES ABSOLUTE: 2.2 K/UL (ref 0–1.3)
MONOCYTES RELATIVE PERCENT: 14 %
MONOCYTES RELATIVE PERCENT: 2 %
MONOCYTES RELATIVE PERCENT: 2.2 %
MONOCYTES RELATIVE PERCENT: 2.8 %
MONOCYTES RELATIVE PERCENT: 3.7 %
MONOCYTES RELATIVE PERCENT: 4.1 %
MONOCYTES RELATIVE PERCENT: 4.2 %
MONOCYTES RELATIVE PERCENT: 4.3 %
MONOCYTES RELATIVE PERCENT: 4.4 %
MONOCYTES RELATIVE PERCENT: 4.9 %
MONOCYTES RELATIVE PERCENT: 5 %
MONOCYTES RELATIVE PERCENT: 5.2 %
MONOCYTES RELATIVE PERCENT: 5.5 %
MONOCYTES RELATIVE PERCENT: 5.6 %
MONOCYTES RELATIVE PERCENT: 5.7 %
MONOCYTES RELATIVE PERCENT: 6 %
MONOCYTES RELATIVE PERCENT: 6.7 %
MONOCYTES RELATIVE PERCENT: 7.2 %
MONOCYTES RELATIVE PERCENT: 7.2 %
MONOCYTES RELATIVE PERCENT: 7.7 %
MONOCYTES RELATIVE PERCENT: 8 %
MONOCYTES RELATIVE PERCENT: 8.8 %
MRSA SCREEN RT-PCR: NORMAL
NEUTROPHILS ABSOLUTE: 11.4 K/UL (ref 1.7–7.7)
NEUTROPHILS ABSOLUTE: 12.6 K/UL (ref 1.7–7.7)
NEUTROPHILS ABSOLUTE: 13.5 K/UL (ref 1.7–7.7)
NEUTROPHILS ABSOLUTE: 14.8 K/UL (ref 1.7–7.7)
NEUTROPHILS ABSOLUTE: 15.4 K/UL (ref 1.7–7.7)
NEUTROPHILS ABSOLUTE: 17 K/UL (ref 1.7–7.7)
NEUTROPHILS ABSOLUTE: 17.4 K/UL (ref 1.7–7.7)
NEUTROPHILS ABSOLUTE: 17.7 K/UL (ref 1.7–7.7)
NEUTROPHILS ABSOLUTE: 18 K/UL (ref 1.7–7.7)
NEUTROPHILS ABSOLUTE: 18.2 K/UL (ref 1.7–7.7)
NEUTROPHILS ABSOLUTE: 18.3 K/UL (ref 1.7–7.7)
NEUTROPHILS ABSOLUTE: 18.8 K/UL (ref 1.7–7.7)
NEUTROPHILS ABSOLUTE: 19.1 K/UL (ref 1.7–7.7)
NEUTROPHILS ABSOLUTE: 21.1 K/UL (ref 1.7–7.7)
NEUTROPHILS ABSOLUTE: 23.5 K/UL (ref 1.7–7.7)
NEUTROPHILS ABSOLUTE: 24.3 K/UL (ref 1.7–7.7)
NEUTROPHILS ABSOLUTE: 26.1 K/UL (ref 1.7–7.7)
NEUTROPHILS ABSOLUTE: 4.2 K/UL (ref 1.7–7.7)
NEUTROPHILS ABSOLUTE: 4.3 K/UL (ref 1.7–7.7)
NEUTROPHILS ABSOLUTE: 4.3 K/UL (ref 1.7–7.7)
NEUTROPHILS ABSOLUTE: 6.3 K/UL (ref 1.7–7.7)
NEUTROPHILS ABSOLUTE: 6.9 K/UL (ref 1.7–7.7)
NEUTROPHILS RELATIVE PERCENT: 63.8 %
NEUTROPHILS RELATIVE PERCENT: 69 %
NEUTROPHILS RELATIVE PERCENT: 73.4 %
NEUTROPHILS RELATIVE PERCENT: 74 %
NEUTROPHILS RELATIVE PERCENT: 75.4 %
NEUTROPHILS RELATIVE PERCENT: 77 %
NEUTROPHILS RELATIVE PERCENT: 83.1 %
NEUTROPHILS RELATIVE PERCENT: 84.3 %
NEUTROPHILS RELATIVE PERCENT: 85.6 %
NEUTROPHILS RELATIVE PERCENT: 86.1 %
NEUTROPHILS RELATIVE PERCENT: 86.6 %
NEUTROPHILS RELATIVE PERCENT: 87.1 %
NEUTROPHILS RELATIVE PERCENT: 87.6 %
NEUTROPHILS RELATIVE PERCENT: 87.7 %
NEUTROPHILS RELATIVE PERCENT: 88 %
NEUTROPHILS RELATIVE PERCENT: 88.1 %
NEUTROPHILS RELATIVE PERCENT: 88.6 %
NEUTROPHILS RELATIVE PERCENT: 88.9 %
NEUTROPHILS RELATIVE PERCENT: 91.8 %
NEUTROPHILS RELATIVE PERCENT: 92.5 %
NEUTROPHILS RELATIVE PERCENT: 94 %
NEUTROPHILS RELATIVE PERCENT: 95 %
NITRITE, URINE: NEGATIVE
NUCLEATED RED BLOOD CELLS: 1 /100 WBC
NUCLEATED RED BLOOD CELLS: 18 /100 WBC
O2 SAT, ARTERIAL: 88.1 %
O2 SAT, ARTERIAL: 89.8 %
O2 SAT, ARTERIAL: 94.1 %
O2 SAT, ARTERIAL: 95 %
O2 SAT, ARTERIAL: 95.7 %
O2 SAT, ARTERIAL: 98 %
O2 SAT, ARTERIAL: 99.9 %
O2 SAT, VEN: 86 %
O2 SAT, VEN: 91 %
O2 THERAPY: ABNORMAL
ORGANISM: ABNORMAL
PCO2 ARTERIAL: 34.6 MMHG (ref 35–45)
PCO2 ARTERIAL: 35.1 MMHG (ref 35–45)
PCO2 ARTERIAL: 39.6 MMHG (ref 35–45)
PCO2 ARTERIAL: 40.2 MMHG (ref 35–45)
PCO2 ARTERIAL: 42 MMHG (ref 35–45)
PCO2 ARTERIAL: 46.1 MMHG (ref 35–45)
PCO2 ARTERIAL: 68.1 MMHG (ref 35–45)
PCO2, VEN: 39.1 MMHG (ref 40–50)
PCO2, VEN: 49.5 MMHG (ref 40–50)
PDW BLD-RTO: 12.1 % (ref 12.4–15.4)
PDW BLD-RTO: 12.1 % (ref 12.4–15.4)
PDW BLD-RTO: 12.2 % (ref 12.4–15.4)
PDW BLD-RTO: 12.2 % (ref 12.4–15.4)
PDW BLD-RTO: 12.3 % (ref 12.4–15.4)
PDW BLD-RTO: 12.4 % (ref 12.4–15.4)
PDW BLD-RTO: 12.4 % (ref 12.4–15.4)
PDW BLD-RTO: 12.7 % (ref 12.4–15.4)
PDW BLD-RTO: 12.8 % (ref 12.4–15.4)
PDW BLD-RTO: 12.9 % (ref 12.4–15.4)
PDW BLD-RTO: 13 % (ref 12.4–15.4)
PDW BLD-RTO: 13.1 % (ref 12.4–15.4)
PDW BLD-RTO: 13.2 % (ref 12.4–15.4)
PDW BLD-RTO: 13.2 % (ref 12.4–15.4)
PDW BLD-RTO: 13.3 % (ref 12.4–15.4)
PDW BLD-RTO: 13.6 % (ref 12.4–15.4)
PDW BLD-RTO: 13.8 % (ref 12.4–15.4)
PDW BLD-RTO: 14.3 % (ref 12.4–15.4)
PERFORMED ON: ABNORMAL
PERFORMED ON: NORMAL
PH ARTERIAL: 7.05 (ref 7.35–7.45)
PH ARTERIAL: 7.24 (ref 7.35–7.45)
PH ARTERIAL: 7.29 (ref 7.35–7.45)
PH ARTERIAL: 7.31 (ref 7.35–7.45)
PH ARTERIAL: 7.41 (ref 7.35–7.45)
PH ARTERIAL: 7.41 (ref 7.35–7.45)
PH ARTERIAL: 7.44 (ref 7.35–7.45)
PH UA: 5.5 (ref 5–8)
PH UA: 5.5 (ref 5–8)
PH UA: 6.5 (ref 5–8)
PH VENOUS: 7.37 (ref 7.35–7.45)
PH VENOUS: 7.37 (ref 7.35–7.45)
PHOSPHORUS: 10.5 MG/DL (ref 2.5–4.9)
PHOSPHORUS: 10.5 MG/DL (ref 2.5–4.9)
PHOSPHORUS: 2.7 MG/DL (ref 2.5–4.9)
PHOSPHORUS: 3.8 MG/DL (ref 2.5–4.9)
PHOSPHORUS: 9.2 MG/DL (ref 2.5–4.9)
PHOSPHORUS: 9.8 MG/DL (ref 2.5–4.9)
PLATELET # BLD: 143 K/UL (ref 135–450)
PLATELET # BLD: 197 K/UL (ref 135–450)
PLATELET # BLD: 239 K/UL (ref 135–450)
PLATELET # BLD: 273 K/UL (ref 135–450)
PLATELET # BLD: 285 K/UL (ref 135–450)
PLATELET # BLD: 306 K/UL (ref 135–450)
PLATELET # BLD: 329 K/UL (ref 135–450)
PLATELET # BLD: 346 K/UL (ref 135–450)
PLATELET # BLD: 379 K/UL (ref 135–450)
PLATELET # BLD: 407 K/UL (ref 135–450)
PLATELET # BLD: 414 K/UL (ref 135–450)
PLATELET # BLD: 415 K/UL (ref 135–450)
PLATELET # BLD: 418 K/UL (ref 135–450)
PLATELET # BLD: 425 K/UL (ref 135–450)
PLATELET # BLD: 443 K/UL (ref 135–450)
PLATELET # BLD: 443 K/UL (ref 135–450)
PLATELET # BLD: 445 K/UL (ref 135–450)
PLATELET # BLD: 458 K/UL (ref 135–450)
PLATELET # BLD: 463 K/UL (ref 135–450)
PLATELET # BLD: 464 K/UL (ref 135–450)
PLATELET # BLD: 482 K/UL (ref 135–450)
PLATELET # BLD: 497 K/UL (ref 135–450)
PLATELET SLIDE REVIEW: ADEQUATE
PMV BLD AUTO: 8.2 FL (ref 5–10.5)
PMV BLD AUTO: 8.3 FL (ref 5–10.5)
PMV BLD AUTO: 8.3 FL (ref 5–10.5)
PMV BLD AUTO: 8.5 FL (ref 5–10.5)
PMV BLD AUTO: 8.6 FL (ref 5–10.5)
PMV BLD AUTO: 8.7 FL (ref 5–10.5)
PMV BLD AUTO: 8.7 FL (ref 5–10.5)
PMV BLD AUTO: 8.8 FL (ref 5–10.5)
PMV BLD AUTO: 9 FL (ref 5–10.5)
PMV BLD AUTO: 9.1 FL (ref 5–10.5)
PMV BLD AUTO: 9.4 FL (ref 5–10.5)
PMV BLD AUTO: 9.4 FL (ref 5–10.5)
PMV BLD AUTO: 9.9 FL (ref 5–10.5)
PO2 ARTERIAL: 110 MMHG (ref 75–108)
PO2 ARTERIAL: 112 MMHG (ref 75–108)
PO2 ARTERIAL: 153 MMHG (ref 75–108)
PO2 ARTERIAL: 53.5 MMHG (ref 75–108)
PO2 ARTERIAL: 59.4 MMHG (ref 75–108)
PO2 ARTERIAL: 71.2 MMHG (ref 75–108)
PO2 ARTERIAL: 82.2 MMHG (ref 75–108)
PO2, VEN: 52.3 MMHG (ref 25–40)
PO2, VEN: 63.1 MMHG (ref 25–40)
POC SAMPLE TYPE: ABNORMAL
POLYCHROMASIA: ABNORMAL
POTASSIUM REFLEX MAGNESIUM: 4.2 MMOL/L (ref 3.5–5.1)
POTASSIUM REFLEX MAGNESIUM: 4.6 MMOL/L (ref 3.5–5.1)
POTASSIUM SERPL-SCNC: 3.2 MMOL/L (ref 3.5–5.1)
POTASSIUM SERPL-SCNC: 3.5 MMOL/L (ref 3.5–5.1)
POTASSIUM SERPL-SCNC: 3.8 MMOL/L (ref 3.5–5.1)
POTASSIUM SERPL-SCNC: 3.9 MMOL/L (ref 3.5–5.1)
POTASSIUM SERPL-SCNC: 4 MMOL/L (ref 3.5–5.1)
POTASSIUM SERPL-SCNC: 4 MMOL/L (ref 3.5–5.1)
POTASSIUM SERPL-SCNC: 4.1 MMOL/L (ref 3.5–5.1)
POTASSIUM SERPL-SCNC: 4.2 MMOL/L (ref 3.5–5.1)
POTASSIUM SERPL-SCNC: 4.3 MMOL/L (ref 3.5–5.1)
POTASSIUM SERPL-SCNC: 4.3 MMOL/L (ref 3.5–5.1)
POTASSIUM SERPL-SCNC: 4.4 MMOL/L (ref 3.5–5.1)
POTASSIUM SERPL-SCNC: 4.5 MMOL/L (ref 3.5–5.1)
POTASSIUM SERPL-SCNC: 4.6 MMOL/L (ref 3.5–5.1)
POTASSIUM SERPL-SCNC: 4.7 MMOL/L (ref 3.5–5.1)
POTASSIUM SERPL-SCNC: 4.7 MMOL/L (ref 3.5–5.1)
POTASSIUM SERPL-SCNC: 4.9 MMOL/L (ref 3.5–5.1)
POTASSIUM SERPL-SCNC: 4.9 MMOL/L (ref 3.5–5.1)
POTASSIUM SERPL-SCNC: 5 MMOL/L (ref 3.5–5.1)
POTASSIUM SERPL-SCNC: 5.1 MMOL/L (ref 3.5–5.1)
POTASSIUM SERPL-SCNC: 6.3 MMOL/L (ref 3.5–5.1)
PROCALCITONIN: 0.05 NG/ML (ref 0–0.15)
PROCALCITONIN: 0.08 NG/ML (ref 0–0.15)
PROCALCITONIN: 0.13 NG/ML (ref 0–0.15)
PROCALCITONIN: 21.46 NG/ML (ref 0–0.15)
PROCALCITONIN: 4.83 NG/ML (ref 0–0.15)
PROTEIN UA: 100 MG/DL
PROTEIN UA: 100 MG/DL
PROTEIN UA: NEGATIVE MG/DL
RBC # BLD: 2.32 M/UL (ref 4–5.2)
RBC # BLD: 2.57 M/UL (ref 4–5.2)
RBC # BLD: 2.95 M/UL (ref 4–5.2)
RBC # BLD: 3.36 M/UL (ref 4–5.2)
RBC # BLD: 3.57 M/UL (ref 4–5.2)
RBC # BLD: 3.61 M/UL (ref 4–5.2)
RBC # BLD: 3.64 M/UL (ref 4–5.2)
RBC # BLD: 3.66 M/UL (ref 4–5.2)
RBC # BLD: 3.68 M/UL (ref 4–5.2)
RBC # BLD: 3.74 M/UL (ref 4–5.2)
RBC # BLD: 3.79 M/UL (ref 4–5.2)
RBC # BLD: 3.82 M/UL (ref 4–5.2)
RBC # BLD: 3.85 M/UL (ref 4–5.2)
RBC # BLD: 3.86 M/UL (ref 4–5.2)
RBC # BLD: 3.86 M/UL (ref 4–5.2)
RBC # BLD: 3.87 M/UL (ref 4–5.2)
RBC # BLD: 3.88 M/UL (ref 4–5.2)
RBC # BLD: 3.96 M/UL (ref 4–5.2)
RBC # BLD: 4.16 M/UL (ref 4–5.2)
RBC # BLD: 4.21 M/UL (ref 4–5.2)
RBC # BLD: 4.21 M/UL (ref 4–5.2)
RBC # BLD: 4.34 M/UL (ref 4–5.2)
RBC # BLD: NORMAL 10*6/UL
RBC UA: 2 /HPF (ref 0–4)
RBC UA: ABNORMAL /HPF (ref 0–4)
SARS-COV-2: DETECTED
SARS-COV-2: DETECTED
SARS-COV-2: NOT DETECTED
SCHISTOCYTES: ABNORMAL
SLIDE REVIEW: ABNORMAL
SODIUM BLD-SCNC: 125 MMOL/L (ref 136–145)
SODIUM BLD-SCNC: 130 MMOL/L (ref 136–145)
SODIUM BLD-SCNC: 131 MMOL/L (ref 136–145)
SODIUM BLD-SCNC: 132 MMOL/L (ref 136–145)
SODIUM BLD-SCNC: 132 MMOL/L (ref 136–145)
SODIUM BLD-SCNC: 133 MMOL/L (ref 136–145)
SODIUM BLD-SCNC: 135 MMOL/L (ref 136–145)
SODIUM BLD-SCNC: 136 MMOL/L (ref 136–145)
SODIUM BLD-SCNC: 137 MMOL/L (ref 136–145)
SODIUM BLD-SCNC: 138 MMOL/L (ref 136–145)
SODIUM BLD-SCNC: 138 MMOL/L (ref 136–145)
SODIUM BLD-SCNC: 139 MMOL/L (ref 136–145)
SODIUM BLD-SCNC: 140 MMOL/L (ref 136–145)
SODIUM BLD-SCNC: 141 MMOL/L (ref 136–145)
SODIUM BLD-SCNC: 141 MMOL/L (ref 136–145)
SODIUM BLD-SCNC: 142 MMOL/L (ref 136–145)
SODIUM BLD-SCNC: 144 MMOL/L (ref 136–145)
SPECIFIC GRAVITY UA: 1.01 (ref 1–1.03)
SPECIFIC GRAVITY UA: 1.02 (ref 1–1.03)
SPECIFIC GRAVITY UA: >1.03 (ref 1–1.03)
STREP PNEUMONIAE ANTIGEN, URINE: NORMAL
TCO2 ARTERIAL: 19.3 MMOL/L
TCO2 ARTERIAL: 20.4 MMOL/L
TCO2 ARTERIAL: 20.7 MMOL/L
TCO2 ARTERIAL: 23.4 MMOL/L
TCO2 ARTERIAL: 24.4 MMOL/L
TCO2 ARTERIAL: 24.8 MMOL/L
TCO2 ARTERIAL: 26.9 MMOL/L
TCO2 CALC VENOUS: 23 MMOL/L
TCO2 CALC VENOUS: 29 MMOL/L
TEAR DROP CELLS: ABNORMAL
TOTAL PROTEIN: 5.6 G/DL (ref 6.4–8.2)
TOTAL PROTEIN: 5.6 G/DL (ref 6.4–8.2)
TOTAL PROTEIN: 6.3 G/DL (ref 6.4–8.2)
TOTAL PROTEIN: 6.5 G/DL (ref 6.4–8.2)
TOTAL PROTEIN: 6.6 G/DL (ref 6.4–8.2)
TOTAL PROTEIN: 6.6 G/DL (ref 6.4–8.2)
TOTAL PROTEIN: 6.7 G/DL (ref 6.4–8.2)
TOTAL PROTEIN: 6.8 G/DL (ref 6.4–8.2)
TOTAL PROTEIN: 6.9 G/DL (ref 6.4–8.2)
TOTAL PROTEIN: 7 G/DL (ref 6.4–8.2)
TOTAL PROTEIN: 7.2 G/DL (ref 6.4–8.2)
TOTAL PROTEIN: 7.6 G/DL (ref 6.4–8.2)
TOTAL PROTEIN: 7.7 G/DL (ref 6.4–8.2)
TOTAL PROTEIN: 7.7 G/DL (ref 6.4–8.2)
TOTAL PROTEIN: 7.9 G/DL (ref 6.4–8.2)
TOTAL PROTEIN: 8.3 G/DL (ref 6.4–8.2)
TOTAL PROTEIN: 8.5 G/DL (ref 6.4–8.2)
TROPONIN: <0.01 NG/ML
TROPONIN: <0.01 NG/ML
URINE REFLEX TO CULTURE: ABNORMAL
URINE REFLEX TO CULTURE: NORMAL
URINE TYPE: ABNORMAL
URINE TYPE: ABNORMAL
URINE TYPE: NORMAL
UROBILINOGEN, URINE: 0.2 E.U./DL
VACUOLATED NEUTROPHILS: PRESENT
WBC # BLD: 12.1 K/UL (ref 4–11)
WBC # BLD: 14.4 K/UL (ref 4–11)
WBC # BLD: 15.6 K/UL (ref 4–11)
WBC # BLD: 16.9 K/UL (ref 4–11)
WBC # BLD: 17.5 K/UL (ref 4–11)
WBC # BLD: 19.3 K/UL (ref 4–11)
WBC # BLD: 19.6 K/UL (ref 4–11)
WBC # BLD: 19.9 K/UL (ref 4–11)
WBC # BLD: 20.2 K/UL (ref 4–11)
WBC # BLD: 21 K/UL (ref 4–11)
WBC # BLD: 21.1 K/UL (ref 4–11)
WBC # BLD: 21.2 K/UL (ref 4–11)
WBC # BLD: 21.7 K/UL (ref 4–11)
WBC # BLD: 25.1 K/UL (ref 4–11)
WBC # BLD: 25.4 K/UL (ref 4–11)
WBC # BLD: 27.6 K/UL (ref 4–11)
WBC # BLD: 28.4 K/UL (ref 4–11)
WBC # BLD: 5.7 K/UL (ref 4–11)
WBC # BLD: 5.7 K/UL (ref 4–11)
WBC # BLD: 5.8 K/UL (ref 4–11)
WBC # BLD: 8.3 K/UL (ref 4–11)
WBC # BLD: 9.9 K/UL (ref 4–11)
WBC UA: 1 /HPF (ref 0–5)
WBC UA: ABNORMAL /HPF (ref 0–5)

## 2021-01-01 PROCEDURE — 83735 ASSAY OF MAGNESIUM: CPT

## 2021-01-01 PROCEDURE — 6370000000 HC RX 637 (ALT 250 FOR IP): Performed by: INTERNAL MEDICINE

## 2021-01-01 PROCEDURE — 71045 X-RAY EXAM CHEST 1 VIEW: CPT

## 2021-01-01 PROCEDURE — 2060000000 HC ICU INTERMEDIATE R&B

## 2021-01-01 PROCEDURE — 6370000000 HC RX 637 (ALT 250 FOR IP): Performed by: STUDENT IN AN ORGANIZED HEALTH CARE EDUCATION/TRAINING PROGRAM

## 2021-01-01 PROCEDURE — 94003 VENT MGMT INPAT SUBQ DAY: CPT

## 2021-01-01 PROCEDURE — 93010 ELECTROCARDIOGRAM REPORT: CPT | Performed by: INTERNAL MEDICINE

## 2021-01-01 PROCEDURE — 84145 PROCALCITONIN (PCT): CPT

## 2021-01-01 PROCEDURE — 2580000003 HC RX 258: Performed by: STUDENT IN AN ORGANIZED HEALTH CARE EDUCATION/TRAINING PROGRAM

## 2021-01-01 PROCEDURE — 6360000002 HC RX W HCPCS: Performed by: INTERNAL MEDICINE

## 2021-01-01 PROCEDURE — 6360000002 HC RX W HCPCS: Performed by: STUDENT IN AN ORGANIZED HEALTH CARE EDUCATION/TRAINING PROGRAM

## 2021-01-01 PROCEDURE — 85025 COMPLETE CBC W/AUTO DIFF WBC: CPT

## 2021-01-01 PROCEDURE — 02HV33Z INSERTION OF INFUSION DEVICE INTO SUPERIOR VENA CAVA, PERCUTANEOUS APPROACH: ICD-10-PCS | Performed by: INTERNAL MEDICINE

## 2021-01-01 PROCEDURE — 71046 X-RAY EXAM CHEST 2 VIEWS: CPT

## 2021-01-01 PROCEDURE — 36569 INSJ PICC 5 YR+ W/O IMAGING: CPT

## 2021-01-01 PROCEDURE — 80053 COMPREHEN METABOLIC PANEL: CPT

## 2021-01-01 PROCEDURE — 36415 COLL VENOUS BLD VENIPUNCTURE: CPT

## 2021-01-01 PROCEDURE — 36430 TRANSFUSION BLD/BLD COMPNT: CPT

## 2021-01-01 PROCEDURE — 94761 N-INVAS EAR/PLS OXIMETRY MLT: CPT

## 2021-01-01 PROCEDURE — 99232 SBSQ HOSP IP/OBS MODERATE 35: CPT | Performed by: INTERNAL MEDICINE

## 2021-01-01 PROCEDURE — 82803 BLOOD GASES ANY COMBINATION: CPT

## 2021-01-01 PROCEDURE — 81003 URINALYSIS AUTO W/O SCOPE: CPT

## 2021-01-01 PROCEDURE — P9016 RBC LEUKOCYTES REDUCED: HCPCS

## 2021-01-01 PROCEDURE — 2580000003 HC RX 258: Performed by: INTERNAL MEDICINE

## 2021-01-01 PROCEDURE — 93000 ELECTROCARDIOGRAM COMPLETE: CPT | Performed by: INTERNAL MEDICINE

## 2021-01-01 PROCEDURE — 6370000000 HC RX 637 (ALT 250 FOR IP): Performed by: NURSE PRACTITIONER

## 2021-01-01 PROCEDURE — 2000000000 HC ICU R&B

## 2021-01-01 PROCEDURE — U0005 INFEC AGEN DETEC AMPLI PROBE: HCPCS

## 2021-01-01 PROCEDURE — 82947 ASSAY GLUCOSE BLOOD QUANT: CPT

## 2021-01-01 PROCEDURE — 86901 BLOOD TYPING SEROLOGIC RH(D): CPT

## 2021-01-01 PROCEDURE — 31500 INSERT EMERGENCY AIRWAY: CPT | Performed by: INTERNAL MEDICINE

## 2021-01-01 PROCEDURE — 86698 HISTOPLASMA ANTIBODY: CPT

## 2021-01-01 PROCEDURE — 87040 BLOOD CULTURE FOR BACTERIA: CPT

## 2021-01-01 PROCEDURE — 5A1945Z RESPIRATORY VENTILATION, 24-96 CONSECUTIVE HOURS: ICD-10-PCS | Performed by: INTERNAL MEDICINE

## 2021-01-01 PROCEDURE — 86606 ASPERGILLUS ANTIBODY: CPT

## 2021-01-01 PROCEDURE — 2700000000 HC OXYGEN THERAPY PER DAY

## 2021-01-01 PROCEDURE — 2580000003 HC RX 258: Performed by: NURSE PRACTITIONER

## 2021-01-01 PROCEDURE — G8427 DOCREV CUR MEDS BY ELIG CLIN: HCPCS | Performed by: INTERNAL MEDICINE

## 2021-01-01 PROCEDURE — 86635 COCCIDIOIDES ANTIBODY: CPT

## 2021-01-01 PROCEDURE — 84100 ASSAY OF PHOSPHORUS: CPT

## 2021-01-01 PROCEDURE — 89220 SPUTUM SPECIMEN COLLECTION: CPT

## 2021-01-01 PROCEDURE — 2500000003 HC RX 250 WO HCPCS: Performed by: NURSE PRACTITIONER

## 2021-01-01 PROCEDURE — 94640 AIRWAY INHALATION TREATMENT: CPT

## 2021-01-01 PROCEDURE — 76937 US GUIDE VASCULAR ACCESS: CPT

## 2021-01-01 PROCEDURE — 6360000002 HC RX W HCPCS: Performed by: NURSE PRACTITIONER

## 2021-01-01 PROCEDURE — 87449 NOS EACH ORGANISM AG IA: CPT

## 2021-01-01 PROCEDURE — 87070 CULTURE OTHR SPECIMN AEROBIC: CPT

## 2021-01-01 PROCEDURE — 99291 CRITICAL CARE FIRST HOUR: CPT | Performed by: INTERNAL MEDICINE

## 2021-01-01 PROCEDURE — 82010 KETONE BODYS QUAN: CPT

## 2021-01-01 PROCEDURE — 99233 SBSQ HOSP IP/OBS HIGH 50: CPT | Performed by: INTERNAL MEDICINE

## 2021-01-01 PROCEDURE — 6370000000 HC RX 637 (ALT 250 FOR IP): Performed by: EMERGENCY MEDICINE

## 2021-01-01 PROCEDURE — 2500000003 HC RX 250 WO HCPCS: Performed by: INTERNAL MEDICINE

## 2021-01-01 PROCEDURE — 87305 ASPERGILLUS AG IA: CPT

## 2021-01-01 PROCEDURE — 36556 INSERT NON-TUNNEL CV CATH: CPT | Performed by: INTERNAL MEDICINE

## 2021-01-01 PROCEDURE — 83036 HEMOGLOBIN GLYCOSYLATED A1C: CPT

## 2021-01-01 PROCEDURE — 96372 THER/PROPH/DIAG INJ SC/IM: CPT

## 2021-01-01 PROCEDURE — G8484 FLU IMMUNIZE NO ADMIN: HCPCS | Performed by: INTERNAL MEDICINE

## 2021-01-01 PROCEDURE — 6360000002 HC RX W HCPCS: Performed by: EMERGENCY MEDICINE

## 2021-01-01 PROCEDURE — 93005 ELECTROCARDIOGRAM TRACING: CPT | Performed by: EMERGENCY MEDICINE

## 2021-01-01 PROCEDURE — 1036F TOBACCO NON-USER: CPT | Performed by: INTERNAL MEDICINE

## 2021-01-01 PROCEDURE — 6360000002 HC RX W HCPCS: Performed by: PHYSICIAN ASSISTANT

## 2021-01-01 PROCEDURE — U0003 INFECTIOUS AGENT DETECTION BY NUCLEIC ACID (DNA OR RNA); SEVERE ACUTE RESPIRATORY SYNDROME CORONAVIRUS 2 (SARS-COV-2) (CORONAVIRUS DISEASE [COVID-19]), AMPLIFIED PROBE TECHNIQUE, MAKING USE OF HIGH THROUGHPUT TECHNOLOGIES AS DESCRIBED BY CMS-2020-01-R: HCPCS

## 2021-01-01 PROCEDURE — 2500000003 HC RX 250 WO HCPCS: Performed by: EMERGENCY MEDICINE

## 2021-01-01 PROCEDURE — 81001 URINALYSIS AUTO W/SCOPE: CPT

## 2021-01-01 PROCEDURE — 2500000003 HC RX 250 WO HCPCS: Performed by: STUDENT IN AN ORGANIZED HEALTH CARE EDUCATION/TRAINING PROGRAM

## 2021-01-01 PROCEDURE — 87641 MR-STAPH DNA AMP PROBE: CPT

## 2021-01-01 PROCEDURE — 80048 BASIC METABOLIC PNL TOTAL CA: CPT

## 2021-01-01 PROCEDURE — 86612 BLASTOMYCES ANTIBODY: CPT

## 2021-01-01 PROCEDURE — G8417 CALC BMI ABV UP PARAM F/U: HCPCS | Performed by: INTERNAL MEDICINE

## 2021-01-01 PROCEDURE — APPNB15 APP NON BILLABLE TIME 0-15 MINS: Performed by: NURSE PRACTITIONER

## 2021-01-01 PROCEDURE — 99284 EMERGENCY DEPT VISIT MOD MDM: CPT

## 2021-01-01 PROCEDURE — 87205 SMEAR GRAM STAIN: CPT

## 2021-01-01 PROCEDURE — XW033E5 INTRODUCTION OF REMDESIVIR ANTI-INFECTIVE INTO PERIPHERAL VEIN, PERCUTANEOUS APPROACH, NEW TECHNOLOGY GROUP 5: ICD-10-PCS | Performed by: INTERNAL MEDICINE

## 2021-01-01 PROCEDURE — 6360000002 HC RX W HCPCS: Performed by: FAMILY MEDICINE

## 2021-01-01 PROCEDURE — 87186 SC STD MICRODIL/AGAR DIL: CPT

## 2021-01-01 PROCEDURE — 87077 CULTURE AEROBIC IDENTIFY: CPT

## 2021-01-01 PROCEDURE — 3017F COLORECTAL CA SCREEN DOC REV: CPT | Performed by: INTERNAL MEDICINE

## 2021-01-01 PROCEDURE — 93005 ELECTROCARDIOGRAM TRACING: CPT | Performed by: PHYSICIAN ASSISTANT

## 2021-01-01 PROCEDURE — 96374 THER/PROPH/DIAG INJ IV PUSH: CPT

## 2021-01-01 PROCEDURE — 85018 HEMOGLOBIN: CPT

## 2021-01-01 PROCEDURE — 83605 ASSAY OF LACTIC ACID: CPT

## 2021-01-01 PROCEDURE — 36600 WITHDRAWAL OF ARTERIAL BLOOD: CPT

## 2021-01-01 PROCEDURE — 87385 HISTOPLASMA CAPSUL AG IA: CPT

## 2021-01-01 PROCEDURE — 0BH17EZ INSERTION OF ENDOTRACHEAL AIRWAY INTO TRACHEA, VIA NATURAL OR ARTIFICIAL OPENING: ICD-10-PCS | Performed by: INTERNAL MEDICINE

## 2021-01-01 PROCEDURE — 94002 VENT MGMT INPAT INIT DAY: CPT

## 2021-01-01 PROCEDURE — 37799 UNLISTED PX VASCULAR SURGERY: CPT

## 2021-01-01 PROCEDURE — 86850 RBC ANTIBODY SCREEN: CPT

## 2021-01-01 PROCEDURE — 99214 OFFICE O/P EST MOD 30 MIN: CPT | Performed by: INTERNAL MEDICINE

## 2021-01-01 PROCEDURE — 96375 TX/PRO/DX INJ NEW DRUG ADDON: CPT

## 2021-01-01 PROCEDURE — 86923 COMPATIBILITY TEST ELECTRIC: CPT

## 2021-01-01 PROCEDURE — 86900 BLOOD TYPING SEROLOGIC ABO: CPT

## 2021-01-01 PROCEDURE — 83690 ASSAY OF LIPASE: CPT

## 2021-01-01 PROCEDURE — C1751 CATH, INF, PER/CENT/MIDLINE: HCPCS

## 2021-01-01 PROCEDURE — 94660 CPAP INITIATION&MGMT: CPT

## 2021-01-01 PROCEDURE — 85014 HEMATOCRIT: CPT

## 2021-01-01 PROCEDURE — 84484 ASSAY OF TROPONIN QUANT: CPT

## 2021-01-01 PROCEDURE — 99285 EMERGENCY DEPT VISIT HI MDM: CPT

## 2021-01-01 PROCEDURE — 99255 IP/OBS CONSLTJ NEW/EST HI 80: CPT | Performed by: INTERNAL MEDICINE

## 2021-01-01 PROCEDURE — 74176 CT ABD & PELVIS W/O CONTRAST: CPT

## 2021-01-01 RX ORDER — SODIUM CHLORIDE 9 MG/ML
25 INJECTION, SOLUTION INTRAVENOUS PRN
Status: DISCONTINUED | OUTPATIENT
Start: 2021-01-01 | End: 2021-01-01

## 2021-01-01 RX ORDER — DIPHENHYDRAMINE HYDROCHLORIDE 50 MG/ML
25 INJECTION INTRAMUSCULAR; INTRAVENOUS ONCE
Status: COMPLETED | OUTPATIENT
Start: 2021-01-01 | End: 2021-01-01

## 2021-01-01 RX ORDER — EPINEPHRINE 0.1 MG/ML
SYRINGE (ML) INJECTION
Status: DISCONTINUED
Start: 2021-01-01 | End: 2021-01-01 | Stop reason: HOSPADM

## 2021-01-01 RX ORDER — ALBUTEROL SULFATE 90 UG/1
2 AEROSOL, METERED RESPIRATORY (INHALATION) EVERY 4 HOURS PRN
Status: DISCONTINUED | OUTPATIENT
Start: 2021-01-01 | End: 2021-01-01 | Stop reason: HOSPADM

## 2021-01-01 RX ORDER — INSULIN GLARGINE 100 [IU]/ML
45 INJECTION, SOLUTION SUBCUTANEOUS 2 TIMES DAILY
Status: DISCONTINUED | OUTPATIENT
Start: 2021-01-01 | End: 2021-01-01

## 2021-01-01 RX ORDER — ATORVASTATIN CALCIUM 20 MG/1
20 TABLET, FILM COATED ORAL DAILY
Status: DISCONTINUED | OUTPATIENT
Start: 2021-01-01 | End: 2021-01-01 | Stop reason: DRUGHIGH

## 2021-01-01 RX ORDER — PREGABALIN 100 MG/1
200 CAPSULE ORAL 2 TIMES DAILY
Status: DISCONTINUED | OUTPATIENT
Start: 2021-01-01 | End: 2021-01-01 | Stop reason: HOSPADM

## 2021-01-01 RX ORDER — MINERAL OIL AND WHITE PETROLATUM 150; 830 MG/G; MG/G
OINTMENT OPHTHALMIC
Status: DISCONTINUED | OUTPATIENT
Start: 2021-01-01 | End: 2021-01-01 | Stop reason: HOSPADM

## 2021-01-01 RX ORDER — LOSARTAN POTASSIUM 100 MG/1
100 TABLET ORAL DAILY
Status: DISCONTINUED | OUTPATIENT
Start: 2021-01-01 | End: 2021-01-01

## 2021-01-01 RX ORDER — DEXAMETHASONE SODIUM PHOSPHATE 4 MG/ML
6 INJECTION, SOLUTION INTRA-ARTICULAR; INTRALESIONAL; INTRAMUSCULAR; INTRAVENOUS; SOFT TISSUE EVERY 24 HOURS
Status: DISCONTINUED | OUTPATIENT
Start: 2021-01-01 | End: 2021-01-01 | Stop reason: HOSPADM

## 2021-01-01 RX ORDER — ONDANSETRON 2 MG/ML
4 INJECTION INTRAMUSCULAR; INTRAVENOUS EVERY 6 HOURS PRN
Status: DISCONTINUED | OUTPATIENT
Start: 2021-01-01 | End: 2021-01-01 | Stop reason: HOSPADM

## 2021-01-01 RX ORDER — 0.9 % SODIUM CHLORIDE 0.9 %
1000 INTRAVENOUS SOLUTION INTRAVENOUS ONCE
Status: DISCONTINUED | OUTPATIENT
Start: 2021-01-01 | End: 2021-01-01 | Stop reason: HOSPADM

## 2021-01-01 RX ORDER — ONDANSETRON 2 MG/ML
4 INJECTION INTRAMUSCULAR; INTRAVENOUS ONCE
Status: COMPLETED | OUTPATIENT
Start: 2021-01-01 | End: 2021-01-01

## 2021-01-01 RX ORDER — CYANOCOBALAMIN 1000 UG/ML
1000 INJECTION INTRAMUSCULAR; SUBCUTANEOUS
COMMUNITY
Start: 2021-01-01

## 2021-01-01 RX ORDER — PROPOFOL 10 MG/ML
INJECTION, EMULSION INTRAVENOUS
Status: DISCONTINUED
Start: 2021-01-01 | End: 2021-01-01

## 2021-01-01 RX ORDER — SODIUM CHLORIDE 9 MG/ML
INJECTION, SOLUTION INTRAVENOUS PRN
Status: DISCONTINUED | OUTPATIENT
Start: 2021-01-01 | End: 2021-01-01 | Stop reason: HOSPADM

## 2021-01-01 RX ORDER — ATORVASTATIN CALCIUM 10 MG/1
10 TABLET, FILM COATED ORAL DAILY
Status: DISCONTINUED | OUTPATIENT
Start: 2021-01-01 | End: 2021-01-01 | Stop reason: HOSPADM

## 2021-01-01 RX ORDER — DEXTROSE AND SODIUM CHLORIDE 5; .45 G/100ML; G/100ML
INJECTION, SOLUTION INTRAVENOUS CONTINUOUS PRN
Status: DISCONTINUED | OUTPATIENT
Start: 2021-01-01 | End: 2021-01-01 | Stop reason: HOSPADM

## 2021-01-01 RX ORDER — CALCIUM GLUCONATE 20 MG/ML
2000 INJECTION, SOLUTION INTRAVENOUS PRN
Status: DISCONTINUED | OUTPATIENT
Start: 2021-01-01 | End: 2021-01-01 | Stop reason: HOSPADM

## 2021-01-01 RX ORDER — POTASSIUM CHLORIDE 29.8 MG/ML
20 INJECTION INTRAVENOUS PRN
Status: DISCONTINUED | OUTPATIENT
Start: 2021-01-01 | End: 2021-01-01 | Stop reason: HOSPADM

## 2021-01-01 RX ORDER — BUDESONIDE 0.5 MG/2ML
0.5 INHALANT ORAL 4 TIMES DAILY
Status: DISCONTINUED | OUTPATIENT
Start: 2021-01-01 | End: 2021-01-01 | Stop reason: CLARIF

## 2021-01-01 RX ORDER — TRAZODONE HYDROCHLORIDE 50 MG/1
50 TABLET ORAL NIGHTLY
Status: DISCONTINUED | OUTPATIENT
Start: 2021-01-01 | End: 2021-01-01 | Stop reason: HOSPADM

## 2021-01-01 RX ORDER — CHLORHEXIDINE GLUCONATE 0.12 MG/ML
15 RINSE ORAL 2 TIMES DAILY
Status: DISCONTINUED | OUTPATIENT
Start: 2021-01-01 | End: 2021-01-01 | Stop reason: HOSPADM

## 2021-01-01 RX ORDER — ACETAMINOPHEN 325 MG/1
650 TABLET ORAL EVERY 6 HOURS PRN
Status: DISCONTINUED | OUTPATIENT
Start: 2021-01-01 | End: 2021-01-01 | Stop reason: HOSPADM

## 2021-01-01 RX ORDER — ASPIRIN 81 MG/1
81 TABLET, CHEWABLE ORAL DAILY
Status: DISCONTINUED | OUTPATIENT
Start: 2021-01-01 | End: 2021-01-01 | Stop reason: HOSPADM

## 2021-01-01 RX ORDER — GUAIFENESIN/DEXTROMETHORPHAN 100-10MG/5
5 SYRUP ORAL EVERY 4 HOURS PRN
Status: DISCONTINUED | OUTPATIENT
Start: 2021-01-01 | End: 2021-01-01

## 2021-01-01 RX ORDER — VORICONAZOLE 200 MG/1
200 TABLET, FILM COATED ORAL EVERY 12 HOURS SCHEDULED
Status: DISCONTINUED | OUTPATIENT
Start: 2021-01-01 | End: 2021-01-01

## 2021-01-01 RX ORDER — ORPHENADRINE CITRATE 30 MG/ML
60 INJECTION INTRAMUSCULAR; INTRAVENOUS ONCE
Status: COMPLETED | OUTPATIENT
Start: 2021-01-01 | End: 2021-01-01

## 2021-01-01 RX ORDER — MORPHINE SULFATE 4 MG/ML
4 INJECTION, SOLUTION INTRAMUSCULAR; INTRAVENOUS ONCE
Status: COMPLETED | OUTPATIENT
Start: 2021-01-01 | End: 2021-01-01

## 2021-01-01 RX ORDER — DEXAMETHASONE SODIUM PHOSPHATE 10 MG/ML
10 INJECTION INTRAMUSCULAR; INTRAVENOUS DAILY
Status: DISCONTINUED | OUTPATIENT
Start: 2021-01-01 | End: 2021-01-01

## 2021-01-01 RX ORDER — MONTELUKAST SODIUM 10 MG/1
10 TABLET ORAL NIGHTLY
COMMUNITY
Start: 2021-01-01

## 2021-01-01 RX ORDER — ACETAMINOPHEN 650 MG/1
650 SUPPOSITORY RECTAL EVERY 6 HOURS PRN
Status: DISCONTINUED | OUTPATIENT
Start: 2021-01-01 | End: 2021-01-01 | Stop reason: HOSPADM

## 2021-01-01 RX ORDER — DEXAMETHASONE 4 MG/1
10 TABLET ORAL DAILY
Status: DISCONTINUED | OUTPATIENT
Start: 2021-01-01 | End: 2021-01-01

## 2021-01-01 RX ORDER — SODIUM CHLORIDE 9 MG/ML
25 INJECTION, SOLUTION INTRAVENOUS PRN
Status: DISCONTINUED | OUTPATIENT
Start: 2021-01-01 | End: 2021-01-01 | Stop reason: HOSPADM

## 2021-01-01 RX ORDER — PREGABALIN 25 MG/1
50 CAPSULE ORAL 2 TIMES DAILY
Status: DISCONTINUED | OUTPATIENT
Start: 2021-01-01 | End: 2021-01-01 | Stop reason: DRUGHIGH

## 2021-01-01 RX ORDER — LORAZEPAM 2 MG/ML
1 INJECTION INTRAMUSCULAR EVERY 4 HOURS PRN
Status: DISCONTINUED | OUTPATIENT
Start: 2021-01-01 | End: 2021-01-01

## 2021-01-01 RX ORDER — INSULIN GLARGINE 100 [IU]/ML
30 INJECTION, SOLUTION SUBCUTANEOUS ONCE
Status: COMPLETED | OUTPATIENT
Start: 2021-01-01 | End: 2021-01-01

## 2021-01-01 RX ORDER — TIOTROPIUM BROMIDE INHALATION SPRAY 1.56 UG/1
2 SPRAY, METERED RESPIRATORY (INHALATION) DAILY
COMMUNITY
Start: 2021-01-01

## 2021-01-01 RX ORDER — LIDOCAINE HYDROCHLORIDE 10 MG/ML
5 INJECTION, SOLUTION EPIDURAL; INFILTRATION; INTRACAUDAL; PERINEURAL ONCE
Status: DISCONTINUED | OUTPATIENT
Start: 2021-01-01 | End: 2021-01-01

## 2021-01-01 RX ORDER — POTASSIUM CHLORIDE 20 MEQ/1
40 TABLET, EXTENDED RELEASE ORAL PRN
Status: DISCONTINUED | OUTPATIENT
Start: 2021-01-01 | End: 2021-01-01

## 2021-01-01 RX ORDER — FUROSEMIDE 10 MG/ML
80 INJECTION INTRAMUSCULAR; INTRAVENOUS ONCE
Status: COMPLETED | OUTPATIENT
Start: 2021-01-01 | End: 2021-01-01

## 2021-01-01 RX ORDER — LATANOPROST 50 UG/ML
1 SOLUTION/ DROPS OPHTHALMIC EVERY EVENING
COMMUNITY
Start: 2021-01-01

## 2021-01-01 RX ORDER — CALCIUM GLUCONATE 20 MG/ML
1000 INJECTION, SOLUTION INTRAVENOUS PRN
Status: DISCONTINUED | OUTPATIENT
Start: 2021-01-01 | End: 2021-01-01 | Stop reason: HOSPADM

## 2021-01-01 RX ORDER — INSULIN GLARGINE 100 [IU]/ML
55 INJECTION, SOLUTION SUBCUTANEOUS 2 TIMES DAILY
Status: DISCONTINUED | OUTPATIENT
Start: 2021-01-01 | End: 2021-01-01

## 2021-01-01 RX ORDER — VORICONAZOLE 200 MG/1
200 TABLET, FILM COATED ORAL EVERY 12 HOURS SCHEDULED
Status: DISCONTINUED | OUTPATIENT
Start: 2021-01-01 | End: 2021-01-01 | Stop reason: HOSPADM

## 2021-01-01 RX ORDER — MAGNESIUM OXIDE 400 MG/1
400 TABLET ORAL 2 TIMES DAILY
COMMUNITY
Start: 2021-01-01

## 2021-01-01 RX ORDER — ONDANSETRON 4 MG/1
4 TABLET, FILM COATED ORAL 3 TIMES DAILY PRN
Qty: 15 TABLET | Refills: 0 | Status: SHIPPED | OUTPATIENT
Start: 2021-01-01

## 2021-01-01 RX ORDER — INSULIN GLARGINE 100 [IU]/ML
60 INJECTION, SOLUTION SUBCUTANEOUS 2 TIMES DAILY
Status: DISCONTINUED | OUTPATIENT
Start: 2021-01-01 | End: 2021-01-01

## 2021-01-01 RX ORDER — MAGNESIUM SULFATE 1 G/100ML
1000 INJECTION INTRAVENOUS PRN
Status: DISCONTINUED | OUTPATIENT
Start: 2021-01-01 | End: 2021-01-01 | Stop reason: ALTCHOICE

## 2021-01-01 RX ORDER — POTASSIUM CHLORIDE 29.8 MG/ML
20 INJECTION INTRAVENOUS PRN
Status: DISCONTINUED | OUTPATIENT
Start: 2021-01-01 | End: 2021-01-01 | Stop reason: ALTCHOICE

## 2021-01-01 RX ORDER — DEXAMETHASONE SODIUM PHOSPHATE 10 MG/ML
6 INJECTION INTRAMUSCULAR; INTRAVENOUS DAILY
Status: COMPLETED | OUTPATIENT
Start: 2021-01-01 | End: 2021-01-01

## 2021-01-01 RX ORDER — INSULIN GLARGINE 100 [IU]/ML
43 INJECTION, SOLUTION SUBCUTANEOUS NIGHTLY
Status: DISCONTINUED | OUTPATIENT
Start: 2021-01-01 | End: 2021-01-01

## 2021-01-01 RX ORDER — INSULIN GLARGINE 100 [IU]/ML
25 INJECTION, SOLUTION SUBCUTANEOUS NIGHTLY
Status: DISCONTINUED | OUTPATIENT
Start: 2021-01-01 | End: 2021-01-01

## 2021-01-01 RX ORDER — DEXTROSE MONOHYDRATE 50 MG/ML
100 INJECTION, SOLUTION INTRAVENOUS PRN
Status: DISCONTINUED | OUTPATIENT
Start: 2021-01-01 | End: 2021-01-01 | Stop reason: HOSPADM

## 2021-01-01 RX ORDER — THEOPHYLLINE 300 MG/1
300 TABLET, EXTENDED RELEASE ORAL DAILY
COMMUNITY
Start: 2020-01-01

## 2021-01-01 RX ORDER — PROPOFOL 10 MG/ML
5-50 INJECTION, EMULSION INTRAVENOUS
Status: DISCONTINUED | OUTPATIENT
Start: 2021-01-01 | End: 2021-01-01 | Stop reason: HOSPADM

## 2021-01-01 RX ORDER — HEPARIN SODIUM 5000 [USP'U]/ML
5000 INJECTION, SOLUTION INTRAVENOUS; SUBCUTANEOUS EVERY 8 HOURS SCHEDULED
Status: DISCONTINUED | OUTPATIENT
Start: 2021-01-01 | End: 2021-01-01 | Stop reason: HOSPADM

## 2021-01-01 RX ORDER — KETOTIFEN FUMARATE 0.35 MG/ML
1 SOLUTION/ DROPS OPHTHALMIC 2 TIMES DAILY
COMMUNITY
Start: 2021-01-01

## 2021-01-01 RX ORDER — SODIUM POLYSTYRENE SULFONATE 15 G/60ML
30 SUSPENSION ORAL; RECTAL ONCE
Status: COMPLETED | OUTPATIENT
Start: 2021-01-01 | End: 2021-01-01

## 2021-01-01 RX ORDER — SODIUM CHLORIDE 9 MG/ML
INJECTION, SOLUTION INTRAVENOUS CONTINUOUS
Status: DISCONTINUED | OUTPATIENT
Start: 2021-01-01 | End: 2021-01-01

## 2021-01-01 RX ORDER — DEXTROSE MONOHYDRATE 25 G/50ML
12.5 INJECTION, SOLUTION INTRAVENOUS PRN
Status: DISCONTINUED | OUTPATIENT
Start: 2021-01-01 | End: 2021-01-01 | Stop reason: HOSPADM

## 2021-01-01 RX ORDER — FLUTICASONE PROPIONATE 220 UG/1
2 AEROSOL, METERED RESPIRATORY (INHALATION) 2 TIMES DAILY
Status: DISCONTINUED | OUTPATIENT
Start: 2021-01-01 | End: 2021-01-01 | Stop reason: HOSPADM

## 2021-01-01 RX ORDER — ONDANSETRON 4 MG/1
4 TABLET, ORALLY DISINTEGRATING ORAL EVERY 8 HOURS PRN
Status: DISCONTINUED | OUTPATIENT
Start: 2021-01-01 | End: 2021-01-01 | Stop reason: HOSPADM

## 2021-01-01 RX ORDER — SUCRALFATE 1 G/1
1 TABLET ORAL
Qty: 40 TABLET | Refills: 0 | Status: SHIPPED | OUTPATIENT
Start: 2021-01-01

## 2021-01-01 RX ORDER — INSULIN GLARGINE 100 [IU]/ML
30 INJECTION, SOLUTION SUBCUTANEOUS NIGHTLY
Status: DISCONTINUED | OUTPATIENT
Start: 2021-01-01 | End: 2021-01-01

## 2021-01-01 RX ORDER — INSULIN GLARGINE 100 [IU]/ML
50 INJECTION, SOLUTION SUBCUTANEOUS 2 TIMES DAILY
Status: DISCONTINUED | OUTPATIENT
Start: 2021-01-01 | End: 2021-01-01

## 2021-01-01 RX ORDER — RIZATRIPTAN BENZOATE 10 MG/1
10 TABLET ORAL PRN
COMMUNITY
Start: 2021-01-01

## 2021-01-01 RX ORDER — FLUTICASONE PROPIONATE 50 MCG
2 SPRAY, SUSPENSION (ML) NASAL DAILY
Status: DISCONTINUED | OUTPATIENT
Start: 2021-01-01 | End: 2021-01-01 | Stop reason: HOSPADM

## 2021-01-01 RX ORDER — DEXAMETHASONE SODIUM PHOSPHATE 10 MG/ML
6 INJECTION, SOLUTION INTRAMUSCULAR; INTRAVENOUS ONCE
Status: DISCONTINUED | OUTPATIENT
Start: 2021-01-01 | End: 2021-01-01

## 2021-01-01 RX ORDER — METOCLOPRAMIDE HYDROCHLORIDE 5 MG/ML
5 INJECTION INTRAMUSCULAR; INTRAVENOUS ONCE
Status: COMPLETED | OUTPATIENT
Start: 2021-01-01 | End: 2021-01-01

## 2021-01-01 RX ORDER — VENLAFAXINE HYDROCHLORIDE 75 MG/1
1 CAPSULE, EXTENDED RELEASE ORAL DAILY
COMMUNITY
Start: 2021-01-01

## 2021-01-01 RX ORDER — DEXAMETHASONE SODIUM PHOSPHATE 10 MG/ML
10 INJECTION, SOLUTION INTRAMUSCULAR; INTRAVENOUS EVERY MORNING
Status: DISCONTINUED | OUTPATIENT
Start: 2021-01-01 | End: 2021-01-01 | Stop reason: CLARIF

## 2021-01-01 RX ORDER — METHOCARBAMOL 750 MG/1
750 TABLET, FILM COATED ORAL 3 TIMES DAILY
Qty: 15 TABLET | Refills: 0 | Status: SHIPPED | OUTPATIENT
Start: 2021-01-01 | End: 2021-01-01

## 2021-01-01 RX ORDER — CONJUGATED ESTROGENS 0.62 MG/G
CREAM VAGINAL
COMMUNITY
Start: 2021-01-01

## 2021-01-01 RX ORDER — POTASSIUM CHLORIDE 7.45 MG/ML
10 INJECTION INTRAVENOUS PRN
Status: DISCONTINUED | OUTPATIENT
Start: 2021-01-01 | End: 2021-01-01

## 2021-01-01 RX ORDER — LOSARTAN POTASSIUM 50 MG/1
50 TABLET ORAL DAILY
Status: DISCONTINUED | OUTPATIENT
Start: 2021-01-01 | End: 2021-01-01

## 2021-01-01 RX ORDER — 0.9 % SODIUM CHLORIDE 0.9 %
30 INTRAVENOUS SOLUTION INTRAVENOUS PRN
Status: ACTIVE | OUTPATIENT
Start: 2021-01-01 | End: 2021-01-01

## 2021-01-01 RX ORDER — CARVEDILOL 6.25 MG/1
3.12 TABLET ORAL DAILY
Status: DISCONTINUED | OUTPATIENT
Start: 2021-01-01 | End: 2021-01-01 | Stop reason: HOSPADM

## 2021-01-01 RX ORDER — DEXAMETHASONE SODIUM PHOSPHATE 4 MG/ML
6 INJECTION, SOLUTION INTRA-ARTICULAR; INTRALESIONAL; INTRAMUSCULAR; INTRAVENOUS; SOFT TISSUE ONCE
Status: COMPLETED | OUTPATIENT
Start: 2021-01-01 | End: 2021-01-01

## 2021-01-01 RX ORDER — SODIUM CHLORIDE 0.9 % (FLUSH) 0.9 %
5-40 SYRINGE (ML) INJECTION PRN
Status: DISCONTINUED | OUTPATIENT
Start: 2021-01-01 | End: 2021-01-01 | Stop reason: HOSPADM

## 2021-01-01 RX ORDER — INSULIN GLARGINE 100 [IU]/ML
30 INJECTION, SOLUTION SUBCUTANEOUS 2 TIMES DAILY
Status: DISCONTINUED | OUTPATIENT
Start: 2021-01-01 | End: 2021-01-01

## 2021-01-01 RX ORDER — ACETAMINOPHEN 500 MG
1000 TABLET ORAL ONCE
Status: COMPLETED | OUTPATIENT
Start: 2021-01-01 | End: 2021-01-01

## 2021-01-01 RX ORDER — CARVEDILOL 3.12 MG/1
3.12 TABLET ORAL DAILY
Qty: 180 TABLET | Refills: 3 | Status: SHIPPED | OUTPATIENT
Start: 2021-01-01

## 2021-01-01 RX ORDER — NICOTINE POLACRILEX 4 MG
15 LOZENGE BUCCAL PRN
Status: DISCONTINUED | OUTPATIENT
Start: 2021-01-01 | End: 2021-01-01 | Stop reason: HOSPADM

## 2021-01-01 RX ORDER — ALBUTEROL SULFATE 90 UG/1
2 AEROSOL, METERED RESPIRATORY (INHALATION) EVERY 6 HOURS PRN
COMMUNITY
Start: 2021-01-01

## 2021-01-01 RX ORDER — SODIUM CHLORIDE 9 MG/ML
INJECTION, SOLUTION INTRAVENOUS CONTINUOUS
Status: ACTIVE | OUTPATIENT
Start: 2021-01-01 | End: 2021-01-01

## 2021-01-01 RX ORDER — SODIUM CHLORIDE 0.9 % (FLUSH) 0.9 %
5-40 SYRINGE (ML) INJECTION EVERY 12 HOURS SCHEDULED
Status: DISCONTINUED | OUTPATIENT
Start: 2021-01-01 | End: 2021-01-01 | Stop reason: HOSPADM

## 2021-01-01 RX ORDER — MAGNESIUM SULFATE IN WATER 40 MG/ML
2000 INJECTION, SOLUTION INTRAVENOUS ONCE
Status: COMPLETED | OUTPATIENT
Start: 2021-01-01 | End: 2021-01-01

## 2021-01-01 RX ORDER — INSULIN GLARGINE 100 [IU]/ML
35 INJECTION, SOLUTION SUBCUTANEOUS 2 TIMES DAILY
Status: DISCONTINUED | OUTPATIENT
Start: 2021-01-01 | End: 2021-01-01

## 2021-01-01 RX ORDER — EPINEPHRINE 1 MG/ML
INJECTION, SOLUTION, CONCENTRATE INTRAVENOUS
Status: COMPLETED | OUTPATIENT
Start: 2021-01-01 | End: 2021-01-01

## 2021-01-01 RX ORDER — MORPHINE SULFATE 2 MG/ML
2 INJECTION, SOLUTION INTRAMUSCULAR; INTRAVENOUS EVERY 6 HOURS PRN
Status: DISCONTINUED | OUTPATIENT
Start: 2021-01-01 | End: 2021-01-01

## 2021-01-01 RX ORDER — SODIUM CHLORIDE 0.9 % (FLUSH) 0.9 %
5-40 SYRINGE (ML) INJECTION PRN
Status: DISCONTINUED | OUTPATIENT
Start: 2021-01-01 | End: 2021-01-01

## 2021-01-01 RX ORDER — MAGNESIUM SULFATE 1 G/100ML
1000 INJECTION INTRAVENOUS PRN
Status: DISCONTINUED | OUTPATIENT
Start: 2021-01-01 | End: 2021-01-01 | Stop reason: HOSPADM

## 2021-01-01 RX ORDER — SODIUM CHLORIDE 0.9 % (FLUSH) 0.9 %
5-40 SYRINGE (ML) INJECTION EVERY 12 HOURS SCHEDULED
Status: DISCONTINUED | OUTPATIENT
Start: 2021-01-01 | End: 2021-01-01

## 2021-01-01 RX ORDER — AZELASTINE 1 MG/ML
2 SPRAY, METERED NASAL 2 TIMES DAILY
COMMUNITY
Start: 2021-01-01

## 2021-01-01 RX ADMIN — DEXTROSE MONOHYDRATE 265 MG: 50 INJECTION, SOLUTION INTRAVENOUS at 14:37

## 2021-01-01 RX ADMIN — GUAIFENESIN SYRUP AND DEXTROMETHORPHAN 5 ML: 100; 10 SYRUP ORAL at 02:43

## 2021-01-01 RX ADMIN — ENOXAPARIN SODIUM 30 MG: 30 INJECTION SUBCUTANEOUS at 21:47

## 2021-01-01 RX ADMIN — ENOXAPARIN SODIUM 30 MG: 30 INJECTION SUBCUTANEOUS at 09:01

## 2021-01-01 RX ADMIN — TRAZODONE HYDROCHLORIDE 50 MG: 50 TABLET ORAL at 21:06

## 2021-01-01 RX ADMIN — VASOPRESSIN 0.04 UNITS/MIN: 20 INJECTION INTRAVENOUS at 13:25

## 2021-01-01 RX ADMIN — TRAZODONE HYDROCHLORIDE 50 MG: 50 TABLET ORAL at 21:46

## 2021-01-01 RX ADMIN — INSULIN LISPRO 12 UNITS: 100 INJECTION, SOLUTION INTRAVENOUS; SUBCUTANEOUS at 17:35

## 2021-01-01 RX ADMIN — Medication 10 MG: at 12:48

## 2021-01-01 RX ADMIN — SODIUM CHLORIDE, PRESERVATIVE FREE 10 ML: 5 INJECTION INTRAVENOUS at 21:24

## 2021-01-01 RX ADMIN — ENOXAPARIN SODIUM 30 MG: 30 INJECTION SUBCUTANEOUS at 22:55

## 2021-01-01 RX ADMIN — INSULIN LISPRO 18 UNITS: 100 INJECTION, SOLUTION INTRAVENOUS; SUBCUTANEOUS at 12:27

## 2021-01-01 RX ADMIN — TRAZODONE HYDROCHLORIDE 50 MG: 50 TABLET ORAL at 21:39

## 2021-01-01 RX ADMIN — ASPIRIN 81 MG CHEWABLE TABLET 81 MG: 81 TABLET CHEWABLE at 08:57

## 2021-01-01 RX ADMIN — FLUTICASONE PROPIONATE 2 PUFF: 220 AEROSOL, METERED RESPIRATORY (INHALATION) at 08:28

## 2021-01-01 RX ADMIN — FLUTICASONE PROPIONATE 2 SPRAY: 50 SPRAY, METERED NASAL at 08:47

## 2021-01-01 RX ADMIN — Medication 2 PUFF: at 08:03

## 2021-01-01 RX ADMIN — PREGABALIN 200 MG: 100 CAPSULE ORAL at 09:03

## 2021-01-01 RX ADMIN — DEXTROSE MONOHYDRATE 100 MG: 50 INJECTION, SOLUTION INTRAVENOUS at 16:23

## 2021-01-01 RX ADMIN — VORICONAZOLE 200 MG: 200 TABLET ORAL at 09:10

## 2021-01-01 RX ADMIN — INSULIN GLARGINE 50 UNITS: 100 INJECTION, SOLUTION SUBCUTANEOUS at 09:27

## 2021-01-01 RX ADMIN — DEXTROSE MONOHYDRATE 265 MG: 50 INJECTION, SOLUTION INTRAVENOUS at 00:45

## 2021-01-01 RX ADMIN — PREGABALIN 200 MG: 100 CAPSULE ORAL at 09:25

## 2021-01-01 RX ADMIN — SODIUM CHLORIDE, PRESERVATIVE FREE 10 ML: 5 INJECTION INTRAVENOUS at 08:32

## 2021-01-01 RX ADMIN — ACETAMINOPHEN 650 MG: 325 TABLET ORAL at 05:15

## 2021-01-01 RX ADMIN — PREGABALIN 200 MG: 100 CAPSULE ORAL at 09:38

## 2021-01-01 RX ADMIN — INSULIN LISPRO 6 UNITS: 100 INJECTION, SOLUTION INTRAVENOUS; SUBCUTANEOUS at 21:59

## 2021-01-01 RX ADMIN — TRAZODONE HYDROCHLORIDE 50 MG: 50 TABLET ORAL at 20:56

## 2021-01-01 RX ADMIN — INSULIN LISPRO 25 UNITS: 100 INJECTION, SOLUTION INTRAVENOUS; SUBCUTANEOUS at 12:48

## 2021-01-01 RX ADMIN — PREGABALIN 200 MG: 100 CAPSULE ORAL at 21:17

## 2021-01-01 RX ADMIN — HEPARIN SODIUM 5000 UNITS: 5000 INJECTION INTRAVENOUS; SUBCUTANEOUS at 05:49

## 2021-01-01 RX ADMIN — TRAZODONE HYDROCHLORIDE 50 MG: 50 TABLET ORAL at 21:30

## 2021-01-01 RX ADMIN — ENOXAPARIN SODIUM 30 MG: 30 INJECTION SUBCUTANEOUS at 12:35

## 2021-01-01 RX ADMIN — DEXAMETHASONE SODIUM PHOSPHATE 6 MG: 10 INJECTION INTRAMUSCULAR; INTRAVENOUS at 09:04

## 2021-01-01 RX ADMIN — PREGABALIN 200 MG: 100 CAPSULE ORAL at 21:06

## 2021-01-01 RX ADMIN — INSULIN LISPRO 7 UNITS: 100 INJECTION, SOLUTION INTRAVENOUS; SUBCUTANEOUS at 22:29

## 2021-01-01 RX ADMIN — CHLORHEXIDINE GLUCONATE 15 ML: 1.2 RINSE ORAL at 19:49

## 2021-01-01 RX ADMIN — BARICITINIB 4 MG: 2 TABLET, FILM COATED ORAL at 14:32

## 2021-01-01 RX ADMIN — INSULIN LISPRO 9 UNITS: 100 INJECTION, SOLUTION INTRAVENOUS; SUBCUTANEOUS at 21:18

## 2021-01-01 RX ADMIN — INSULIN LISPRO 1 UNITS: 100 INJECTION, SOLUTION INTRAVENOUS; SUBCUTANEOUS at 20:35

## 2021-01-01 RX ADMIN — BARICITINIB 4 MG: 2 TABLET, FILM COATED ORAL at 14:35

## 2021-01-01 RX ADMIN — REMDESIVIR 100 MG: 5 INJECTION INTRAVENOUS at 12:35

## 2021-01-01 RX ADMIN — ASPIRIN 81 MG CHEWABLE TABLET 81 MG: 81 TABLET CHEWABLE at 13:48

## 2021-01-01 RX ADMIN — TRAZODONE HYDROCHLORIDE 50 MG: 50 TABLET ORAL at 21:17

## 2021-01-01 RX ADMIN — Medication 3 MG: at 20:34

## 2021-01-01 RX ADMIN — INSULIN LISPRO 3 UNITS: 100 INJECTION, SOLUTION INTRAVENOUS; SUBCUTANEOUS at 21:48

## 2021-01-01 RX ADMIN — SODIUM CHLORIDE, PRESERVATIVE FREE 10 ML: 5 INJECTION INTRAVENOUS at 20:37

## 2021-01-01 RX ADMIN — ENOXAPARIN SODIUM 30 MG: 30 INJECTION SUBCUTANEOUS at 08:58

## 2021-01-01 RX ADMIN — DEXAMETHASONE SODIUM PHOSPHATE 6 MG: 4 INJECTION, SOLUTION INTRAMUSCULAR; INTRAVENOUS at 12:08

## 2021-01-01 RX ADMIN — PROPOFOL 30 MCG/KG/MIN: 10 INJECTION, EMULSION INTRAVENOUS at 21:19

## 2021-01-01 RX ADMIN — DEXAMETHASONE SODIUM PHOSPHATE 6 MG: 10 INJECTION INTRAMUSCULAR; INTRAVENOUS at 09:39

## 2021-01-01 RX ADMIN — BARICITINIB 4 MG: 2 TABLET, FILM COATED ORAL at 09:17

## 2021-01-01 RX ADMIN — GUAIFENESIN SYRUP AND DEXTROMETHORPHAN 5 ML: 100; 10 SYRUP ORAL at 21:39

## 2021-01-01 RX ADMIN — DEXAMETHASONE SODIUM PHOSPHATE 14 MG: 4 INJECTION, SOLUTION INTRA-ARTICULAR; INTRALESIONAL; INTRAMUSCULAR; INTRAVENOUS; SOFT TISSUE at 16:21

## 2021-01-01 RX ADMIN — BARICITINIB 2 MG: 2 TABLET, FILM COATED ORAL at 13:45

## 2021-01-01 RX ADMIN — REMDESIVIR 100 MG: 5 INJECTION INTRAVENOUS at 12:32

## 2021-01-01 RX ADMIN — MIDAZOLAM 6 MG/HR: 5 INJECTION INTRAMUSCULAR; INTRAVENOUS at 03:41

## 2021-01-01 RX ADMIN — INSULIN GLARGINE 60 UNITS: 100 INJECTION, SOLUTION SUBCUTANEOUS at 21:41

## 2021-01-01 RX ADMIN — ATORVASTATIN CALCIUM 10 MG: 10 TABLET, FILM COATED ORAL at 08:34

## 2021-01-01 RX ADMIN — VASOPRESSIN 0.04 UNITS/MIN: 20 INJECTION INTRAVENOUS at 14:55

## 2021-01-01 RX ADMIN — ASPIRIN 81 MG CHEWABLE TABLET 81 MG: 81 TABLET CHEWABLE at 09:03

## 2021-01-01 RX ADMIN — DEXAMETHASONE SODIUM PHOSPHATE 6 MG: 4 INJECTION, SOLUTION INTRAMUSCULAR; INTRAVENOUS at 01:36

## 2021-01-01 RX ADMIN — VORICONAZOLE 200 MG: 200 TABLET ORAL at 16:39

## 2021-01-01 RX ADMIN — ASPIRIN 81 MG CHEWABLE TABLET 81 MG: 81 TABLET CHEWABLE at 08:49

## 2021-01-01 RX ADMIN — VORICONAZOLE 200 MG: 200 TABLET ORAL at 21:06

## 2021-01-01 RX ADMIN — EPINEPHRINE 1 MG: 1 INJECTION, SOLUTION, CONCENTRATE INTRAVENOUS at 16:47

## 2021-01-01 RX ADMIN — INSULIN GLARGINE 30 UNITS: 100 INJECTION, SOLUTION SUBCUTANEOUS at 09:26

## 2021-01-01 RX ADMIN — ATORVASTATIN CALCIUM 10 MG: 10 TABLET, FILM COATED ORAL at 13:48

## 2021-01-01 RX ADMIN — SODIUM CHLORIDE, PRESERVATIVE FREE 10 ML: 5 INJECTION INTRAVENOUS at 08:59

## 2021-01-01 RX ADMIN — INSULIN LISPRO 35 UNITS: 100 INJECTION, SOLUTION INTRAVENOUS; SUBCUTANEOUS at 17:50

## 2021-01-01 RX ADMIN — SODIUM BICARBONATE: 84 INJECTION, SOLUTION INTRAVENOUS at 14:54

## 2021-01-01 RX ADMIN — FLUTICASONE PROPIONATE 2 PUFF: 220 AEROSOL, METERED RESPIRATORY (INHALATION) at 08:03

## 2021-01-01 RX ADMIN — ASPIRIN 81 MG CHEWABLE TABLET 81 MG: 81 TABLET CHEWABLE at 08:43

## 2021-01-01 RX ADMIN — INSULIN LISPRO 9 UNITS: 100 INJECTION, SOLUTION INTRAVENOUS; SUBCUTANEOUS at 18:00

## 2021-01-01 RX ADMIN — Medication 10 MG: at 20:06

## 2021-01-01 RX ADMIN — INSULIN LISPRO 20 UNITS: 100 INJECTION, SOLUTION INTRAVENOUS; SUBCUTANEOUS at 16:22

## 2021-01-01 RX ADMIN — INSULIN GLARGINE 35 UNITS: 100 INJECTION, SOLUTION SUBCUTANEOUS at 09:20

## 2021-01-01 RX ADMIN — CARVEDILOL 3.12 MG: 6.25 TABLET, FILM COATED ORAL at 08:42

## 2021-01-01 RX ADMIN — SODIUM CHLORIDE, PRESERVATIVE FREE 10 ML: 5 INJECTION INTRAVENOUS at 08:46

## 2021-01-01 RX ADMIN — BARICITINIB 4 MG: 2 TABLET, FILM COATED ORAL at 12:48

## 2021-01-01 RX ADMIN — PROPOFOL 30 MCG/KG/MIN: 10 INJECTION, EMULSION INTRAVENOUS at 08:37

## 2021-01-01 RX ADMIN — LORAZEPAM 1 MG: 2 INJECTION INTRAMUSCULAR; INTRAVENOUS at 17:35

## 2021-01-01 RX ADMIN — INSULIN LISPRO 9 UNITS: 100 INJECTION, SOLUTION INTRAVENOUS; SUBCUTANEOUS at 12:44

## 2021-01-01 RX ADMIN — DEXAMETHASONE SODIUM PHOSPHATE 20 MG: 4 INJECTION, SOLUTION INTRAMUSCULAR; INTRAVENOUS at 11:42

## 2021-01-01 RX ADMIN — INSULIN LISPRO 4 UNITS: 100 INJECTION, SOLUTION INTRAVENOUS; SUBCUTANEOUS at 18:08

## 2021-01-01 RX ADMIN — FLUTICASONE PROPIONATE 2 PUFF: 220 AEROSOL, METERED RESPIRATORY (INHALATION) at 20:41

## 2021-01-01 RX ADMIN — LOSARTAN POTASSIUM 100 MG: 100 TABLET, FILM COATED ORAL at 16:15

## 2021-01-01 RX ADMIN — ENOXAPARIN SODIUM 30 MG: 30 INJECTION SUBCUTANEOUS at 21:06

## 2021-01-01 RX ADMIN — INSULIN GLARGINE 45 UNITS: 100 INJECTION, SOLUTION SUBCUTANEOUS at 09:37

## 2021-01-01 RX ADMIN — CISATRACURIUM BESYLATE 4.49 MCG/KG/MIN: 200 INJECTION INTRAVENOUS at 10:18

## 2021-01-01 RX ADMIN — PREGABALIN 200 MG: 100 CAPSULE ORAL at 21:39

## 2021-01-01 RX ADMIN — INSULIN LISPRO 9 UNITS: 100 INJECTION, SOLUTION INTRAVENOUS; SUBCUTANEOUS at 13:13

## 2021-01-01 RX ADMIN — SODIUM CHLORIDE, PRESERVATIVE FREE 10 ML: 5 INJECTION INTRAVENOUS at 09:37

## 2021-01-01 RX ADMIN — PROPOFOL 30 MCG/KG/MIN: 10 INJECTION, EMULSION INTRAVENOUS at 11:53

## 2021-01-01 RX ADMIN — ASPIRIN 81 MG CHEWABLE TABLET 81 MG: 81 TABLET CHEWABLE at 08:58

## 2021-01-01 RX ADMIN — INSULIN LISPRO 12 UNITS: 100 INJECTION, SOLUTION INTRAVENOUS; SUBCUTANEOUS at 17:02

## 2021-01-01 RX ADMIN — INSULIN LISPRO 25 UNITS: 100 INJECTION, SOLUTION INTRAVENOUS; SUBCUTANEOUS at 12:36

## 2021-01-01 RX ADMIN — SODIUM CHLORIDE, PRESERVATIVE FREE 10 ML: 5 INJECTION INTRAVENOUS at 00:19

## 2021-01-01 RX ADMIN — CARVEDILOL 3.12 MG: 6.25 TABLET, FILM COATED ORAL at 09:31

## 2021-01-01 RX ADMIN — FLUTICASONE PROPIONATE 2 PUFF: 220 AEROSOL, METERED RESPIRATORY (INHALATION) at 08:22

## 2021-01-01 RX ADMIN — INSULIN GLARGINE 45 UNITS: 100 INJECTION, SOLUTION SUBCUTANEOUS at 21:49

## 2021-01-01 RX ADMIN — MORPHINE SULFATE 4 MG: 4 INJECTION, SOLUTION INTRAMUSCULAR; INTRAVENOUS at 14:39

## 2021-01-01 RX ADMIN — CISATRACURIUM BESYLATE 2.5 MCG/KG/MIN: 200 INJECTION INTRAVENOUS at 20:30

## 2021-01-01 RX ADMIN — REMDESIVIR 100 MG: 5 INJECTION INTRAVENOUS at 12:27

## 2021-01-01 RX ADMIN — ENOXAPARIN SODIUM 30 MG: 30 INJECTION SUBCUTANEOUS at 08:46

## 2021-01-01 RX ADMIN — LOSARTAN POTASSIUM 100 MG: 100 TABLET, FILM COATED ORAL at 08:46

## 2021-01-01 RX ADMIN — PHENYLEPHRINE HYDROCHLORIDE 75 MCG/MIN: 10 INJECTION INTRAVENOUS at 17:33

## 2021-01-01 RX ADMIN — FLUTICASONE PROPIONATE 2 PUFF: 220 AEROSOL, METERED RESPIRATORY (INHALATION) at 19:36

## 2021-01-01 RX ADMIN — GUAIFENESIN SYRUP AND DEXTROMETHORPHAN 5 ML: 100; 10 SYRUP ORAL at 20:55

## 2021-01-01 RX ADMIN — BARICITINIB 4 MG: 2 TABLET, FILM COATED ORAL at 12:33

## 2021-01-01 RX ADMIN — ASPIRIN 81 MG CHEWABLE TABLET 81 MG: 81 TABLET CHEWABLE at 09:25

## 2021-01-01 RX ADMIN — Medication 15 MCG/MIN: at 14:58

## 2021-01-01 RX ADMIN — ATORVASTATIN CALCIUM 10 MG: 10 TABLET, FILM COATED ORAL at 09:03

## 2021-01-01 RX ADMIN — INSULIN LISPRO 5 UNITS: 100 INJECTION, SOLUTION INTRAVENOUS; SUBCUTANEOUS at 22:55

## 2021-01-01 RX ADMIN — GUAIFENESIN SYRUP AND DEXTROMETHORPHAN 5 ML: 100; 10 SYRUP ORAL at 05:36

## 2021-01-01 RX ADMIN — TRAZODONE HYDROCHLORIDE 50 MG: 50 TABLET ORAL at 20:48

## 2021-01-01 RX ADMIN — ENOXAPARIN SODIUM 30 MG: 30 INJECTION SUBCUTANEOUS at 21:35

## 2021-01-01 RX ADMIN — INSULIN LISPRO 15 UNITS: 100 INJECTION, SOLUTION INTRAVENOUS; SUBCUTANEOUS at 18:33

## 2021-01-01 RX ADMIN — INSULIN LISPRO 6 UNITS: 100 INJECTION, SOLUTION INTRAVENOUS; SUBCUTANEOUS at 17:49

## 2021-01-01 RX ADMIN — SODIUM CHLORIDE 30 ML: 9 INJECTION, SOLUTION INTRAVENOUS at 12:32

## 2021-01-01 RX ADMIN — BARICITINIB 4 MG: 2 TABLET, FILM COATED ORAL at 14:54

## 2021-01-01 RX ADMIN — PREGABALIN 200 MG: 100 CAPSULE ORAL at 09:02

## 2021-01-01 RX ADMIN — FAMOTIDINE 20 MG: 10 INJECTION, SOLUTION INTRAVENOUS at 08:36

## 2021-01-01 RX ADMIN — INSULIN GLARGINE 60 UNITS: 100 INJECTION, SOLUTION SUBCUTANEOUS at 21:03

## 2021-01-01 RX ADMIN — LOSARTAN POTASSIUM 100 MG: 100 TABLET, FILM COATED ORAL at 09:32

## 2021-01-01 RX ADMIN — INSULIN LISPRO 15 UNITS: 100 INJECTION, SOLUTION INTRAVENOUS; SUBCUTANEOUS at 13:17

## 2021-01-01 RX ADMIN — SODIUM CHLORIDE: 9 INJECTION, SOLUTION INTRAVENOUS at 12:54

## 2021-01-01 RX ADMIN — INSULIN LISPRO 8 UNITS: 100 INJECTION, SOLUTION INTRAVENOUS; SUBCUTANEOUS at 12:11

## 2021-01-01 RX ADMIN — Medication 2 PUFF: at 21:07

## 2021-01-01 RX ADMIN — Medication 3 MG: at 20:03

## 2021-01-01 RX ADMIN — PREGABALIN 200 MG: 100 CAPSULE ORAL at 20:56

## 2021-01-01 RX ADMIN — INSULIN LISPRO 8 UNITS: 100 INJECTION, SOLUTION INTRAVENOUS; SUBCUTANEOUS at 09:32

## 2021-01-01 RX ADMIN — INSULIN LISPRO 4 UNITS: 100 INJECTION, SOLUTION INTRAVENOUS; SUBCUTANEOUS at 09:07

## 2021-01-01 RX ADMIN — CHLORHEXIDINE GLUCONATE 15 ML: 1.2 RINSE ORAL at 20:57

## 2021-01-01 RX ADMIN — TRAZODONE HYDROCHLORIDE 50 MG: 50 TABLET ORAL at 21:55

## 2021-01-01 RX ADMIN — INSULIN LISPRO 10 UNITS: 100 INJECTION, SOLUTION INTRAVENOUS; SUBCUTANEOUS at 09:20

## 2021-01-01 RX ADMIN — ENOXAPARIN SODIUM 30 MG: 30 INJECTION SUBCUTANEOUS at 09:04

## 2021-01-01 RX ADMIN — POTASSIUM CHLORIDE 40 MEQ: 1500 TABLET, EXTENDED RELEASE ORAL at 12:44

## 2021-01-01 RX ADMIN — FLUTICASONE PROPIONATE 2 PUFF: 220 AEROSOL, METERED RESPIRATORY (INHALATION) at 12:12

## 2021-01-01 RX ADMIN — INSULIN LISPRO 12 UNITS: 100 INJECTION, SOLUTION INTRAVENOUS; SUBCUTANEOUS at 17:45

## 2021-01-01 RX ADMIN — INSULIN LISPRO 25 UNITS: 100 INJECTION, SOLUTION INTRAVENOUS; SUBCUTANEOUS at 17:17

## 2021-01-01 RX ADMIN — PREGABALIN 200 MG: 100 CAPSULE ORAL at 08:58

## 2021-01-01 RX ADMIN — INSULIN LISPRO 25 UNITS: 100 INJECTION, SOLUTION INTRAVENOUS; SUBCUTANEOUS at 17:09

## 2021-01-01 RX ADMIN — WHITE PETROLATUM 57.7 %-MINERAL OIL 31.9 % EYE OINTMENT: at 16:39

## 2021-01-01 RX ADMIN — MIDAZOLAM 6 MG/HR: 5 INJECTION INTRAMUSCULAR; INTRAVENOUS at 16:15

## 2021-01-01 RX ADMIN — FLUTICASONE PROPIONATE 2 SPRAY: 50 SPRAY, METERED NASAL at 09:11

## 2021-01-01 RX ADMIN — ATORVASTATIN CALCIUM 10 MG: 10 TABLET, FILM COATED ORAL at 09:39

## 2021-01-01 RX ADMIN — INSULIN LISPRO 7 UNITS: 100 INJECTION, SOLUTION INTRAVENOUS; SUBCUTANEOUS at 22:56

## 2021-01-01 RX ADMIN — PREGABALIN 200 MG: 100 CAPSULE ORAL at 20:35

## 2021-01-01 RX ADMIN — ENOXAPARIN SODIUM 30 MG: 30 INJECTION SUBCUTANEOUS at 21:40

## 2021-01-01 RX ADMIN — WHITE PETROLATUM 57.7 %-MINERAL OIL 31.9 % EYE OINTMENT: at 01:00

## 2021-01-01 RX ADMIN — INSULIN LISPRO 25 UNITS: 100 INJECTION, SOLUTION INTRAVENOUS; SUBCUTANEOUS at 08:52

## 2021-01-01 RX ADMIN — INSULIN LISPRO 18 UNITS: 100 INJECTION, SOLUTION INTRAVENOUS; SUBCUTANEOUS at 17:36

## 2021-01-01 RX ADMIN — PROPOFOL 30 MCG/KG/MIN: 10 INJECTION, EMULSION INTRAVENOUS at 00:31

## 2021-01-01 RX ADMIN — FLUTICASONE PROPIONATE 2 PUFF: 220 AEROSOL, METERED RESPIRATORY (INHALATION) at 20:56

## 2021-01-01 RX ADMIN — FLUTICASONE PROPIONATE 2 SPRAY: 50 SPRAY, METERED NASAL at 09:27

## 2021-01-01 RX ADMIN — INSULIN GLARGINE 30 UNITS: 100 INJECTION, SOLUTION SUBCUTANEOUS at 21:58

## 2021-01-01 RX ADMIN — HEPARIN SODIUM 5000 UNITS: 5000 INJECTION INTRAVENOUS; SUBCUTANEOUS at 06:24

## 2021-01-01 RX ADMIN — SODIUM CHLORIDE, PRESERVATIVE FREE 10 ML: 5 INJECTION INTRAVENOUS at 09:06

## 2021-01-01 RX ADMIN — MIDAZOLAM 4 MG/HR: 5 INJECTION INTRAMUSCULAR; INTRAVENOUS at 15:05

## 2021-01-01 RX ADMIN — INSULIN LISPRO 9 UNITS: 100 INJECTION, SOLUTION INTRAVENOUS; SUBCUTANEOUS at 17:08

## 2021-01-01 RX ADMIN — LOSARTAN POTASSIUM 100 MG: 100 TABLET, FILM COATED ORAL at 09:03

## 2021-01-01 RX ADMIN — INSULIN GLARGINE 25 UNITS: 100 INJECTION, SOLUTION SUBCUTANEOUS at 21:30

## 2021-01-01 RX ADMIN — PHENYLEPHRINE HYDROCHLORIDE 100 MCG/MIN: 10 INJECTION INTRAVENOUS at 20:28

## 2021-01-01 RX ADMIN — CARVEDILOL 3.12 MG: 6.25 TABLET, FILM COATED ORAL at 08:59

## 2021-01-01 RX ADMIN — ACETAMINOPHEN 650 MG: 325 TABLET ORAL at 12:01

## 2021-01-01 RX ADMIN — DEXAMETHASONE SODIUM PHOSPHATE 10 MG: 10 INJECTION INTRAMUSCULAR; INTRAVENOUS at 09:33

## 2021-01-01 RX ADMIN — INSULIN LISPRO 3 UNITS: 100 INJECTION, SOLUTION INTRAVENOUS; SUBCUTANEOUS at 08:49

## 2021-01-01 RX ADMIN — ATORVASTATIN CALCIUM 10 MG: 10 TABLET, FILM COATED ORAL at 08:43

## 2021-01-01 RX ADMIN — INSULIN LISPRO 6 UNITS: 100 INJECTION, SOLUTION INTRAVENOUS; SUBCUTANEOUS at 12:29

## 2021-01-01 RX ADMIN — FAMOTIDINE 20 MG: 10 INJECTION, SOLUTION INTRAVENOUS at 20:36

## 2021-01-01 RX ADMIN — WHITE PETROLATUM 57.7 %-MINERAL OIL 31.9 % EYE OINTMENT: at 20:47

## 2021-01-01 RX ADMIN — PREGABALIN 200 MG: 100 CAPSULE ORAL at 22:56

## 2021-01-01 RX ADMIN — MIDAZOLAM 6 MG/HR: 5 INJECTION INTRAMUSCULAR; INTRAVENOUS at 21:51

## 2021-01-01 RX ADMIN — CISATRACURIUM BESYLATE 3 MCG/KG/MIN: 200 INJECTION INTRAVENOUS at 03:22

## 2021-01-01 RX ADMIN — Medication 10 MG: at 09:26

## 2021-01-01 RX ADMIN — ENOXAPARIN SODIUM 30 MG: 30 INJECTION SUBCUTANEOUS at 13:49

## 2021-01-01 RX ADMIN — INSULIN GLARGINE 30 UNITS: 100 INJECTION, SOLUTION SUBCUTANEOUS at 21:18

## 2021-01-01 RX ADMIN — PREGABALIN 200 MG: 100 CAPSULE ORAL at 08:47

## 2021-01-01 RX ADMIN — DIPHENHYDRAMINE HYDROCHLORIDE 25 MG: 50 INJECTION, SOLUTION INTRAMUSCULAR; INTRAVENOUS at 15:05

## 2021-01-01 RX ADMIN — ONDANSETRON 4 MG: 2 INJECTION INTRAMUSCULAR; INTRAVENOUS at 13:17

## 2021-01-01 RX ADMIN — FLUTICASONE PROPIONATE 2 SPRAY: 50 SPRAY, METERED NASAL at 09:29

## 2021-01-01 RX ADMIN — BARICITINIB 2 MG: 2 TABLET, FILM COATED ORAL at 14:15

## 2021-01-01 RX ADMIN — WHITE PETROLATUM 57.7 %-MINERAL OIL 31.9 % EYE OINTMENT: at 08:11

## 2021-01-01 RX ADMIN — CEFEPIME HYDROCHLORIDE 1000 MG: 1 INJECTION, POWDER, FOR SOLUTION INTRAMUSCULAR; INTRAVENOUS at 15:31

## 2021-01-01 RX ADMIN — CHLORHEXIDINE GLUCONATE 15 ML: 1.2 RINSE ORAL at 07:55

## 2021-01-01 RX ADMIN — BARICITINIB 4 MG: 2 TABLET, FILM COATED ORAL at 12:26

## 2021-01-01 RX ADMIN — ORPHENADRINE CITRATE 60 MG: 30 INJECTION INTRAMUSCULAR; INTRAVENOUS at 17:11

## 2021-01-01 RX ADMIN — INSULIN LISPRO 25 UNITS: 100 INJECTION, SOLUTION INTRAVENOUS; SUBCUTANEOUS at 18:03

## 2021-01-01 RX ADMIN — SODIUM CHLORIDE, PRESERVATIVE FREE 10 ML: 5 INJECTION INTRAVENOUS at 21:40

## 2021-01-01 RX ADMIN — CEFEPIME HYDROCHLORIDE 1000 MG: 1 INJECTION, POWDER, FOR SOLUTION INTRAMUSCULAR; INTRAVENOUS at 15:29

## 2021-01-01 RX ADMIN — Medication 2 PUFF: at 20:19

## 2021-01-01 RX ADMIN — INSULIN LISPRO 18 UNITS: 100 INJECTION, SOLUTION INTRAVENOUS; SUBCUTANEOUS at 01:58

## 2021-01-01 RX ADMIN — VORICONAZOLE 200 MG: 200 TABLET ORAL at 08:49

## 2021-01-01 RX ADMIN — FLUTICASONE PROPIONATE 2 PUFF: 220 AEROSOL, METERED RESPIRATORY (INHALATION) at 09:14

## 2021-01-01 RX ADMIN — CARVEDILOL 3.12 MG: 6.25 TABLET, FILM COATED ORAL at 09:04

## 2021-01-01 RX ADMIN — LOSARTAN POTASSIUM 100 MG: 100 TABLET, FILM COATED ORAL at 09:17

## 2021-01-01 RX ADMIN — INSULIN GLARGINE 43 UNITS: 100 INJECTION, SOLUTION SUBCUTANEOUS at 21:48

## 2021-01-01 RX ADMIN — DEXAMETHASONE SODIUM PHOSPHATE 10 MG: 10 INJECTION INTRAMUSCULAR; INTRAVENOUS at 08:59

## 2021-01-01 RX ADMIN — INSULIN LISPRO 9 UNITS: 100 INJECTION, SOLUTION INTRAVENOUS; SUBCUTANEOUS at 13:02

## 2021-01-01 RX ADMIN — ASPIRIN 81 MG CHEWABLE TABLET 81 MG: 81 TABLET CHEWABLE at 07:55

## 2021-01-01 RX ADMIN — INSULIN LISPRO 3 UNITS: 100 INJECTION, SOLUTION INTRAVENOUS; SUBCUTANEOUS at 12:50

## 2021-01-01 RX ADMIN — INSULIN LISPRO 6 UNITS: 100 INJECTION, SOLUTION INTRAVENOUS; SUBCUTANEOUS at 12:34

## 2021-01-01 RX ADMIN — INSULIN LISPRO 6 UNITS: 100 INJECTION, SOLUTION INTRAVENOUS; SUBCUTANEOUS at 16:16

## 2021-01-01 RX ADMIN — GUAIFENESIN SYRUP AND DEXTROMETHORPHAN 5 ML: 100; 10 SYRUP ORAL at 14:17

## 2021-01-01 RX ADMIN — GUAIFENESIN SYRUP AND DEXTROMETHORPHAN 5 ML: 100; 10 SYRUP ORAL at 21:30

## 2021-01-01 RX ADMIN — PREGABALIN 200 MG: 100 CAPSULE ORAL at 22:02

## 2021-01-01 RX ADMIN — FLUTICASONE PROPIONATE 2 PUFF: 220 AEROSOL, METERED RESPIRATORY (INHALATION) at 20:19

## 2021-01-01 RX ADMIN — FLUTICASONE PROPIONATE 2 PUFF: 220 AEROSOL, METERED RESPIRATORY (INHALATION) at 07:35

## 2021-01-01 RX ADMIN — SODIUM BICARBONATE: 84 INJECTION, SOLUTION INTRAVENOUS at 14:02

## 2021-01-01 RX ADMIN — INSULIN LISPRO 25 UNITS: 100 INJECTION, SOLUTION INTRAVENOUS; SUBCUTANEOUS at 12:33

## 2021-01-01 RX ADMIN — ENOXAPARIN SODIUM 30 MG: 30 INJECTION SUBCUTANEOUS at 09:31

## 2021-01-01 RX ADMIN — INSULIN LISPRO 12 UNITS: 100 INJECTION, SOLUTION INTRAVENOUS; SUBCUTANEOUS at 12:35

## 2021-01-01 RX ADMIN — PREGABALIN 200 MG: 100 CAPSULE ORAL at 21:47

## 2021-01-01 RX ADMIN — Medication 10 MG: at 23:15

## 2021-01-01 RX ADMIN — WHITE PETROLATUM 57.7 %-MINERAL OIL 31.9 % EYE OINTMENT: at 16:18

## 2021-01-01 RX ADMIN — INSULIN LISPRO 15 UNITS: 100 INJECTION, SOLUTION INTRAVENOUS; SUBCUTANEOUS at 18:02

## 2021-01-01 RX ADMIN — DEXAMETHASONE SODIUM PHOSPHATE 6 MG: 10 INJECTION INTRAMUSCULAR; INTRAVENOUS at 08:58

## 2021-01-01 RX ADMIN — ASPIRIN 81 MG CHEWABLE TABLET 81 MG: 81 TABLET CHEWABLE at 09:17

## 2021-01-01 RX ADMIN — FLUTICASONE PROPIONATE 2 PUFF: 220 AEROSOL, METERED RESPIRATORY (INHALATION) at 08:43

## 2021-01-01 RX ADMIN — ENOXAPARIN SODIUM 30 MG: 30 INJECTION SUBCUTANEOUS at 20:35

## 2021-01-01 RX ADMIN — GUAIFENESIN SYRUP AND DEXTROMETHORPHAN 5 ML: 100; 10 SYRUP ORAL at 06:55

## 2021-01-01 RX ADMIN — VORICONAZOLE 200 MG: 200 TABLET ORAL at 23:09

## 2021-01-01 RX ADMIN — FLUTICASONE PROPIONATE 2 PUFF: 220 AEROSOL, METERED RESPIRATORY (INHALATION) at 21:09

## 2021-01-01 RX ADMIN — INSULIN LISPRO 12 UNITS: 100 INJECTION, SOLUTION INTRAVENOUS; SUBCUTANEOUS at 16:49

## 2021-01-01 RX ADMIN — PREGABALIN 200 MG: 100 CAPSULE ORAL at 09:32

## 2021-01-01 RX ADMIN — Medication 3 MG: at 16:44

## 2021-01-01 RX ADMIN — HEPARIN SODIUM 5000 UNITS: 5000 INJECTION INTRAVENOUS; SUBCUTANEOUS at 15:00

## 2021-01-01 RX ADMIN — Medication 20 MCG/MIN: at 14:39

## 2021-01-01 RX ADMIN — PREGABALIN 200 MG: 100 CAPSULE ORAL at 20:36

## 2021-01-01 RX ADMIN — WHITE PETROLATUM 57.7 %-MINERAL OIL 31.9 % EYE OINTMENT: at 16:00

## 2021-01-01 RX ADMIN — INSULIN LISPRO 25 UNITS: 100 INJECTION, SOLUTION INTRAVENOUS; SUBCUTANEOUS at 17:02

## 2021-01-01 RX ADMIN — ASPIRIN 81 MG CHEWABLE TABLET 81 MG: 81 TABLET CHEWABLE at 08:59

## 2021-01-01 RX ADMIN — WHITE PETROLATUM 57.7 %-MINERAL OIL 31.9 % EYE OINTMENT: at 16:07

## 2021-01-01 RX ADMIN — ACETAMINOPHEN 650 MG: 325 TABLET ORAL at 05:36

## 2021-01-01 RX ADMIN — FAMOTIDINE 20 MG: 10 INJECTION, SOLUTION INTRAVENOUS at 08:42

## 2021-01-01 RX ADMIN — Medication 3 MG: at 06:09

## 2021-01-01 RX ADMIN — SODIUM CHLORIDE: 9 INJECTION, SOLUTION INTRAVENOUS at 04:43

## 2021-01-01 RX ADMIN — FUROSEMIDE 80 MG: 10 INJECTION, SOLUTION INTRAMUSCULAR; INTRAVENOUS at 09:30

## 2021-01-01 RX ADMIN — MIDAZOLAM 6 MG/HR: 5 INJECTION INTRAMUSCULAR; INTRAVENOUS at 08:56

## 2021-01-01 RX ADMIN — REMDESIVIR 100 MG: 5 INJECTION INTRAVENOUS at 12:45

## 2021-01-01 RX ADMIN — INSULIN LISPRO 35 UNITS: 100 INJECTION, SOLUTION INTRAVENOUS; SUBCUTANEOUS at 12:50

## 2021-01-01 RX ADMIN — WHITE PETROLATUM 57.7 %-MINERAL OIL 31.9 % EYE OINTMENT: at 23:52

## 2021-01-01 RX ADMIN — PROPOFOL 30 MCG/KG/MIN: 10 INJECTION, EMULSION INTRAVENOUS at 13:08

## 2021-01-01 RX ADMIN — TRAZODONE HYDROCHLORIDE 50 MG: 50 TABLET ORAL at 20:35

## 2021-01-01 RX ADMIN — INSULIN LISPRO 9 UNITS: 100 INJECTION, SOLUTION INTRAVENOUS; SUBCUTANEOUS at 18:11

## 2021-01-01 RX ADMIN — INSULIN LISPRO 6 UNITS: 100 INJECTION, SOLUTION INTRAVENOUS; SUBCUTANEOUS at 18:03

## 2021-01-01 RX ADMIN — INSULIN LISPRO 25 UNITS: 100 INJECTION, SOLUTION INTRAVENOUS; SUBCUTANEOUS at 08:25

## 2021-01-01 RX ADMIN — SODIUM CHLORIDE, PRESERVATIVE FREE 10 ML: 5 INJECTION INTRAVENOUS at 21:06

## 2021-01-01 RX ADMIN — PROPOFOL 30 MCG/KG/MIN: 10 INJECTION, EMULSION INTRAVENOUS at 18:23

## 2021-01-01 RX ADMIN — INSULIN GLARGINE 60 UNITS: 100 INJECTION, SOLUTION SUBCUTANEOUS at 08:48

## 2021-01-01 RX ADMIN — Medication 10 MG: at 13:02

## 2021-01-01 RX ADMIN — FLUTICASONE PROPIONATE 2 SPRAY: 50 SPRAY, METERED NASAL at 09:31

## 2021-01-01 RX ADMIN — FLUTICASONE PROPIONATE 2 PUFF: 220 AEROSOL, METERED RESPIRATORY (INHALATION) at 20:24

## 2021-01-01 RX ADMIN — TRAZODONE HYDROCHLORIDE 50 MG: 50 TABLET ORAL at 22:56

## 2021-01-01 RX ADMIN — PREGABALIN 200 MG: 100 CAPSULE ORAL at 08:59

## 2021-01-01 RX ADMIN — WHITE PETROLATUM 57.7 %-MINERAL OIL 31.9 % EYE OINTMENT: at 20:49

## 2021-01-01 RX ADMIN — BARICITINIB 4 MG: 2 TABLET, FILM COATED ORAL at 13:18

## 2021-01-01 RX ADMIN — FLUTICASONE PROPIONATE 2 SPRAY: 50 SPRAY, METERED NASAL at 09:17

## 2021-01-01 RX ADMIN — DEXAMETHASONE SODIUM PHOSPHATE 10 MG: 10 INJECTION INTRAMUSCULAR; INTRAVENOUS at 09:30

## 2021-01-01 RX ADMIN — BARICITINIB 2 MG: 2 TABLET, FILM COATED ORAL at 13:50

## 2021-01-01 RX ADMIN — LOSARTAN POTASSIUM 100 MG: 100 TABLET, FILM COATED ORAL at 08:59

## 2021-01-01 RX ADMIN — INSULIN LISPRO 12 UNITS: 100 INJECTION, SOLUTION INTRAVENOUS; SUBCUTANEOUS at 12:47

## 2021-01-01 RX ADMIN — INSULIN LISPRO 25 UNITS: 100 INJECTION, SOLUTION INTRAVENOUS; SUBCUTANEOUS at 13:18

## 2021-01-01 RX ADMIN — INSULIN LISPRO 18 UNITS: 100 INJECTION, SOLUTION INTRAVENOUS; SUBCUTANEOUS at 12:36

## 2021-01-01 RX ADMIN — ENOXAPARIN SODIUM 30 MG: 30 INJECTION SUBCUTANEOUS at 21:21

## 2021-01-01 RX ADMIN — PROPOFOL 50 MCG/KG/MIN: 10 INJECTION, EMULSION INTRAVENOUS at 14:40

## 2021-01-01 RX ADMIN — TRAZODONE HYDROCHLORIDE 50 MG: 50 TABLET ORAL at 21:48

## 2021-01-01 RX ADMIN — Medication 3 MG: at 23:31

## 2021-01-01 RX ADMIN — INSULIN GLARGINE 25 UNITS: 100 INJECTION, SOLUTION SUBCUTANEOUS at 20:35

## 2021-01-01 RX ADMIN — LORAZEPAM 1 MG: 2 INJECTION INTRAMUSCULAR; INTRAVENOUS at 06:26

## 2021-01-01 RX ADMIN — ASPIRIN 81 MG CHEWABLE TABLET 81 MG: 81 TABLET CHEWABLE at 08:47

## 2021-01-01 RX ADMIN — CISATRACURIUM BESYLATE 3 MCG/KG/MIN: 200 INJECTION INTRAVENOUS at 20:56

## 2021-01-01 RX ADMIN — FLUTICASONE PROPIONATE 2 PUFF: 220 AEROSOL, METERED RESPIRATORY (INHALATION) at 21:06

## 2021-01-01 RX ADMIN — INSULIN GLARGINE 60 UNITS: 100 INJECTION, SOLUTION SUBCUTANEOUS at 21:24

## 2021-01-01 RX ADMIN — DEXAMETHASONE SODIUM PHOSPHATE 6 MG: 4 INJECTION, SOLUTION INTRAMUSCULAR; INTRAVENOUS at 10:56

## 2021-01-01 RX ADMIN — PREGABALIN 200 MG: 100 CAPSULE ORAL at 08:43

## 2021-01-01 RX ADMIN — HEPARIN SODIUM 5000 UNITS: 5000 INJECTION INTRAVENOUS; SUBCUTANEOUS at 22:13

## 2021-01-01 RX ADMIN — INSULIN GLARGINE 25 UNITS: 100 INJECTION, SOLUTION SUBCUTANEOUS at 22:55

## 2021-01-01 RX ADMIN — FLUTICASONE PROPIONATE 2 SPRAY: 50 SPRAY, METERED NASAL at 09:00

## 2021-01-01 RX ADMIN — SODIUM BICARBONATE: 84 INJECTION, SOLUTION INTRAVENOUS at 12:38

## 2021-01-01 RX ADMIN — INSULIN HUMAN 8 UNITS: 100 INJECTION, SOLUTION PARENTERAL at 02:38

## 2021-01-01 RX ADMIN — SODIUM CHLORIDE, PRESERVATIVE FREE 10 ML: 5 INJECTION INTRAVENOUS at 21:00

## 2021-01-01 RX ADMIN — Medication 2 PUFF: at 19:54

## 2021-01-01 RX ADMIN — GUAIFENESIN SYRUP AND DEXTROMETHORPHAN 5 ML: 100; 10 SYRUP ORAL at 21:06

## 2021-01-01 RX ADMIN — PROPOFOL 30 MCG/KG/MIN: 10 INJECTION, EMULSION INTRAVENOUS at 06:09

## 2021-01-01 RX ADMIN — FLUTICASONE PROPIONATE 2 SPRAY: 50 SPRAY, METERED NASAL at 08:46

## 2021-01-01 RX ADMIN — INSULIN LISPRO 6 UNITS: 100 INJECTION, SOLUTION INTRAVENOUS; SUBCUTANEOUS at 13:11

## 2021-01-01 RX ADMIN — PREGABALIN 200 MG: 100 CAPSULE ORAL at 21:48

## 2021-01-01 RX ADMIN — ENOXAPARIN SODIUM 30 MG: 30 INJECTION SUBCUTANEOUS at 21:39

## 2021-01-01 RX ADMIN — INSULIN LISPRO 30 UNITS: 100 INJECTION, SOLUTION INTRAVENOUS; SUBCUTANEOUS at 09:01

## 2021-01-01 RX ADMIN — TRAZODONE HYDROCHLORIDE 50 MG: 50 TABLET ORAL at 20:37

## 2021-01-01 RX ADMIN — DEXAMETHASONE SODIUM PHOSPHATE 20 MG: 4 INJECTION, SOLUTION INTRAMUSCULAR; INTRAVENOUS at 10:21

## 2021-01-01 RX ADMIN — SODIUM CHLORIDE, PRESERVATIVE FREE 10 ML: 5 INJECTION INTRAVENOUS at 16:14

## 2021-01-01 RX ADMIN — INSULIN LISPRO 7 UNITS: 100 INJECTION, SOLUTION INTRAVENOUS; SUBCUTANEOUS at 21:40

## 2021-01-01 RX ADMIN — BARICITINIB 4 MG: 2 TABLET, FILM COATED ORAL at 13:11

## 2021-01-01 RX ADMIN — MAGNESIUM SULFATE HEPTAHYDRATE 2000 MG: 40 INJECTION, SOLUTION INTRAVENOUS at 09:32

## 2021-01-01 RX ADMIN — SODIUM CHLORIDE, PRESERVATIVE FREE 10 ML: 5 INJECTION INTRAVENOUS at 08:22

## 2021-01-01 RX ADMIN — SODIUM BICARBONATE: 84 INJECTION, SOLUTION INTRAVENOUS at 01:52

## 2021-01-01 RX ADMIN — SODIUM CHLORIDE, PRESERVATIVE FREE 10 ML: 5 INJECTION INTRAVENOUS at 22:03

## 2021-01-01 RX ADMIN — PROPOFOL 30 MCG/KG/MIN: 10 INJECTION, EMULSION INTRAVENOUS at 15:53

## 2021-01-01 RX ADMIN — PREGABALIN 200 MG: 100 CAPSULE ORAL at 21:35

## 2021-01-01 RX ADMIN — WHITE PETROLATUM 57.7 %-MINERAL OIL 31.9 % EYE OINTMENT: at 07:42

## 2021-01-01 RX ADMIN — Medication 10 MG: at 12:41

## 2021-01-01 RX ADMIN — SODIUM CHLORIDE, PRESERVATIVE FREE 10 ML: 5 INJECTION INTRAVENOUS at 08:43

## 2021-01-01 RX ADMIN — INSULIN LISPRO 18 UNITS: 100 INJECTION, SOLUTION INTRAVENOUS; SUBCUTANEOUS at 17:30

## 2021-01-01 RX ADMIN — GUAIFENESIN SYRUP AND DEXTROMETHORPHAN 5 ML: 100; 10 SYRUP ORAL at 21:24

## 2021-01-01 RX ADMIN — LOSARTAN POTASSIUM 100 MG: 100 TABLET, FILM COATED ORAL at 09:30

## 2021-01-01 RX ADMIN — INSULIN LISPRO 25 UNITS: 100 INJECTION, SOLUTION INTRAVENOUS; SUBCUTANEOUS at 13:03

## 2021-01-01 RX ADMIN — CARVEDILOL 3.12 MG: 6.25 TABLET, FILM COATED ORAL at 08:57

## 2021-01-01 RX ADMIN — ENOXAPARIN SODIUM 30 MG: 30 INJECTION SUBCUTANEOUS at 21:30

## 2021-01-01 RX ADMIN — ALUMINUM HYDROXIDE, MAGNESIUM HYDROXIDE, AND SIMETHICONE: 200; 200; 20 SUSPENSION ORAL at 17:11

## 2021-01-01 RX ADMIN — GUAIFENESIN SYRUP AND DEXTROMETHORPHAN 5 ML: 100; 10 SYRUP ORAL at 16:15

## 2021-01-01 RX ADMIN — ACETAMINOPHEN 650 MG: 325 TABLET ORAL at 06:55

## 2021-01-01 RX ADMIN — Medication 2 PUFF: at 09:35

## 2021-01-01 RX ADMIN — Medication 10 MG: at 06:09

## 2021-01-01 RX ADMIN — EPINEPHRINE 1 MCG/MIN: 1 INJECTION INTRAMUSCULAR; INTRAVENOUS; SUBCUTANEOUS at 18:55

## 2021-01-01 RX ADMIN — Medication 10 MG: at 16:06

## 2021-01-01 RX ADMIN — SODIUM CHLORIDE, PRESERVATIVE FREE 10 ML: 5 INJECTION INTRAVENOUS at 09:18

## 2021-01-01 RX ADMIN — BARICITINIB 4 MG: 2 TABLET, FILM COATED ORAL at 12:44

## 2021-01-01 RX ADMIN — ENOXAPARIN SODIUM 30 MG: 30 INJECTION SUBCUTANEOUS at 09:18

## 2021-01-01 RX ADMIN — PREGABALIN 200 MG: 100 CAPSULE ORAL at 08:45

## 2021-01-01 RX ADMIN — LOSARTAN POTASSIUM 100 MG: 100 TABLET, FILM COATED ORAL at 08:42

## 2021-01-01 RX ADMIN — VASOPRESSIN 0.04 UNITS/MIN: 20 INJECTION INTRAVENOUS at 04:23

## 2021-01-01 RX ADMIN — MAGNESIUM SULFATE HEPTAHYDRATE 1000 MG: 1 INJECTION, SOLUTION INTRAVENOUS at 12:48

## 2021-01-01 RX ADMIN — VASOPRESSIN 0.04 UNITS/MIN: 20 INJECTION INTRAVENOUS at 00:05

## 2021-01-01 RX ADMIN — CARVEDILOL 3.12 MG: 6.25 TABLET, FILM COATED ORAL at 08:47

## 2021-01-01 RX ADMIN — WHITE PETROLATUM 57.7 %-MINERAL OIL 31.9 % EYE OINTMENT: at 22:26

## 2021-01-01 RX ADMIN — SODIUM POLYSTYRENE SULFONATE 30 G: 15 SUSPENSION ORAL; RECTAL at 06:47

## 2021-01-01 RX ADMIN — DEXAMETHASONE SODIUM PHOSPHATE 10 MG: 10 INJECTION INTRAMUSCULAR; INTRAVENOUS at 09:04

## 2021-01-01 RX ADMIN — ENOXAPARIN SODIUM 30 MG: 30 INJECTION SUBCUTANEOUS at 08:43

## 2021-01-01 RX ADMIN — VORICONAZOLE 200 MG: 200 TABLET ORAL at 09:01

## 2021-01-01 RX ADMIN — Medication 3 MG: at 23:26

## 2021-01-01 RX ADMIN — TRAZODONE HYDROCHLORIDE 50 MG: 50 TABLET ORAL at 22:13

## 2021-01-01 RX ADMIN — SODIUM BICARBONATE 50 MEQ: 84 INJECTION, SOLUTION INTRAVENOUS at 06:47

## 2021-01-01 RX ADMIN — CARVEDILOL 3.12 MG: 6.25 TABLET, FILM COATED ORAL at 09:18

## 2021-01-01 RX ADMIN — CARVEDILOL 3.12 MG: 6.25 TABLET, FILM COATED ORAL at 13:47

## 2021-01-01 RX ADMIN — LOSARTAN POTASSIUM 100 MG: 100 TABLET, FILM COATED ORAL at 09:25

## 2021-01-01 RX ADMIN — VORICONAZOLE 200 MG: 200 TABLET ORAL at 09:38

## 2021-01-01 RX ADMIN — INSULIN LISPRO 25 UNITS: 100 INJECTION, SOLUTION INTRAVENOUS; SUBCUTANEOUS at 08:47

## 2021-01-01 RX ADMIN — CISATRACURIUM BESYLATE 2 MCG/KG/MIN: 200 INJECTION INTRAVENOUS at 17:53

## 2021-01-01 RX ADMIN — VORICONAZOLE 200 MG: 200 TABLET ORAL at 08:58

## 2021-01-01 RX ADMIN — PHENYLEPHRINE HYDROCHLORIDE 75 MCG/MIN: 10 INJECTION INTRAVENOUS at 10:24

## 2021-01-01 RX ADMIN — METOCLOPRAMIDE HYDROCHLORIDE 5 MG: 5 INJECTION INTRAMUSCULAR; INTRAVENOUS at 15:06

## 2021-01-01 RX ADMIN — Medication 1000 ML: at 14:21

## 2021-01-01 RX ADMIN — MAGNESIUM SULFATE HEPTAHYDRATE 1000 MG: 1 INJECTION, SOLUTION INTRAVENOUS at 15:30

## 2021-01-01 RX ADMIN — MIDAZOLAM 4 MG/HR: 5 INJECTION INTRAMUSCULAR; INTRAVENOUS at 15:18

## 2021-01-01 RX ADMIN — INSULIN LISPRO 9 UNITS: 100 INJECTION, SOLUTION INTRAVENOUS; SUBCUTANEOUS at 21:24

## 2021-01-01 RX ADMIN — FAMOTIDINE 20 MG: 10 INJECTION, SOLUTION INTRAVENOUS at 07:55

## 2021-01-01 RX ADMIN — FLUTICASONE PROPIONATE 2 SPRAY: 50 SPRAY, METERED NASAL at 16:16

## 2021-01-01 RX ADMIN — VASOPRESSIN 0.04 UNITS/MIN: 20 INJECTION INTRAVENOUS at 18:28

## 2021-01-01 RX ADMIN — INSULIN LISPRO 18 UNITS: 100 INJECTION, SOLUTION INTRAVENOUS; SUBCUTANEOUS at 09:20

## 2021-01-01 RX ADMIN — VORICONAZOLE 200 MG: 200 TABLET ORAL at 21:46

## 2021-01-01 RX ADMIN — CARVEDILOL 3.12 MG: 6.25 TABLET, FILM COATED ORAL at 09:38

## 2021-01-01 RX ADMIN — INSULIN LISPRO 3 UNITS: 100 INJECTION, SOLUTION INTRAVENOUS; SUBCUTANEOUS at 21:37

## 2021-01-01 RX ADMIN — ENOXAPARIN SODIUM 30 MG: 30 INJECTION SUBCUTANEOUS at 21:05

## 2021-01-01 RX ADMIN — FUROSEMIDE 80 MG: 10 INJECTION, SOLUTION INTRAMUSCULAR; INTRAVENOUS at 06:46

## 2021-01-01 RX ADMIN — INSULIN LISPRO 15 UNITS: 100 INJECTION, SOLUTION INTRAVENOUS; SUBCUTANEOUS at 09:27

## 2021-01-01 RX ADMIN — INSULIN HUMAN 10 UNITS: 100 INJECTION, SOLUTION PARENTERAL at 14:38

## 2021-01-01 RX ADMIN — ENOXAPARIN SODIUM 30 MG: 30 INJECTION SUBCUTANEOUS at 10:38

## 2021-01-01 RX ADMIN — FAMOTIDINE 20 MG: 10 INJECTION, SOLUTION INTRAVENOUS at 14:39

## 2021-01-01 RX ADMIN — SODIUM CHLORIDE, PRESERVATIVE FREE 10 ML: 5 INJECTION INTRAVENOUS at 21:36

## 2021-01-01 RX ADMIN — FLUTICASONE PROPIONATE 2 PUFF: 220 AEROSOL, METERED RESPIRATORY (INHALATION) at 07:48

## 2021-01-01 RX ADMIN — Medication 3 MG: at 06:15

## 2021-01-01 RX ADMIN — DEXAMETHASONE SODIUM PHOSPHATE 20 MG: 4 INJECTION, SOLUTION INTRAMUSCULAR; INTRAVENOUS at 09:44

## 2021-01-01 RX ADMIN — SODIUM CHLORIDE, PRESERVATIVE FREE 10 ML: 5 INJECTION INTRAVENOUS at 22:56

## 2021-01-01 RX ADMIN — INSULIN LISPRO 25 UNITS: 100 INJECTION, SOLUTION INTRAVENOUS; SUBCUTANEOUS at 17:30

## 2021-01-01 RX ADMIN — INSULIN LISPRO 35 UNITS: 100 INJECTION, SOLUTION INTRAVENOUS; SUBCUTANEOUS at 09:37

## 2021-01-01 RX ADMIN — GUAIFENESIN SYRUP AND DEXTROMETHORPHAN 5 ML: 100; 10 SYRUP ORAL at 22:07

## 2021-01-01 RX ADMIN — LOSARTAN POTASSIUM 100 MG: 100 TABLET, FILM COATED ORAL at 08:58

## 2021-01-01 RX ADMIN — INSULIN HUMAN 8.76 UNITS/HR: 1 INJECTION, SOLUTION INTRAVENOUS at 04:47

## 2021-01-01 RX ADMIN — PHENYLEPHRINE HYDROCHLORIDE 50 MCG/MIN: 10 INJECTION INTRAVENOUS at 18:54

## 2021-01-01 RX ADMIN — ENOXAPARIN SODIUM 30 MG: 30 INJECTION SUBCUTANEOUS at 22:03

## 2021-01-01 RX ADMIN — WHITE PETROLATUM 57.7 %-MINERAL OIL 31.9 % EYE OINTMENT: at 04:00

## 2021-01-01 RX ADMIN — SODIUM CHLORIDE, PRESERVATIVE FREE 10 ML: 5 INJECTION INTRAVENOUS at 08:17

## 2021-01-01 RX ADMIN — PREGABALIN 200 MG: 100 CAPSULE ORAL at 21:29

## 2021-01-01 RX ADMIN — SODIUM CHLORIDE, PRESERVATIVE FREE 10 ML: 5 INJECTION INTRAVENOUS at 09:30

## 2021-01-01 RX ADMIN — GUAIFENESIN SYRUP AND DEXTROMETHORPHAN 5 ML: 100; 10 SYRUP ORAL at 12:26

## 2021-01-01 RX ADMIN — TRAZODONE HYDROCHLORIDE 50 MG: 50 TABLET ORAL at 21:21

## 2021-01-01 RX ADMIN — HEPARIN SODIUM 5000 UNITS: 5000 INJECTION INTRAVENOUS; SUBCUTANEOUS at 14:16

## 2021-01-01 RX ADMIN — WHITE PETROLATUM 57.7 %-MINERAL OIL 31.9 % EYE OINTMENT: at 03:46

## 2021-01-01 RX ADMIN — PREGABALIN 200 MG: 100 CAPSULE ORAL at 08:35

## 2021-01-01 RX ADMIN — INSULIN LISPRO 2 UNITS: 100 INJECTION, SOLUTION INTRAVENOUS; SUBCUTANEOUS at 21:05

## 2021-01-01 RX ADMIN — CARVEDILOL 3.12 MG: 6.25 TABLET, FILM COATED ORAL at 09:32

## 2021-01-01 RX ADMIN — ATORVASTATIN CALCIUM 10 MG: 10 TABLET, FILM COATED ORAL at 09:04

## 2021-01-01 RX ADMIN — Medication 10 MG: at 02:51

## 2021-01-01 RX ADMIN — INSULIN GLARGINE 50 UNITS: 100 INJECTION, SOLUTION SUBCUTANEOUS at 22:04

## 2021-01-01 RX ADMIN — ATORVASTATIN CALCIUM 10 MG: 10 TABLET, FILM COATED ORAL at 09:32

## 2021-01-01 RX ADMIN — MORPHINE SULFATE 2 MG: 2 INJECTION, SOLUTION INTRAMUSCULAR; INTRAVENOUS at 04:26

## 2021-01-01 RX ADMIN — INSULIN LISPRO 30 UNITS: 100 INJECTION, SOLUTION INTRAVENOUS; SUBCUTANEOUS at 17:46

## 2021-01-01 RX ADMIN — ATORVASTATIN CALCIUM 10 MG: 10 TABLET, FILM COATED ORAL at 08:47

## 2021-01-01 RX ADMIN — TRAZODONE HYDROCHLORIDE 50 MG: 50 TABLET ORAL at 21:35

## 2021-01-01 RX ADMIN — Medication 2 PUFF: at 09:36

## 2021-01-01 RX ADMIN — LOSARTAN POTASSIUM 100 MG: 100 TABLET, FILM COATED ORAL at 09:39

## 2021-01-01 RX ADMIN — INSULIN LISPRO 15 UNITS: 100 INJECTION, SOLUTION INTRAVENOUS; SUBCUTANEOUS at 12:46

## 2021-01-01 RX ADMIN — VORICONAZOLE 200 MG: 200 TABLET ORAL at 09:33

## 2021-01-01 RX ADMIN — PROPOFOL 30 MCG/KG/MIN: 10 INJECTION, EMULSION INTRAVENOUS at 04:30

## 2021-01-01 RX ADMIN — INSULIN LISPRO 30 UNITS: 100 INJECTION, SOLUTION INTRAVENOUS; SUBCUTANEOUS at 17:53

## 2021-01-01 RX ADMIN — Medication 100 MCG/MIN: at 19:09

## 2021-01-01 RX ADMIN — INSULIN GLARGINE 25 UNITS: 100 INJECTION, SOLUTION SUBCUTANEOUS at 22:07

## 2021-01-01 RX ADMIN — Medication 10 MG: at 14:16

## 2021-01-01 RX ADMIN — INSULIN LISPRO 30 UNITS: 100 INJECTION, SOLUTION INTRAVENOUS; SUBCUTANEOUS at 18:34

## 2021-01-01 RX ADMIN — CARVEDILOL 3.12 MG: 6.25 TABLET, FILM COATED ORAL at 08:49

## 2021-01-01 RX ADMIN — FLUTICASONE PROPIONATE 2 PUFF: 220 AEROSOL, METERED RESPIRATORY (INHALATION) at 09:34

## 2021-01-01 RX ADMIN — MORPHINE SULFATE 2 MG: 2 INJECTION, SOLUTION INTRAMUSCULAR; INTRAVENOUS at 21:26

## 2021-01-01 RX ADMIN — VASOPRESSIN 0.04 UNITS/MIN: 20 INJECTION INTRAVENOUS at 21:50

## 2021-01-01 RX ADMIN — FLUTICASONE PROPIONATE 2 PUFF: 220 AEROSOL, METERED RESPIRATORY (INHALATION) at 09:36

## 2021-01-01 RX ADMIN — ENOXAPARIN SODIUM 30 MG: 30 INJECTION SUBCUTANEOUS at 20:55

## 2021-01-01 RX ADMIN — GUAIFENESIN SYRUP AND DEXTROMETHORPHAN 5 ML: 100; 10 SYRUP ORAL at 22:56

## 2021-01-01 RX ADMIN — CHLORHEXIDINE GLUCONATE 15 ML: 1.2 RINSE ORAL at 20:37

## 2021-01-01 RX ADMIN — ATORVASTATIN CALCIUM 10 MG: 10 TABLET, FILM COATED ORAL at 09:31

## 2021-01-01 RX ADMIN — ENOXAPARIN SODIUM 30 MG: 30 INJECTION SUBCUTANEOUS at 09:39

## 2021-01-01 RX ADMIN — SODIUM BICARBONATE: 84 INJECTION, SOLUTION INTRAVENOUS at 01:28

## 2021-01-01 RX ADMIN — ATORVASTATIN CALCIUM 10 MG: 10 TABLET, FILM COATED ORAL at 08:49

## 2021-01-01 RX ADMIN — INSULIN LISPRO 25 UNITS: 100 INJECTION, SOLUTION INTRAVENOUS; SUBCUTANEOUS at 18:00

## 2021-01-01 RX ADMIN — ENOXAPARIN SODIUM 30 MG: 30 INJECTION SUBCUTANEOUS at 09:03

## 2021-01-01 RX ADMIN — BARICITINIB 4 MG: 2 TABLET, FILM COATED ORAL at 12:34

## 2021-01-01 RX ADMIN — CEFEPIME HYDROCHLORIDE 1000 MG: 1 INJECTION, POWDER, FOR SOLUTION INTRAMUSCULAR; INTRAVENOUS at 14:58

## 2021-01-01 RX ADMIN — Medication 10 MG: at 09:37

## 2021-01-01 RX ADMIN — DEXAMETHASONE SODIUM PHOSPHATE 6 MG: 4 INJECTION, SOLUTION INTRAMUSCULAR; INTRAVENOUS at 12:40

## 2021-01-01 RX ADMIN — SODIUM CHLORIDE, PRESERVATIVE FREE 10 ML: 5 INJECTION INTRAVENOUS at 21:21

## 2021-01-01 RX ADMIN — SODIUM CHLORIDE, PRESERVATIVE FREE 10 ML: 5 INJECTION INTRAVENOUS at 08:49

## 2021-01-01 RX ADMIN — INSULIN GLARGINE 25 UNITS: 100 INJECTION, SOLUTION SUBCUTANEOUS at 20:37

## 2021-01-01 RX ADMIN — GUAIFENESIN SYRUP AND DEXTROMETHORPHAN 5 ML: 100; 10 SYRUP ORAL at 08:47

## 2021-01-01 RX ADMIN — INSULIN GLARGINE 60 UNITS: 100 INJECTION, SOLUTION SUBCUTANEOUS at 09:01

## 2021-01-01 RX ADMIN — INSULIN HUMAN 20 UNITS: 100 INJECTION, SOLUTION PARENTERAL at 22:38

## 2021-01-01 RX ADMIN — CHLORHEXIDINE GLUCONATE 15 ML: 1.2 RINSE ORAL at 07:57

## 2021-01-01 RX ADMIN — Medication 1000 ML: at 13:17

## 2021-01-01 RX ADMIN — INSULIN LISPRO 30 UNITS: 100 INJECTION, SOLUTION INTRAVENOUS; SUBCUTANEOUS at 12:33

## 2021-01-01 RX ADMIN — INSULIN HUMAN 10.64 UNITS/HR: 1 INJECTION, SOLUTION INTRAVENOUS at 13:24

## 2021-01-01 RX ADMIN — ENOXAPARIN SODIUM 30 MG: 30 INJECTION SUBCUTANEOUS at 08:49

## 2021-01-01 RX ADMIN — ENOXAPARIN SODIUM 30 MG: 30 INJECTION SUBCUTANEOUS at 21:17

## 2021-01-01 RX ADMIN — VASOPRESSIN 0.04 UNITS/MIN: 20 INJECTION INTRAVENOUS at 08:26

## 2021-01-01 RX ADMIN — PHENYLEPHRINE HYDROCHLORIDE 150 MCG/KG/MIN: 10 INJECTION INTRAVENOUS at 01:31

## 2021-01-01 RX ADMIN — INSULIN LISPRO 3 UNITS: 100 INJECTION, SOLUTION INTRAVENOUS; SUBCUTANEOUS at 08:25

## 2021-01-01 RX ADMIN — ACETAMINOPHEN 650 MG: 650 SUPPOSITORY RECTAL at 09:21

## 2021-01-01 RX ADMIN — Medication 10 MG: at 09:22

## 2021-01-01 RX ADMIN — ATORVASTATIN CALCIUM 10 MG: 10 TABLET, FILM COATED ORAL at 09:25

## 2021-01-01 RX ADMIN — ATORVASTATIN CALCIUM 10 MG: 10 TABLET, FILM COATED ORAL at 08:58

## 2021-01-01 RX ADMIN — ATORVASTATIN CALCIUM 10 MG: 10 TABLET, FILM COATED ORAL at 08:46

## 2021-01-01 RX ADMIN — TRAZODONE HYDROCHLORIDE 50 MG: 50 TABLET ORAL at 22:02

## 2021-01-01 RX ADMIN — WHITE PETROLATUM 57.7 %-MINERAL OIL 31.9 % EYE OINTMENT: at 12:05

## 2021-01-01 RX ADMIN — CARVEDILOL 3.12 MG: 6.25 TABLET, FILM COATED ORAL at 08:45

## 2021-01-01 RX ADMIN — FLUTICASONE PROPIONATE 2 SPRAY: 50 SPRAY, METERED NASAL at 09:06

## 2021-01-01 RX ADMIN — INSULIN LISPRO 18 UNITS: 100 INJECTION, SOLUTION INTRAVENOUS; SUBCUTANEOUS at 17:53

## 2021-01-01 RX ADMIN — ONDANSETRON 4 MG: 2 INJECTION INTRAMUSCULAR; INTRAVENOUS at 14:39

## 2021-01-01 RX ADMIN — CISATRACURIUM BESYLATE 4 MCG/KG/MIN: 200 INJECTION INTRAVENOUS at 11:23

## 2021-01-01 RX ADMIN — SODIUM CHLORIDE, PRESERVATIVE FREE 10 ML: 5 INJECTION INTRAVENOUS at 20:55

## 2021-01-01 RX ADMIN — INSULIN GLARGINE 25 UNITS: 100 INJECTION, SOLUTION SUBCUTANEOUS at 21:06

## 2021-01-01 RX ADMIN — SODIUM CHLORIDE, PRESERVATIVE FREE 10 ML: 5 INJECTION INTRAVENOUS at 08:48

## 2021-01-01 RX ADMIN — INSULIN LISPRO 25 UNITS: 100 INJECTION, SOLUTION INTRAVENOUS; SUBCUTANEOUS at 13:12

## 2021-01-01 RX ADMIN — HEPARIN SODIUM 5000 UNITS: 5000 INJECTION INTRAVENOUS; SUBCUTANEOUS at 22:56

## 2021-01-01 RX ADMIN — FLUTICASONE PROPIONATE 2 PUFF: 220 AEROSOL, METERED RESPIRATORY (INHALATION) at 20:06

## 2021-01-01 RX ADMIN — EPINEPHRINE 2 MCG/MIN: 1 INJECTION INTRAMUSCULAR; INTRAVENOUS; SUBCUTANEOUS at 16:41

## 2021-01-01 RX ADMIN — ASPIRIN 81 MG CHEWABLE TABLET 81 MG: 81 TABLET CHEWABLE at 09:33

## 2021-01-01 RX ADMIN — SODIUM CHLORIDE, PRESERVATIVE FREE 10 ML: 5 INJECTION INTRAVENOUS at 09:26

## 2021-01-01 RX ADMIN — SODIUM CHLORIDE, PRESERVATIVE FREE 10 ML: 5 INJECTION INTRAVENOUS at 09:04

## 2021-01-01 RX ADMIN — VORICONAZOLE 200 MG: 200 TABLET ORAL at 21:30

## 2021-01-01 RX ADMIN — PROPOFOL 30 MCG/KG/MIN: 10 INJECTION, EMULSION INTRAVENOUS at 17:50

## 2021-01-01 RX ADMIN — Medication 3 MG: at 16:48

## 2021-01-01 RX ADMIN — PREGABALIN 200 MG: 100 CAPSULE ORAL at 09:17

## 2021-01-01 RX ADMIN — PROPOFOL 30 MCG/KG/MIN: 10 INJECTION, EMULSION INTRAVENOUS at 07:54

## 2021-01-01 RX ADMIN — PREGABALIN 200 MG: 100 CAPSULE ORAL at 21:21

## 2021-01-01 RX ADMIN — PREGABALIN 200 MG: 100 CAPSULE ORAL at 09:31

## 2021-01-01 RX ADMIN — INSULIN LISPRO 30 UNITS: 100 INJECTION, SOLUTION INTRAVENOUS; SUBCUTANEOUS at 13:11

## 2021-01-01 RX ADMIN — LORAZEPAM 1 MG: 2 INJECTION INTRAMUSCULAR; INTRAVENOUS at 00:38

## 2021-01-01 RX ADMIN — INSULIN LISPRO 9 UNITS: 100 INJECTION, SOLUTION INTRAVENOUS; SUBCUTANEOUS at 12:36

## 2021-01-01 RX ADMIN — ASPIRIN 81 MG CHEWABLE TABLET 81 MG: 81 TABLET CHEWABLE at 09:39

## 2021-01-01 RX ADMIN — ASPIRIN 81 MG CHEWABLE TABLET 81 MG: 81 TABLET CHEWABLE at 08:46

## 2021-01-01 RX ADMIN — ATORVASTATIN CALCIUM 10 MG: 10 TABLET, FILM COATED ORAL at 08:59

## 2021-01-01 RX ADMIN — CARVEDILOL 3.12 MG: 6.25 TABLET, FILM COATED ORAL at 09:25

## 2021-01-01 RX ADMIN — FLUTICASONE PROPIONATE 2 SPRAY: 50 SPRAY, METERED NASAL at 10:27

## 2021-01-01 RX ADMIN — ENOXAPARIN SODIUM 30 MG: 30 INJECTION SUBCUTANEOUS at 09:06

## 2021-01-01 RX ADMIN — INSULIN LISPRO 5 UNITS: 100 INJECTION, SOLUTION INTRAVENOUS; SUBCUTANEOUS at 21:30

## 2021-01-01 RX ADMIN — PREGABALIN 200 MG: 100 CAPSULE ORAL at 16:15

## 2021-01-01 RX ADMIN — CARVEDILOL 3.12 MG: 6.25 TABLET, FILM COATED ORAL at 09:02

## 2021-01-01 RX ADMIN — Medication 2 PUFF: at 08:43

## 2021-01-01 RX ADMIN — Medication 10 MG: at 16:25

## 2021-01-01 RX ADMIN — Medication 3 MG: at 02:35

## 2021-01-01 RX ADMIN — DEXAMETHASONE SODIUM PHOSPHATE 10 MG: 10 INJECTION INTRAMUSCULAR; INTRAVENOUS at 08:43

## 2021-01-01 RX ADMIN — INSULIN LISPRO 20 UNITS: 100 INJECTION, SOLUTION INTRAVENOUS; SUBCUTANEOUS at 09:27

## 2021-01-01 RX ADMIN — REMDESIVIR 200 MG: 100 INJECTION, POWDER, LYOPHILIZED, FOR SOLUTION INTRAVENOUS at 12:32

## 2021-01-01 RX ADMIN — SODIUM CHLORIDE, PRESERVATIVE FREE 10 ML: 5 INJECTION INTRAVENOUS at 21:39

## 2021-01-01 RX ADMIN — LOSARTAN POTASSIUM 100 MG: 100 TABLET, FILM COATED ORAL at 08:47

## 2021-01-01 RX ADMIN — WHITE PETROLATUM 57.7 %-MINERAL OIL 31.9 % EYE OINTMENT: at 08:34

## 2021-01-01 RX ADMIN — WHITE PETROLATUM 57.7 %-MINERAL OIL 31.9 % EYE OINTMENT: at 04:06

## 2021-01-01 RX ADMIN — INSULIN LISPRO 3 UNITS: 100 INJECTION, SOLUTION INTRAVENOUS; SUBCUTANEOUS at 12:45

## 2021-01-01 RX ADMIN — ENOXAPARIN SODIUM 30 MG: 30 INJECTION SUBCUTANEOUS at 20:37

## 2021-01-01 RX ADMIN — INSULIN LISPRO 25 UNITS: 100 INJECTION, SOLUTION INTRAVENOUS; SUBCUTANEOUS at 17:36

## 2021-01-01 RX ADMIN — PREGABALIN 200 MG: 100 CAPSULE ORAL at 08:49

## 2021-01-01 RX ADMIN — DEXAMETHASONE SODIUM PHOSPHATE 20 MG: 4 INJECTION, SOLUTION INTRAMUSCULAR; INTRAVENOUS at 12:04

## 2021-01-01 RX ADMIN — ASPIRIN 81 MG CHEWABLE TABLET 81 MG: 81 TABLET CHEWABLE at 08:34

## 2021-01-01 RX ADMIN — ACETAMINOPHEN 1000 MG: 500 TABLET, FILM COATED ORAL at 13:18

## 2021-01-01 RX ADMIN — ATORVASTATIN CALCIUM 10 MG: 10 TABLET, FILM COATED ORAL at 09:17

## 2021-01-01 RX ADMIN — HEPARIN SODIUM 5000 UNITS: 5000 INJECTION INTRAVENOUS; SUBCUTANEOUS at 14:08

## 2021-01-01 RX ADMIN — DEXAMETHASONE SODIUM PHOSPHATE 6 MG: 4 INJECTION, SOLUTION INTRAMUSCULAR; INTRAVENOUS at 10:58

## 2021-01-01 RX ADMIN — INSULIN GLARGINE 25 UNITS: 100 INJECTION, SOLUTION SUBCUTANEOUS at 22:54

## 2021-01-01 RX ADMIN — SODIUM CHLORIDE, PRESERVATIVE FREE 10 ML: 5 INJECTION INTRAVENOUS at 07:58

## 2021-01-01 RX ADMIN — PHENYLEPHRINE HYDROCHLORIDE 100 MCG/MIN: 10 INJECTION INTRAVENOUS at 14:56

## 2021-01-01 RX ADMIN — SODIUM CHLORIDE, PRESERVATIVE FREE 10 ML: 5 INJECTION INTRAVENOUS at 21:48

## 2021-01-01 RX ADMIN — INSULIN GLARGINE 30 UNITS: 100 INJECTION, SOLUTION SUBCUTANEOUS at 21:37

## 2021-01-01 RX ADMIN — INSULIN LISPRO 6 UNITS: 100 INJECTION, SOLUTION INTRAVENOUS; SUBCUTANEOUS at 13:55

## 2021-01-01 RX ADMIN — INSULIN HUMAN 1.04 UNITS/HR: 1 INJECTION, SOLUTION INTRAVENOUS at 10:26

## 2021-01-01 RX ADMIN — WHITE PETROLATUM 57.7 %-MINERAL OIL 31.9 % EYE OINTMENT: at 12:16

## 2021-01-01 RX ADMIN — Medication 3 MG: at 02:46

## 2021-01-01 RX ADMIN — SALINE NASAL SPRAY 1 SPRAY: 1.5 SOLUTION NASAL at 21:47

## 2021-01-01 RX ADMIN — INSULIN HUMAN 10 UNITS: 100 INJECTION, SOLUTION PARENTERAL at 01:58

## 2021-01-01 RX ADMIN — SODIUM CHLORIDE, PRESERVATIVE FREE 10 ML: 5 INJECTION INTRAVENOUS at 20:38

## 2021-01-01 RX ADMIN — INSULIN LISPRO 18 UNITS: 100 INJECTION, SOLUTION INTRAVENOUS; SUBCUTANEOUS at 20:37

## 2021-01-01 RX ADMIN — PHENYLEPHRINE HYDROCHLORIDE 75 MCG/MIN: 10 INJECTION INTRAVENOUS at 04:21

## 2021-01-01 RX ADMIN — VORICONAZOLE 200 MG: 200 TABLET ORAL at 20:40

## 2021-01-01 RX ADMIN — INSULIN LISPRO 3 UNITS: 100 INJECTION, SOLUTION INTRAVENOUS; SUBCUTANEOUS at 09:00

## 2021-01-01 RX ADMIN — INSULIN LISPRO 6 UNITS: 100 INJECTION, SOLUTION INTRAVENOUS; SUBCUTANEOUS at 17:52

## 2021-01-01 RX ADMIN — INSULIN LISPRO 25 UNITS: 100 INJECTION, SOLUTION INTRAVENOUS; SUBCUTANEOUS at 12:35

## 2021-01-01 RX ADMIN — INSULIN LISPRO 10 UNITS: 100 INJECTION, SOLUTION INTRAVENOUS; SUBCUTANEOUS at 12:27

## 2021-01-01 RX ADMIN — FLUTICASONE PROPIONATE 2 SPRAY: 50 SPRAY, METERED NASAL at 08:49

## 2021-01-01 RX ADMIN — WHITE PETROLATUM 57.7 %-MINERAL OIL 31.9 % EYE OINTMENT: at 12:07

## 2021-01-01 RX ADMIN — SODIUM CHLORIDE, PRESERVATIVE FREE 10 ML: 5 INJECTION INTRAVENOUS at 21:30

## 2021-01-01 RX ADMIN — SODIUM CHLORIDE, PRESERVATIVE FREE 10 ML: 5 INJECTION INTRAVENOUS at 21:07

## 2021-01-01 RX ADMIN — WHITE PETROLATUM 57.7 %-MINERAL OIL 31.9 % EYE OINTMENT: at 00:18

## 2021-01-01 RX ADMIN — VORICONAZOLE 200 MG: 200 TABLET ORAL at 22:56

## 2021-01-01 RX ADMIN — PREGABALIN 200 MG: 100 CAPSULE ORAL at 21:46

## 2021-01-01 RX ADMIN — CHLORHEXIDINE GLUCONATE 15 ML: 1.2 RINSE ORAL at 08:21

## 2021-01-01 RX ADMIN — Medication 3 MG: at 19:58

## 2021-01-01 RX ADMIN — ASPIRIN 81 MG CHEWABLE TABLET 81 MG: 81 TABLET CHEWABLE at 09:30

## 2021-01-01 ASSESSMENT — PAIN SCALES - GENERAL
PAINLEVEL_OUTOF10: 0
PAINLEVEL_OUTOF10: 0
PAINLEVEL_OUTOF10: 4
PAINLEVEL_OUTOF10: 0
PAINLEVEL_OUTOF10: 3
PAINLEVEL_OUTOF10: 0
PAINLEVEL_OUTOF10: 0
PAINLEVEL_OUTOF10: 5
PAINLEVEL_OUTOF10: 7
PAINLEVEL_OUTOF10: 0
PAINLEVEL_OUTOF10: 7
PAINLEVEL_OUTOF10: 0
PAINLEVEL_OUTOF10: 4
PAINLEVEL_OUTOF10: 0
PAINLEVEL_OUTOF10: 7
PAINLEVEL_OUTOF10: 0
PAINLEVEL_OUTOF10: 8
PAINLEVEL_OUTOF10: 0
PAINLEVEL_OUTOF10: 1
PAINLEVEL_OUTOF10: 9
PAINLEVEL_OUTOF10: 0
PAINLEVEL_OUTOF10: 8
PAINLEVEL_OUTOF10: 0
PAINLEVEL_OUTOF10: 4
PAINLEVEL_OUTOF10: 8
PAINLEVEL_OUTOF10: 0
PAINLEVEL_OUTOF10: 8
PAINLEVEL_OUTOF10: 5
PAINLEVEL_OUTOF10: 0
PAINLEVEL_OUTOF10: 8
PAINLEVEL_OUTOF10: 0
PAINLEVEL_OUTOF10: 5
PAINLEVEL_OUTOF10: 9
PAINLEVEL_OUTOF10: 4
PAINLEVEL_OUTOF10: 0

## 2021-01-01 ASSESSMENT — PULMONARY FUNCTION TESTS
PIF_VALUE: 41
PIF_VALUE: 38
PIF_VALUE: 41
PIF_VALUE: 41
PIF_VALUE: 38
PIF_VALUE: 40
PIF_VALUE: 36
PIF_VALUE: 36
PIF_VALUE: 41
PIF_VALUE: 38
PIF_VALUE: 45
PIF_VALUE: 38
PIF_VALUE: 36
PIF_VALUE: 41
PIF_VALUE: 41
PIF_VALUE: 36
PIF_VALUE: 38
PIF_VALUE: 40
PIF_VALUE: 38
PIF_VALUE: 36
PIF_VALUE: 38
PIF_VALUE: 40
PIF_VALUE: 36
PIF_VALUE: 38
PIF_VALUE: 46
PIF_VALUE: 38
PIF_VALUE: 40
PIF_VALUE: 41
PIF_VALUE: 38
PIF_VALUE: 41
PIF_VALUE: 38
PIF_VALUE: 38
PIF_VALUE: 41
PIF_VALUE: 38
PIF_VALUE: 40
PIF_VALUE: 41
PIF_VALUE: 36
PIF_VALUE: 38
PIF_VALUE: 39
PIF_VALUE: 33
PIF_VALUE: 38
PIF_VALUE: 36
PIF_VALUE: 38
PIF_VALUE: 40
PIF_VALUE: 38
PIF_VALUE: 41
PIF_VALUE: 36
PIF_VALUE: 41
PIF_VALUE: 38
PIF_VALUE: 41
PIF_VALUE: 38
PIF_VALUE: 41
PIF_VALUE: 41
PIF_VALUE: 38
PIF_VALUE: 39
PIF_VALUE: 38
PIF_VALUE: 40
PIF_VALUE: 38
PIF_VALUE: 36
PIF_VALUE: 40
PIF_VALUE: 36
PIF_VALUE: 38
PIF_VALUE: 33
PIF_VALUE: 38
PIF_VALUE: 46
PIF_VALUE: 36
PIF_VALUE: 41
PIF_VALUE: 38
PIF_VALUE: 36
PIF_VALUE: 40
PIF_VALUE: 41
PIF_VALUE: 33
PIF_VALUE: 34
PIF_VALUE: 36
PIF_VALUE: 38
PIF_VALUE: 40
PIF_VALUE: 38
PIF_VALUE: 38
PIF_VALUE: 36
PIF_VALUE: 41
PIF_VALUE: 38
PIF_VALUE: 41
PIF_VALUE: 40
PIF_VALUE: 41
PIF_VALUE: 40
PIF_VALUE: 38
PIF_VALUE: 38
PIF_VALUE: 41
PIF_VALUE: 40
PIF_VALUE: 38
PIF_VALUE: 38
PIF_VALUE: 41
PIF_VALUE: 50
PIF_VALUE: 41
PIF_VALUE: 38
PIF_VALUE: 38
PIF_VALUE: 40
PIF_VALUE: 40
PIF_VALUE: 38
PIF_VALUE: 41
PIF_VALUE: 38
PIF_VALUE: 38
PIF_VALUE: 41
PIF_VALUE: 40
PIF_VALUE: 41
PIF_VALUE: 39
PIF_VALUE: 41
PIF_VALUE: 40
PIF_VALUE: 33
PIF_VALUE: 36
PIF_VALUE: 41
PIF_VALUE: 40
PIF_VALUE: 39
PIF_VALUE: 36
PIF_VALUE: 40
PIF_VALUE: 41
PIF_VALUE: 39
PIF_VALUE: 38
PIF_VALUE: 36
PIF_VALUE: 40
PIF_VALUE: 46
PIF_VALUE: 36
PIF_VALUE: 38
PIF_VALUE: 33
PIF_VALUE: 38
PIF_VALUE: 38
PIF_VALUE: 40
PIF_VALUE: 38
PIF_VALUE: 39
PIF_VALUE: 40
PIF_VALUE: 38
PIF_VALUE: 41
PIF_VALUE: 41
PIF_VALUE: 40
PIF_VALUE: 38
PIF_VALUE: 38
PIF_VALUE: 39
PIF_VALUE: 38
PIF_VALUE: 41
PIF_VALUE: 41
PIF_VALUE: 36
PIF_VALUE: 40
PIF_VALUE: 41
PIF_VALUE: 38
PIF_VALUE: 41
PIF_VALUE: 40
PIF_VALUE: 42
PIF_VALUE: 38
PIF_VALUE: 38
PIF_VALUE: 40
PIF_VALUE: 40
PIF_VALUE: 41
PIF_VALUE: 38
PIF_VALUE: 40
PIF_VALUE: 38
PIF_VALUE: 41
PIF_VALUE: 36
PIF_VALUE: 38
PIF_VALUE: 41
PIF_VALUE: 40
PIF_VALUE: 38
PIF_VALUE: 41
PIF_VALUE: 36
PIF_VALUE: 41
PIF_VALUE: 39
PIF_VALUE: 38
PIF_VALUE: 33
PIF_VALUE: 38
PIF_VALUE: 38
PIF_VALUE: 41
PIF_VALUE: 37
PIF_VALUE: 38
PIF_VALUE: 40
PIF_VALUE: 38
PIF_VALUE: 46
PIF_VALUE: 41
PIF_VALUE: 36
PIF_VALUE: 41
PIF_VALUE: 38
PIF_VALUE: 38
PIF_VALUE: 41
PIF_VALUE: 40
PIF_VALUE: 41
PIF_VALUE: 38
PIF_VALUE: 36
PIF_VALUE: 33
PIF_VALUE: 38
PIF_VALUE: 35
PIF_VALUE: 38
PIF_VALUE: 40
PIF_VALUE: 35
PIF_VALUE: 40
PIF_VALUE: 41
PIF_VALUE: 40
PIF_VALUE: 38
PIF_VALUE: 36
PIF_VALUE: 46
PIF_VALUE: 39
PIF_VALUE: 38
PIF_VALUE: 41
PIF_VALUE: 38
PIF_VALUE: 38
PIF_VALUE: 41
PIF_VALUE: 40
PIF_VALUE: 41
PIF_VALUE: 38
PIF_VALUE: 41
PIF_VALUE: 41
PIF_VALUE: 38
PIF_VALUE: 41
PIF_VALUE: 41
PIF_VALUE: 40
PIF_VALUE: 41
PIF_VALUE: 46
PIF_VALUE: 38
PIF_VALUE: 40
PIF_VALUE: 38
PIF_VALUE: 33
PIF_VALUE: 38
PIF_VALUE: 38
PIF_VALUE: 40
PIF_VALUE: 38
PIF_VALUE: 38
PIF_VALUE: 41
PIF_VALUE: 38
PIF_VALUE: 41
PIF_VALUE: 38
PIF_VALUE: 38
PIF_VALUE: 40
PIF_VALUE: 41
PIF_VALUE: 33
PIF_VALUE: 42
PIF_VALUE: 41
PIF_VALUE: 38
PIF_VALUE: 38
PIF_VALUE: 40
PIF_VALUE: 40
PIF_VALUE: 36
PIF_VALUE: 39
PIF_VALUE: 41
PIF_VALUE: 38
PIF_VALUE: 41
PIF_VALUE: 36
PIF_VALUE: 39
PIF_VALUE: 38
PIF_VALUE: 41
PIF_VALUE: 41
PIF_VALUE: 38
PIF_VALUE: 39
PIF_VALUE: 38
PIF_VALUE: 41
PIF_VALUE: 41
PIF_VALUE: 38
PIF_VALUE: 36
PIF_VALUE: 38
PIF_VALUE: 36
PIF_VALUE: 36
PIF_VALUE: 38
PIF_VALUE: 41
PIF_VALUE: 38
PIF_VALUE: 41
PIF_VALUE: 40
PIF_VALUE: 38
PIF_VALUE: 41
PIF_VALUE: 38
PIF_VALUE: 36
PIF_VALUE: 38

## 2021-01-01 ASSESSMENT — PAIN DESCRIPTION - DESCRIPTORS
DESCRIPTORS: HEADACHE;THROBBING
DESCRIPTORS: DULL;SHARP
DESCRIPTORS: HEADACHE
DESCRIPTORS: HEADACHE
DESCRIPTORS: SORE
DESCRIPTORS: ACHING

## 2021-01-01 ASSESSMENT — ENCOUNTER SYMPTOMS
EYE REDNESS: 0
ABDOMINAL PAIN: 0
SHORTNESS OF BREATH: 1
FACIAL SWELLING: 0
NAUSEA: 0
APNEA: 0
BACK PAIN: 0
CHOKING: 0
VOMITING: 0
SORE THROAT: 0
EYE DISCHARGE: 0

## 2021-01-01 ASSESSMENT — PAIN DESCRIPTION - ORIENTATION
ORIENTATION: MID
ORIENTATION: ANTERIOR
ORIENTATION: MID;UPPER
ORIENTATION: ANTERIOR
ORIENTATION: OTHER (COMMENT)
ORIENTATION: MID;UPPER
ORIENTATION: LEFT;LOWER

## 2021-01-01 ASSESSMENT — PAIN DESCRIPTION - PAIN TYPE
TYPE: ACUTE PAIN

## 2021-01-01 ASSESSMENT — PAIN - FUNCTIONAL ASSESSMENT
PAIN_FUNCTIONAL_ASSESSMENT: ACTIVITIES ARE NOT PREVENTED

## 2021-01-01 ASSESSMENT — PAIN DESCRIPTION - LOCATION
LOCATION: LEG
LOCATION: ABDOMEN
LOCATION: HEAD
LOCATION: ABDOMEN
LOCATION: ABDOMEN
LOCATION: GENERALIZED

## 2021-01-01 ASSESSMENT — PAIN DESCRIPTION - FREQUENCY
FREQUENCY: CONTINUOUS

## 2021-01-01 ASSESSMENT — PAIN DESCRIPTION - PROGRESSION
CLINICAL_PROGRESSION: GRADUALLY IMPROVING
CLINICAL_PROGRESSION: GRADUALLY WORSENING
CLINICAL_PROGRESSION: GRADUALLY WORSENING
CLINICAL_PROGRESSION: GRADUALLY IMPROVING
CLINICAL_PROGRESSION: GRADUALLY WORSENING

## 2021-01-01 ASSESSMENT — PAIN DESCRIPTION - ONSET
ONSET: AWAKENED FROM SLEEP
ONSET: ON-GOING
ONSET: GRADUAL

## 2021-02-11 NOTE — PROGRESS NOTES
Chief Complaint: preoperative cardiac evaluation    HPI:  No chest pains since cath in July, 2020 and doing well. No syncope no palpitations. No orthopnea. Pt has no chest pain climbing stairs. Current Outpatient Medications:     atorvastatin (LIPITOR) 10 MG tablet, Take 2 tablets by mouth daily, Disp: 30 tablet, Rfl: 3    Mometasone Furo-Formoterol Fum (DULERA IN), Inhale into the lungs, Disp: , Rfl:     aspirin 81 MG chewable tablet, Take 81 mg by mouth daily, Disp: , Rfl:     pregabalin (LYRICA) 50 MG capsule, Take 50 mg by mouth 2 times daily. ., Disp: , Rfl:     cetirizine (ZYRTEC) 10 MG tablet, Take 10 mg by mouth daily, Disp: , Rfl:     sucralfate (CARAFATE) 1 GM tablet, Take 1 tablet by mouth 4 times daily, Disp: 40 tablet, Rfl: 0    insulin glargine (LANTUS) 100 UNIT/ML injection vial, Inject 43 Units into the skin nightly, Disp: 1 vial, Rfl: 3    insulin lispro (HUMALOG KWIKPEN) 100 UNIT/ML pen, Inject 8 Units into the skin 3 times daily (before meals) Please dispense appropriate amount of pen needles, Disp: 5 Pen, Rfl: 1    metFORMIN (GLUCOPHAGE) 500 MG tablet, Take 2 tablets by mouth 2 times daily (with meals). , Disp: 60 tablet, Rfl: 3    docusate sodium (COLACE) 100 MG capsule, Take 100 mg by mouth three times daily. , Disp: , Rfl:     EPINEPHrine (EPIPEN) 0.3 MG/0.3ML SOAJ injection, Inject 0.3 mg into the muscle as needed. Use as directed for allergic reaction, Disp: , Rfl:     fluticasone (FLONASE) 50 MCG/ACT nasal spray, 2 sprays by Nasal route daily. , Disp: , Rfl:     hydrochlorothiazide (HYDRODIURIL) 25 MG tablet, Take 25 mg by mouth daily. , Disp: , Rfl:     losartan (COZAAR) 100 MG tablet, Take 100 mg by mouth daily. , Disp: , Rfl:     meclizine (ANTIVERT) 25 MG tablet, Take 25 mg by mouth 3 times daily as needed. , Disp: , Rfl:     omeprazole (PRILOSEC) 20 MG capsule, Take 40 mg by mouth every morning (before breakfast). , Disp: , Rfl:     polyethylene glycol (GLYCOLAX) packet, Take 17 g by mouth 3 times daily. , Disp: , Rfl:     sodium chloride (OCEAN) 0.65 % nasal spray, 2 sprays by Nasal route as needed for Congestion. , Disp: , Rfl:     spironolactone (ALDACTONE) 50 MG tablet, Take 50 mg by mouth 2 times daily. , Disp: , Rfl:     traZODone (DESYREL) 50 MG tablet, Take 50 mg by mouth nightly., Disp: , Rfl:     Allergies:   Latex; Doxycycline; Flagyl [metronidazole]; Ibuprofen; and Iv dye [iodides]   Review of Systems: negative for cardiac issues. Physical Examination:         Vitals:    21 1515   BP: (!) 140/80   Pulse: 100       General Appearance:  Alert, cooperative, no distress, appears stated age   Head:  Normocephalic, without obvious abnormality, atraumatic   Eyes:  PERRL   Nose: Nares normal,   Neck: Supple, JVP normal   Lungs:  Clear to auscultation bilaterally, respirations unlabored   Chest Wall:  No tenderness or deformity   Heart:  Regular rate and rhythm, normal S1, S2 normal, no murmur, No rub. No S3 / S4 gallop   Abdomen:  Soft, non-tender, +bowel sounds   Extremities: no cyanosis, no clubbing , No edema   Pulses: Symmetric extremities   Skin: no gross lesions or rashes   Pysch: Normal mood and affect   Neurologic: No gross deficits. CN II - XII grossly intact   Labs       EKG: SR with nonspecific ST/T wave abnormalities             CARDIAC CATHETERIZATION     PATIENT NAME: Connor Garcia                      :        1965  MED REC NO:   [id number]                          ROOM:       6325  ACCOUNT NO:   [de-identified]                           ADMIT DATE: 2020  PROVIDER:     Janelle Boudreaux MD     DATE OF PROCEDURE:  2020     INDICATION FOR PROCEDURE:  Chest pain, abnormal nuclear scan with  ischemia in the lateral wall.     Mallampati score was 2. ASA score was 2. The start time was 10:15 a.m. The end time was 10:31 a.m.   The patient was given Solu-Medrol for an IV  DYE allergy and then she was given Benadryl 25 mg for sedation IV push  along with Versed 1 mg and fentanyl 50 mcg IV push.     DESCRIPTION OF PROCEDURE:  Prepped and draped in sterile manner, locally  anesthetized with 2% lidocaine in the right femoral region. A 5-Maldivian  sheath was placed in right femoral artery over the guidewire. JL-4 and  JR-4 catheters were used during the diagnostic procedure. All were  aspirated and flushed prior to use. All catheters were advanced under  fluoroscopic guidance over guidewire and retracted over guidewire.     FINDINGS:  The left main coronary artery is normal.  The left anterior  descending coronary artery is normal.  There was SHIN grade 3 flow in  the left anterior descending coronary artery. Diagonal branch is  normal.  Septal  is normal.  Circumflex coronary artery is  normal.  Obtuse marginal branches #1 and #2 were normal.  There may be a  ramus intermedius branch that was normal.  It appears to be a  trifurcation of the left main. There was SHIN grade 3 flow in the left  coronary circulation. JR-4 catheter engages the right coronary artery. Injection shows normal dominant right coronary artery. Right posterior  descending is normal.  Right posterolateral branch is small, but normal.     The JR-4 catheter was advanced into the left ventricle. The LVEDP  showed heavy respiratory variation, but the average LVEDP was about 18  mmHg. Hand injection in the OVERTON projection with the JR-4 catheter  showed LV ejection fraction of 55% without wall motion abnormality. No  mitral regurgitation was seen. No gradient upon pullback from the left  ventricle to the ascending aorta.     Injection of the femoral sheath showed the sheath was located at the  bifurcation. Manual pressure will be applied.     ESTIMATED BLOOD LOSS:  Less than 15 mL.     CONCLUSIONS:  1. Normal epicardial coronary arteries. 2.  Normal left ventricular systolic function. LVEDP slightly elevated  at 18 mmHg.     PLAN:  Bedrest for six hours. Continue risk factor modification and  hydration.           Andra Palma MD     D: 07/13/2020 12:27:51       T: 07/13/2020 13:08:10     [email or web address]  Job#: [id number]     Doc#: [id number]     CC:  Scottie Brown. Mounika Holm MD       [employer]         Display only: Transcription ([id number]) on 7/13/2020 12:27 PM by Maty Grider MD   Result Information    Status: Edited Result - FINAL (Resulted: 7/13/2020 13:08)     ECG today 2/11/21: SR with nonspecific T wave abnormality. No significant changes from prior ECG. Diagnosis Orders   1. Pre-op exam  EKG 12 Lead   2. Essential hypertension     3. Mixed hyperlipidemia       Preop clearance. No CAD at cath and no chest pains. No exertional chest pains since normal cath. Pt is at acceptably mild risk from the  to proceed with right shoulder surgery.

## 2021-08-02 NOTE — ED PROVIDER NOTES
[facility]  EMERGENCY DEPT VISIT      Patient Identification  Jd Treadwell is a 60 y.o. female. Chief Complaint   Abdominal Pain (mid upper)      History of Present Illness: This is a  60 y.o. female who presents ambulatory  to the ED with complaints of epigastric abdominal pain which radiates through to her back. This started last night. She has had no fever. No nausea or vomiting. No diarrhea. No melena or hematochezia. It is worse if she moves or takes a deep breath. She does not actually feel short of breath. No cough. There is no radiation up into her chest.    Past Medical History:   Diagnosis Date    Asthma     Bipolar 1 disorder (HCC)     Chronic back pain     DDD (degenerative disc disease)     Depression     Diabetes mellitus (HCC)     Fibromyalgia     Hyperlipidemia     Hypertension        Past Surgical History:   Procedure Laterality Date    CARPAL TUNNEL RELEASE      HAND SURGERY      HYSTERECTOMY         No current facility-administered medications for this encounter. Current Outpatient Medications:     sucralfate (CARAFATE) 1 GM tablet, Take 1 tablet by mouth 4 times daily (with meals and nightly), Disp: 40 tablet, Rfl: 0    methocarbamol (ROBAXIN-750) 750 MG tablet, Take 1 tablet by mouth 3 times daily for 5 days, Disp: 15 tablet, Rfl: 0    atorvastatin (LIPITOR) 10 MG tablet, Take 2 tablets by mouth daily, Disp: 30 tablet, Rfl: 3    Mometasone Furo-Formoterol Fum (DULERA IN), Inhale into the lungs, Disp: , Rfl:     aspirin 81 MG chewable tablet, Take 81 mg by mouth daily, Disp: , Rfl:     pregabalin (LYRICA) 50 MG capsule, Take 50 mg by mouth 2 times daily. ., Disp: , Rfl:     cetirizine (ZYRTEC) 10 MG tablet, Take 10 mg by mouth daily, Disp: , Rfl:     sucralfate (CARAFATE) 1 GM tablet, Take 1 tablet by mouth 4 times daily, Disp: 40 tablet, Rfl: 0    insulin glargine (LANTUS) 100 UNIT/ML injection vial, Inject 43 Units into the skin nightly, Disp: 1 vial, Rfl: 3   insulin lispro (HUMALOG KWIKPEN) 100 UNIT/ML pen, Inject 8 Units into the skin 3 times daily (before meals) Please dispense appropriate amount of pen needles, Disp: 5 Pen, Rfl: 1    metFORMIN (GLUCOPHAGE) 500 MG tablet, Take 2 tablets by mouth 2 times daily (with meals). , Disp: 60 tablet, Rfl: 3    docusate sodium (COLACE) 100 MG capsule, Take 100 mg by mouth three times daily. , Disp: , Rfl:     EPINEPHrine (EPIPEN) 0.3 MG/0.3ML SOAJ injection, Inject 0.3 mg into the muscle as needed. Use as directed for allergic reaction, Disp: , Rfl:     fluticasone (FLONASE) 50 MCG/ACT nasal spray, 2 sprays by Nasal route daily. , Disp: , Rfl:     hydrochlorothiazide (HYDRODIURIL) 25 MG tablet, Take 25 mg by mouth daily. , Disp: , Rfl:     losartan (COZAAR) 100 MG tablet, Take 100 mg by mouth daily. , Disp: , Rfl:     meclizine (ANTIVERT) 25 MG tablet, Take 25 mg by mouth 3 times daily as needed. , Disp: , Rfl:     omeprazole (PRILOSEC) 20 MG capsule, Take 40 mg by mouth every morning (before breakfast). , Disp: , Rfl:     polyethylene glycol (GLYCOLAX) packet, Take 17 g by mouth 3 times daily. , Disp: , Rfl:     sodium chloride (OCEAN) 0.65 % nasal spray, 2 sprays by Nasal route as needed for Congestion. , Disp: , Rfl:     spironolactone (ALDACTONE) 50 MG tablet, Take 50 mg by mouth 2 times daily. , Disp: , Rfl:     traZODone (DESYREL) 50 MG tablet, Take 50 mg by mouth nightly., Disp: , Rfl:     Allergies   Allergen Reactions    Latex     Doxycycline Anaphylaxis    Flagyl [Metronidazole] Anaphylaxis    Ibuprofen Anaphylaxis    Iv Dye [Iodides] Anaphylaxis       Social History     Socioeconomic History    Marital status:      Spouse name: Not on file    Number of children: Not on file    Years of education: Not on file    Highest education level: Not on file   Occupational History    Not on file   Tobacco Use    Smoking status: Never Smoker    Smokeless tobacco: Never Used   Substance and Sexual Activity  Alcohol use: Not Currently     Comment: 1-2 times a month    Drug use: No    Sexual activity: Yes     Partners: Male   Other Topics Concern    Not on file   Social History Narrative    Not on file     Social Determinants of Health     Financial Resource Strain:     Difficulty of Paying Living Expenses:    Food Insecurity:     Worried About Running Out of Food in the Last Year:     [address] in the Last Year:    Transportation Needs:     Lack of Transportation (Medical):  Lack of Transportation (Non-Medical):    Physical Activity:     Days of Exercise per Week:     Minutes of Exercise per Session:    Stress:     Feeling of Stress :    Social Connections:     Frequency of Communication with Friends and Family:     Frequency of Social Gatherings with Friends and Family:     Attends Yarsanism Services:     Active Member of Clubs or Organizations:     Attends Club or Organization Meetings:     Marital Status:    Intimate Partner Violence:     Fear of Current or Ex-Partner:     Emotionally Abused:     Physically Abused:     Sexually Abused:        Nursing Notes Reviewed      ROS:  GENERAL:  No fever, no chills, no diaphoresis, no appetite changes  EYES: no eye discharge, no eye redness, no visual changes  ENT: no nasal congestion, no sore throat  CARDIAC: no chest pain,no leg swelling  PULM: no cough, no shortness of breath  ABD: + abdominal pain, no nausea, no vomiting, no diarrhea, no melena or hematochezia  : no dysuria, no hematuria, no urgency, no frequency. No flank pain  MUSCULOSKELETAL: no back pain, no arthralgias, no myalgias  NEURO: no headache, no lightheadedness, no dizziness, no numbness, no weakness, no syncope  SKIN: no rashes, no erythema, no wounds, no ecchymosis      PHYSICAL EXAM:  GENERAL APPEARANCE: Marlyn Murray is in no acute respiratory distress. Awake and alert.   VITAL SIGNS:   ED Triage Vitals [08/02/21 1258]   Enc Vitals Group      BP (!) 158/73      Pulse 88      Resp 20      Temp 98.6 °F (37 °C)      Temp Source Oral      SpO2 98 %      Weight 196 lb 7 oz (89.1 kg)      Height 5' 3.5\" (1.613 m)      Head Circumference       Peak Flow       Pain Score       Pain Loc       Pain Edu? Excl. in [address]? HEAD: Normocephalic, atraumatic. EYES:  Extraocular muscles are intact. Pupils equal round and reactive to light. Conjunctivas are pink. Negative scleral icterus. ENT:  Mucous membranes are moist.  Pharynx without erythema or exudates. NECK: Nontender and supple. No cervical adenopathy. CHEST:  Clear to auscultation bilaterally. No rales, rhonchi, or wheezing. HEART:  Regular rate and regular rhythm. No murmurs. Strong and equal pulses in the upper and lower extremities. ABDOMEN: Soft,  nondistended, positive bowel sounds. abdomen is tender in the epigastrium. No rebound. no guarding. MUSCULOSKELETAL: The calves are nontender to palpation. Active range of motion of the upper and lower extremities. No edema. NEUROLOGICAL: Awake, alert and oriented x 3. Power intact in the upper and lower extremities. Sensation is intact to light touch in the upper and lower extremities. Cranial Nerves 2-12 are intact. DERMATOLOGIC: No petechiae, rashes, or ecchymoses. No erythema. PSYCH: normal mood and affect. Normal thought content. ED COURSE AND MEDICAL DECISION MAKING:    EKG as interpreted by myself:  normal sinus rhythm with a rate of 78  Axis is   Normal  QTc is  normal  Intervals and Durations are unremarkable. No specific ST-T wave changes appreciated. No evidence of acute ischemia. Compared to prior EKG dated 7/8/20, no significant change    Radiology:  Films have been read by radiologist as noted in chart unless otherwise stated. Other radiologic studies (i.e. CT, MRI, ultrasounds, etc ) have been interpreted by radiologist.     XR CHEST (2 VW)   Final Result      No evidence for acute cardiopulmonary disease.  There is no consolidation or other detectable airspace disease on plain radiographs. CT ABDOMEN PELVIS WO CONTRAST Additional Contrast? None   Final Result         1. No evidence for acute abnormality in the abdomen or pelvis   2. Bilateral lower lobe dependent patchy groundglass opacity may represent atelectasis. CT ABDOMEN PELVIS WO CONTRAST Additional Contrast? None    Result Date: 8/2/2021  EXAM: CT ABDOMEN AND PELVIS WITHOUT CONTRAST INDICATION: epigastric pain, COMPARISON: 10/4/2017 TECHNIQUE: Axial CT imaging obtained from lung bases through pelvis. Axial images and multiplanar reformatted images are provided for review. CT radiation dose optimization techniques (automated exposure control, and use of iterative reconstruction techniques, or adjustment of the mA and/or kV according to patient size) were used to limit patient radiation dose. IV Contrast: None. Oral Contrast: No. FINDINGS: LUNG BASES: There is dependent patchy groundglass opacity, possibly atelectasis in the lower lobes. There is no consolidation or pleural effusion. LIVER: Normal. GALLBLADDER AND BILIARY DUCTS: No calcified gallstones. Not distended. No intra- or extrahepatic biliary dilatation. PANCREAS: Normal. SPLEEN: Normal. ADRENAL GLANDS: Normal. KIDNEYS AND URETERS: No hydronephrosis  No renal stones. URINARY BLADDER: Normal. REPRODUCTIVE ORGANS: Hysterectomy, no BOWEL: There is diverticulum suggested projecting from the posterior wall of the body of stomach. Small and large bowel loops are normal caliber. The appendix is normal. No bowel wall thickening is demonstrated. LYMPH NODES: No abnormally enlarged nodes. PERITONEUM/RETROPERITONEUM: No ascites or free air. VESSELS: Aorta is normal diameter ABDOMINAL WALL: Diastases of the rectus abdominis muscles is present. No acute abnormality. BONES: No destructive process     1. No evidence for acute abnormality in the abdomen or pelvis 2.  Bilateral lower lobe dependent patchy groundglass opacity may represent atelectasis. XR CHEST (2 VW)    Result Date: 8/2/2021  EXAM: CHEST 2 VIEWS X-RAY INDICATION: Epigastric pain COMPARISON: CT abdomen and pelvis 8/2/2021 FINDINGS: HEART / MEDIASTINUM: Within normal limits. LUNGS/PLEURA: No dense focal consolidation, pulmonary venous congestion, pleural effusion or pneumothorax. BONES / SOFT TISSUES: No acute abnormality. OTHER: None. No evidence for acute cardiopulmonary disease. There is no consolidation or other detectable airspace disease on plain radiographs.      Labs:  Results for orders placed or performed during the hospital encounter of 08/02/21   CBC Auto Differential   Result Value Ref Range    WBC 9.9 4.0 - 11.0 K/uL    RBC 3.86 (L) 4.00 - 5.20 M/uL    Hemoglobin 11.9 (L) 12.0 - 16.0 g/dL    Hematocrit 35.1 (L) 36.0 - 48.0 %    MCV 91.1 80.0 - 100.0 fL    MCH 30.8 26.0 - 34.0 pg    MCHC 33.8 31.0 - 36.0 g/dL    RDW 12.4 12.4 - 15.4 %    Platelets 466 175 - 302 K/uL    MPV 8.2 5.0 - 10.5 fL    Neutrophils % 63.8 %    Lymphocytes % 25.5 %    Monocytes % 7.2 %    Eosinophils % 2.8 %    Basophils % 0.7 %    Neutrophils Absolute 6.3 1.7 - 7.7 K/uL    Lymphocytes Absolute 2.5 1.0 - 5.1 K/uL    Monocytes Absolute 0.7 0.0 - 1.3 K/uL    Eosinophils Absolute 0.3 0.0 - 0.6 K/uL    Basophils Absolute 0.1 0.0 - 0.2 K/uL   Comprehensive Metabolic Panel   Result Value Ref Range    Sodium 139 136 - 145 mmol/L    Potassium 3.8 3.5 - 5.1 mmol/L    Chloride 102 99 - 110 mmol/L    CO2 27 21 - 32 mmol/L    Anion Gap 10 3 - 16    Glucose 144 (H) 70 - 99 mg/dL    BUN 13 7 - 20 mg/dL    CREATININE 0.8 0.6 - 1.1 mg/dL    GFR Non-African American >60 >60    GFR African American >60 >60    Calcium 10.0 8.3 - 10.6 mg/dL    Total Protein 7.9 6.4 - 8.2 g/dL    Albumin 4.9 3.4 - 5.0 g/dL    Albumin/Globulin Ratio 1.6 1.1 - 2.2    Total Bilirubin 0.7 0.0 - 1.0 mg/dL    Alkaline Phosphatase 87 40 - 129 U/L    ALT 12 10 - 40 U/L    AST 16 15 - 37 U/L    Globulin 3.0 g/dL   Lipase   Result Value Ref Range    Lipase 41.0 13.0 - 60.0 U/L   Lactic Acid, Plasma   Result Value Ref Range    Lactic Acid 1.3 0.4 - 2.0 mmol/L   Troponin   Result Value Ref Range    Troponin <0.01 <0.01 ng/mL   Urinalysis Reflex to Culture    Specimen: Urine, clean catch   Result Value Ref Range    Color, UA Yellow Straw/Yellow    Clarity, UA Clear Clear    Glucose, Ur Negative Negative mg/dL    Bilirubin Urine Negative Negative    Ketones, Urine Negative Negative mg/dL    Specific Gravity, UA 1.010 1.005 - 1.030    Blood, Urine Negative Negative    pH, UA 6.5 5.0 - 8.0    Protein, UA Negative Negative mg/dL    Urobilinogen, Urine 0.2 <2.0 E.U./dL    Nitrite, Urine Negative Negative    Leukocyte Esterase, Urine Negative Negative    Microscopic Examination Not Indicated     Urine Type NotGiven     Urine Reflex to Culture Not Indicated    Blood Gas, Venous   Result Value Ref Range    pH, Gary 7.365 7.350 - 7.450    pCO2, Gary 49.5 40.0 - 50.0 mmHg    pO2, Gary 63.1 (H) 25 - 40 mmHg    HCO3, Venous 28.3 23.0 - 29.0 mmol/L    Base Excess, Gary 2.9 -3.0 - 3.0 mmol/L    O2 Sat, Gary 91 Not Established %    TC02 (Calc), Gary 29 Not Established mmol/L    O2 Therapy Unknown    EKG 12 Lead   Result Value Ref Range    Ventricular Rate 78 BPM    Atrial Rate 78 BPM    P-R Interval 160 ms    QRS Duration 92 ms    Q-T Interval 396 ms    QTc Calculation (Bazett) 451 ms    P Axis 63 degrees    R Axis 8 degrees    T Axis 35 degrees    Diagnosis       Normal sinus rhythmPossible Left atrial enlargementBorderline ECGConfirmed by David Gutiérrez MD, [facility] ([id number]) on 8/2/2021 5:37:52 PM       Treatment in the department:  Patient received the following while in the ED.       Medications   morphine injection 4 mg (4 mg Intravenous Given 8/2/21 1439)   famotidine (PEPCID) injection 20 mg (20 mg Intravenous Given 8/2/21 1439)   ondansetron (ZOFRAN) injection 4 mg (4 mg Intravenous Given 8/2/21 1439)   aluminum & magnesium hydroxide-simethicone (MAALOX) 30 mL, lidocaine viscous hcl (XYLOCAINE) 5 mL (GI COCKTAIL) ( Oral Given 8/2/21 1711)   orphenadrine (NORFLEX) injection 60 mg (60 mg Intramuscular Given 8/2/21 1711)       Repeat exam  shows pain persists in epigastrium but improving. No vomiting. Medical decision making:  Patient with 24 hours of epigastric pain into chest. No fever or vomiting. No fever or leukocytosis or left shift or lactic acidosis. No OLEGARIO or lab evidence of pancreatitis. LFTs normal. EKG nonischemic and troponin negative with constant pain since last night. CT imaging with no obstruction or free air. No acute pancreatitis. No CT evidence of cholecystitis. Has apparent stomach diverticulum but this does NOT look acutely inflamed and appears similar to prior imaging (discussed this with second radiologist who also reviewed films) could have PUD but no signs of perforation or h/o suggestive of acute bleeding. I estimate there is LOW risk for ACUTE APPENDICITIS, BOWEL OBSTRUCTION, CHOLECYSTITIS, COMPLICATED DIVERTICULITIS, INCARCERATED HERNIA, PANCREATITIS,  PERFORATED BOWEL or ULCER, ISCHEMIC BOWEL, ABDOMINAL AORTIC ANEURYSM, AORTIC DISSECTION, thus I consider the discharge disposition reasonable. Also, there is no evidence or peritonitis, sepsis, or toxicity. Kun Chen and I have discussed the diagnosis and risks, and we agree with discharging home to follow-up with their primary doctor. We also discussed returning to the Emergency Department immediately if new or worsening symptoms occur. Clinical Impression:  1. Abdominal pain, epigastric        Dispo:  Patient will be discharged  at this time. Patient was informed of this decision and agrees with plan. I have discussed lab and xray findings with patient and they understand. Questions were answered to the best of my ability. Discharge vitals:  Blood pressure 133/65, pulse 80, temperature 98.6 °F (37 °C), temperature source Oral, resp.  rate 14, height 5' 3.5\" (1.613 m), weight 196 lb 7 oz (89.1 kg), SpO2 98 %, not currently breastfeeding. Prescriptions given:   Discharge Medication List as of 8/2/2021  6:40 PM      START taking these medications    Details   !! sucralfate (CARAFATE) 1 GM tablet Take 1 tablet by mouth 4 times daily (with meals and nightly), Disp-40 tablet, R-0Normal      methocarbamol (ROBAXIN-750) 750 MG tablet Take 1 tablet by mouth 3 times daily for 5 days, Disp-15 tablet, R-0Normal       !! - Potential duplicate medications found. Please discuss with provider. This chart was created using Dragon voice recognition software.         Britt Hoover MD  08/03/21 9715

## 2021-08-03 NOTE — CARE COORDINATION
Patient contacted regarding COVID-19 risk. Discussed COVID-19 related testing which was pending at this time. Test results were pending. Patient informed of results, if available? Aware pending. Ambulatory Care Manager contacted the patient by telephone to perform post discharge assessment. Call within 2 business days of discharge: Yes. Verified name and  with patient as identifiers. Provided introduction to self, and explanation of the CTN/ACM role, and reason for call due to risk factors for infection and/or exposure to COVID-19. Symptoms reviewed with patient who verbalized the following symptoms: no new symptoms and no worsening symptoms. Due to no new or worsening symptoms encounter was not routed to provider for escalation. Discussed follow-up appointments. If no appointment was previously scheduled, appointment scheduling offered: plans to call for fu. [facility] follow up appointment(s):   Future Appointments   Date Time Provider Vel Craig   2021  2:15 PM Diego Moore MD [facility]     Non-[facility] follow up appointment(s): see above    Non-face-to-face services provided:  see above     Advance Care Planning:   Does patient have an Advance Directive:  reviewed and needs to be updated. Educated patient about risk for severe COVID-19 due to risk factors according to CDC guidelines. ACM reviewed discharge instructions, medical action plan and red flag symptoms with the patient who verbalized understanding. Discussed COVID vaccination status: No. Education provided on COVID-19 vaccination as appropriate. Discussed exposure protocols and quarantine with CDC Guidelines. Patient was given an opportunity to verbalize any questions and concerns and agrees to contact ACM or health care provider for questions related to their healthcare. Reviewed and educated patient on any new and changed medications related to discharge diagnosis     Was patient discharged with a pulse oximeter? No Discussed and confirmed pulse oximeter discharge instructions and when to notify provider or seek emergency care. ACM provided contact information. Plan for follow-up call in 3-5 days based on severity of symptoms and risk factors. Reports is feeling about the same. Did get medications filled and taking them without any problems.

## 2021-08-16 NOTE — PROGRESS NOTES
Chief Complaint: preoperative cardiac evaluation    HPI:  No chest pains since cath in July, 2020 and doing well. No syncope no palpitations. No orthopnea. Pt has no chest pain climbing stairs. Current Outpatient Medications:     sucralfate (CARAFATE) 1 GM tablet, Take 1 tablet by mouth 4 times daily (with meals and nightly), Disp: 40 tablet, Rfl: 0    atorvastatin (LIPITOR) 10 MG tablet, Take 2 tablets by mouth daily, Disp: 30 tablet, Rfl: 3    Mometasone Furo-Formoterol Fum (DULERA IN), Inhale into the lungs, Disp: , Rfl:     aspirin 81 MG chewable tablet, Take 81 mg by mouth daily, Disp: , Rfl:     pregabalin (LYRICA) 50 MG capsule, Take 50 mg by mouth 2 times daily. ., Disp: , Rfl:     cetirizine (ZYRTEC) 10 MG tablet, Take 10 mg by mouth daily, Disp: , Rfl:     sucralfate (CARAFATE) 1 GM tablet, Take 1 tablet by mouth 4 times daily, Disp: 40 tablet, Rfl: 0    insulin glargine (LANTUS) 100 UNIT/ML injection vial, Inject 43 Units into the skin nightly, Disp: 1 vial, Rfl: 3    insulin lispro (HUMALOG KWIKPEN) 100 UNIT/ML pen, Inject 8 Units into the skin 3 times daily (before meals) Please dispense appropriate amount of pen needles, Disp: 5 Pen, Rfl: 1    metFORMIN (GLUCOPHAGE) 500 MG tablet, Take 2 tablets by mouth 2 times daily (with meals). , Disp: 60 tablet, Rfl: 3    docusate sodium (COLACE) 100 MG capsule, Take 100 mg by mouth three times daily. , Disp: , Rfl:     EPINEPHrine (EPIPEN) 0.3 MG/0.3ML SOAJ injection, Inject 0.3 mg into the muscle as needed. Use as directed for allergic reaction, Disp: , Rfl:     fluticasone (FLONASE) 50 MCG/ACT nasal spray, 2 sprays by Nasal route daily. , Disp: , Rfl:     hydrochlorothiazide (HYDRODIURIL) 25 MG tablet, Take 25 mg by mouth daily. , Disp: , Rfl:     losartan (COZAAR) 100 MG tablet, Take 100 mg by mouth daily. , Disp: , Rfl:     meclizine (ANTIVERT) 25 MG tablet, Take 25 mg by mouth 3 times daily as needed. , Disp: , Rfl:     omeprazole (PRILOSEC) 20 MG capsule, Take 40 mg by mouth every morning (before breakfast). , Disp: , Rfl:     polyethylene glycol (GLYCOLAX) packet, Take 17 g by mouth 3 times daily. , Disp: , Rfl:     sodium chloride (OCEAN) 0.65 % nasal spray, 2 sprays by Nasal route as needed for Congestion. , Disp: , Rfl:     spironolactone (ALDACTONE) 50 MG tablet, Take 50 mg by mouth 2 times daily. , Disp: , Rfl:     traZODone (DESYREL) 50 MG tablet, Take 50 mg by mouth nightly., Disp: , Rfl:     Allergies:   Latex; Doxycycline; Flagyl [metronidazole]; Ibuprofen; and Iv dye [iodides]   Review of Systems: negative for cardiac issues. Physical Examination:         Vitals:    21 1449   BP: (!) 138/96   Pulse: 78       General Appearance:  Alert, cooperative, no distress, appears stated age   Head:  Normocephalic, without obvious abnormality, atraumatic   Eyes:  PERRL   Nose: Nares normal,   Neck: Supple, JVP normal   Lungs:  Clear to auscultation bilaterally, respirations unlabored   Chest Wall:  No tenderness or deformity   Heart:  Regular rate and rhythm, normal S1, S2 normal, no murmur, No rub. No S3 / S4 gallop   Abdomen:  Soft, non-tender, +bowel sounds   Extremities: no cyanosis, no clubbing , No edema   Pulses: Symmetric extremities   Skin: no gross lesions or rashes   Pysch: Normal mood and affect   Neurologic: No gross deficits. CN II - XII grossly intact   Labs       EKG: SR with nonspecific ST/T wave abnormalities             CARDIAC CATHETERIZATION     PATIENT NAME: Jose C Elias                      :        1965  MED REC NO:   [id number]                          ROOM:       6325  ACCOUNT NO:   [de-identified]                           ADMIT DATE: 2020  PROVIDER:     Amalia Bernard. Leanne Pickett MD     DATE OF PROCEDURE:  2020     INDICATION FOR PROCEDURE:  Chest pain, abnormal nuclear scan with  ischemia in the lateral wall.     Mallampati score was 2. ASA score was 2. The start time was 10:15 a.m.   The end time was 10:31 a.m. The patient was given Solu-Medrol for an IV  DYE allergy and then she was given Benadryl 25 mg for sedation IV push  along with Versed 1 mg and fentanyl 50 mcg IV push.     DESCRIPTION OF PROCEDURE:  Prepped and draped in sterile manner, locally  anesthetized with 2% lidocaine in the right femoral region. A 5-Panamanian  sheath was placed in right femoral artery over the guidewire. JL-4 and  JR-4 catheters were used during the diagnostic procedure. All were  aspirated and flushed prior to use. All catheters were advanced under  fluoroscopic guidance over guidewire and retracted over guidewire.     FINDINGS:  The left main coronary artery is normal.  The left anterior  descending coronary artery is normal.  There was SHIN grade 3 flow in  the left anterior descending coronary artery. Diagonal branch is  normal.  Septal  is normal.  Circumflex coronary artery is  normal.  Obtuse marginal branches #1 and #2 were normal.  There may be a  ramus intermedius branch that was normal.  It appears to be a  trifurcation of the left main. There was SHIN grade 3 flow in the left  coronary circulation. JR-4 catheter engages the right coronary artery. Injection shows normal dominant right coronary artery. Right posterior  descending is normal.  Right posterolateral branch is small, but normal.     The JR-4 catheter was advanced into the left ventricle. The LVEDP  showed heavy respiratory variation, but the average LVEDP was about 18  mmHg. Hand injection in the OVERTON projection with the JR-4 catheter  showed LV ejection fraction of 55% without wall motion abnormality. No  mitral regurgitation was seen. No gradient upon pullback from the left  ventricle to the ascending aorta.     Injection of the femoral sheath showed the sheath was located at the  bifurcation. Manual pressure will be applied.     ESTIMATED BLOOD LOSS:  Less than 15 mL.     CONCLUSIONS:  1.   Normal epicardial coronary arteries. 2.  Normal left ventricular systolic function. LVEDP slightly elevated  at 18 mmHg.     PLAN:  Bedrest for six hours. Continue risk factor modification and  hydration.           Phylicia Deleon MD     D: 07/13/2020 12:27:51       T: 07/13/2020 13:08:10     [email or web address]  Job#: [id number]     Doc#: [id number]     CC:  Joe Castro. MD Yvrose Al         Display only: Transcription ([id number]) on 7/13/2020 12:27 PM by Frankie Cruz MD   Result Information    Status: Edited Result - FINAL (Resulted: 7/13/2020 13:08)     ECG today 2/11/21: SR with nonspecific T wave abnormality. No significant changes from prior ECG. Preop clearance. No CAD at cath and no chest pains. No exertional chest pains since normal cath. Pt is at acceptably mild risk from the  to proceed with left shoulder surgery. Would add coreg 3.125 mg bid.

## 2021-09-17 NOTE — ED PROVIDER NOTES
[facility] Emergency Department      CHIEF COMPLAINT  Cough, Emesis, and Concern For COVID-19      HISTORY OF PRESENT ILLNESS  Mecca Hinojosa is a 60 y.o. female with a history of asthma, diabetes, hypertension, hyperlipidemia and fibromyalgia presents with body aches and malaise. She states she works at [employer] and there have been multiple cases there of Covid recently. Several days ago she developed cough, shortness of breath, body aches, headaches, low-grade fevers and intractable nausea and vomiting. She states she is not able to keep anything down. She states she feels horrible. She has not been vaccinated for COVID-19. Reva Terrell No other complaints, modifying factors or associated symptoms. I have reviewed the following from the nursing documentation.     Past Medical History:   Diagnosis Date    Asthma     Bipolar 1 disorder ([address]     Chronic back pain     DDD (degenerative disc disease)     Depression     Diabetes mellitus (HCC)     Fibromyalgia     Hyperlipidemia     Hypertension      Past Surgical History:   Procedure Laterality Date    CARPAL TUNNEL RELEASE      HAND SURGERY      HYSTERECTOMY       Family History   Problem Relation Age of Onset    Heart Disease Mother     Heart Disease Father      Social History     Socioeconomic History    Marital status:      Spouse name: Not on file    Number of children: Not on file    Years of education: Not on file    Highest education level: Not on file   Occupational History    Not on file   Tobacco Use    Smoking status: Never Smoker    Smokeless tobacco: Never Used   Substance and Sexual Activity    Alcohol use: Not Currently     Comment: 1-2 times a month    Drug use: No    Sexual activity: Yes     Partners: Male   Other Topics Concern    Not on file   Social History Narrative    Not on file     Social Determinants of Health     Financial Resource Strain:     Difficulty of Paying Living Expenses:    Food Insecurity:     Worried About Running Out of Food in the Last Year:     [address] in the Last Year:    Transportation Needs:     Lack of Transportation (Medical):  Lack of Transportation (Non-Medical):    Physical Activity:     Days of Exercise per Week:     Minutes of Exercise per Session:    Stress:     Feeling of Stress :    Social Connections:     Frequency of Communication with Friends and Family:     Frequency of Social Gatherings with Friends and Family:     Attends [employer]ab[employer] Services:     Active Member of Clubs or Organizations:     Attends Club or Organization Meetings:     Marital Status:    Intimate Partner Violence:     Fear of Current or Ex-Partner:     Emotionally Abused:     Physically Abused:     Sexually Abused:      No current facility-administered medications for this encounter. Current Outpatient Medications   Medication Sig Dispense Refill    ondansetron (ZOFRAN) 4 MG tablet Take 1 tablet by mouth 3 times daily as needed for Nausea or Vomiting 15 tablet 0    HYDROcodone-homatropine (HYCODAN) 5-1.5 MG/5ML syrup Take 5 mLs by mouth every 8 hours as needed (cough) for up to 3 days. 50 mL 0    carvedilol (COREG) 3.125 MG tablet Take 1 tablet by mouth daily 180 tablet 3    atorvastatin (LIPITOR) 10 MG tablet Take 2 tablets by mouth daily 30 tablet 3    aspirin 81 MG chewable tablet Take 81 mg by mouth daily      insulin glargine (LANTUS) 100 UNIT/ML injection vial Inject 43 Units into the skin nightly 1 vial 3    insulin lispro (HUMALOG KWIKPEN) 100 UNIT/ML pen Inject 8 Units into the skin 3 times daily (before meals) Please dispense appropriate amount of pen needles 5 Pen 1    metFORMIN (GLUCOPHAGE) 500 MG tablet Take 2 tablets by mouth 2 times daily (with meals). 60 tablet 3    losartan (COZAAR) 100 MG tablet Take 100 mg by mouth daily.       sucralfate (CARAFATE) 1 GM tablet Take 1 tablet by mouth 4 times daily (with meals and nightly) 40 tablet 0    rhonchi. Speaking comfortably in full sentences. Coughing throughout the exam but lungs are clear. ABDOMEN: Soft. Non-distended. Non-tender. No guarding or rebound. EXTREMITIES: No peripheral edema. MAEE. No acute deformities. SKIN: Warm, dry and intact. No acute rashes. NEUROLOGICAL: Alert and oriented X 3. CN II-XII grossly intact. Strength 5/5, sensation intact. Normal coordination. Steady gait. PSYCHIATRIC: Normal mood and affect. LABS  I have reviewed all labs for this visit.    Results for orders placed or performed during the hospital encounter of 09/17/21   Urinalysis   Result Value Ref Range    Color, UA Yellow Straw/Yellow    Clarity, UA Clear Clear    Glucose, Ur 100 (A) Negative mg/dL    Bilirubin Urine Negative Negative    Ketones, Urine TRACE (A) Negative mg/dL    Specific Gravity, UA 1.020 1.005 - 1.030    Blood, Urine TRACE-INTACT (A) Negative    pH, UA 5.5 5.0 - 8.0    Protein,  (A) Negative mg/dL    Urobilinogen, Urine 0.2 <2.0 E.U./dL    Nitrite, Urine Negative Negative    Leukocyte Esterase, Urine Negative Negative    Microscopic Examination YES     Urine Type NotGiven    CBC Auto Differential   Result Value Ref Range    WBC 5.8 4.0 - 11.0 K/uL    RBC 4.34 4.00 - 5.20 M/uL    Hemoglobin 13.1 12.0 - 16.0 g/dL    Hematocrit 39.1 36.0 - 48.0 %    MCV 90.2 80.0 - 100.0 fL    MCH 30.2 26.0 - 34.0 pg    MCHC 33.5 31.0 - 36.0 g/dL    RDW 12.3 (L) 12.4 - 15.4 %    Platelets 522 717 - 116 K/uL    MPV 9.0 5.0 - 10.5 fL    Neutrophils % 73.4 %    Lymphocytes % 15.6 %    Monocytes % 6.7 %    Eosinophils % 0.2 %    Basophils % 4.1 %    Neutrophils Absolute 4.2 1.7 - 7.7 K/uL    Lymphocytes Absolute 0.9 (L) 1.0 - 5.1 K/uL    Monocytes Absolute 0.4 0.0 - 1.3 K/uL    Eosinophils Absolute 0.0 0.0 - 0.6 K/uL    Basophils Absolute 0.2 0.0 - 0.2 K/uL   Comprehensive Metabolic Panel w/ Reflex to MG   Result Value Ref Range    Sodium 137 136 - 145 mmol/L    Potassium reflex Magnesium 4.2 3.5 - 5.1 mmol/L    Chloride 98 (L) 99 - 110 mmol/L    CO2 22 21 - 32 mmol/L    Anion Gap 17 (H) 3 - 16    Glucose 351 (H) 70 - 99 mg/dL    BUN 35 (H) 7 - 20 mg/dL    CREATININE 1.8 (H) 0.6 - 1.1 mg/dL    GFR Non-African American 29 (A) >60    GFR  35 (A) >60    Calcium 9.6 8.3 - 10.6 mg/dL    Total Protein 8.5 (H) 6.4 - 8.2 g/dL    Albumin 4.6 3.4 - 5.0 g/dL    Albumin/Globulin Ratio 1.2 1.1 - 2.2    Total Bilirubin 0.8 0.0 - 1.0 mg/dL    Alkaline Phosphatase 89 40 - 129 U/L    ALT 28 10 - 40 U/L    AST 53 (H) 15 - 37 U/L    Globulin 3.9 g/dL   Blood Gas, Venous   Result Value Ref Range    pH, Gary 7.365 7.350 - 7.450    pCO2, Gary 39.1 (L) 40.0 - 50.0 mmHg    pO2, Gary 52.3 (H) 25 - 40 mmHg    HCO3, Venous 22.4 (L) 23.0 - 29.0 mmol/L    Base Excess, Gary -3.0 -3.0 - 3.0 mmol/L    O2 Sat, Gary 86 Not Established %    TC02 (Calc), Gary 23 Not Established mmol/L    O2 Therapy Unknown    Microscopic Urinalysis   Result Value Ref Range    Hyaline Casts, UA 3-5 (A) 0 - 2 /LPF    WBC, UA 3-5 0 - 5 /HPF    RBC, UA 0-2 0 - 4 /HPF    Epithelial Cells, UA 11-20 (A) 0 - 5 /HPF    Bacteria, UA 2+ (A) None Seen /HPF   Basic Metabolic Panel   Result Value Ref Range    Sodium 138 136 - 145 mmol/L    Potassium 3.9 3.5 - 5.1 mmol/L    Chloride 104 99 - 110 mmol/L    CO2 23 21 - 32 mmol/L    Anion Gap 11 3 - 16    Glucose 270 (H) 70 - 99 mg/dL    BUN 32 (H) 7 - 20 mg/dL    CREATININE 1.7 (H) 0.6 - 1.1 mg/dL    GFR Non- 31 (A) >60    GFR  37 (A) >60    Calcium 8.5 8.3 - 10.6 mg/dL   COVID-19   Result Value Ref Range    SARS-CoV-2 Detected (A) Not detected   POCT Glucose   Result Value Ref Range    POC Glucose 282 (H) 70 - 99 mg/dl    Performed on ACCU-CHEK                RADIOLOGY  X-RAYS:  I have reviewed radiologic plain film image(s). ALL OTHER NON-PLAIN FILM IMAGES SUCH AS CT, ULTRASOUND AND MRI HAVE BEEN READ BY THE RADIOLOGIST.   XR CHEST 1 VIEW   Final Result   Impression: Mild hazy bibasilar return precautions have been given. Patient was given scripts for the following medications. I counseled patient how to take these medications. Discharge Medication List as of 9/17/2021  6:34 PM      START taking these medications    Details   ondansetron (ZOFRAN) 4 MG tablet Take 1 tablet by mouth 3 times daily as needed for Nausea or Vomiting, Disp-15 tablet, R-0Normal      HYDROcodone-homatropine (HYCODAN) 5-1.5 MG/5ML syrup Take 5 mLs by mouth every 8 hours as needed (cough) for up to 3 days. , Disp-50 mL, R-0Normal               CLINICAL IMPRESSION  1. Acute upper respiratory infection    2. Encounter for screening for COVID-19    3. Hyperglycemia    4. Dehydration    5. Acute kidney injury ([facility] [address]    6. Non-intractable vomiting with nausea, unspecified vomiting type        Blood pressure 129/75, pulse 91, temperature 99.6 °F (37.6 °C), temperature source Oral, resp. rate 24, height 5' 4\" (1.626 m), weight 186 lb 3.2 oz (84.5 kg), SpO2 99 %, not currently breastfeeding. DISPOSITION  Ran Oconnor was discharged to home in stable condition.     (Please note this note was completed with a voice recognition program.  Efforts were made to edit the dictations but occasionally words are mis-transcribed.)       Maximiliano Flores MD  09/18/21 [id number]

## 2021-09-21 PROBLEM — J12.82 PNEUMONIA DUE TO COVID-19 VIRUS: Status: ACTIVE | Noted: 2021-01-01

## 2021-09-21 PROBLEM — U07.1 PNEUMONIA DUE TO COVID-19 VIRUS: Status: ACTIVE | Noted: 2021-01-01

## 2021-09-21 NOTE — ED TRIAGE NOTES
Patient presents to ED via EMS for c/o increased SOB. Patient states she was dx with covid Friday. Patient voices c/o SOB, body aches, chills, abdominal pain, nausea, fever, loss of appetite, denies emesis, diarrhea, loss of taste/smell. Patient's o2 sat 75% on RA, patient placed on 5L O2 via NC, o2 sat 92%.

## 2021-09-21 NOTE — PROGRESS NOTES
Patient arrived to room 5279 via ED stretcher. Patient arrived with RT at bedside and on 35L on Vapotherm. Patient is A&O x4, dyspnea at rest and exertion. Patient ambulated from stretcher to bed. Patient resting in bed. [facility] verified heart monitor.

## 2021-09-21 NOTE — PLAN OF CARE
Problem: Airway Clearance - Ineffective  Goal: Achieve or maintain patent airway  Outcome: Ongoing     Problem: Gas Exchange - Impaired  Goal: Absence of hypoxia  Outcome: Ongoing     Problem: Gas Exchange - Impaired  Goal: Promote optimal lung function  Outcome: Ongoing     Problem: Breathing Pattern - Ineffective  Goal: Ability to achieve and maintain a regular respiratory rate  Outcome: Ongoing     Problem: Body Temperature -  Risk of, Imbalanced  Goal: Ability to maintain a body temperature within defined limits  Outcome: Ongoing     Problem: Body Temperature -  Risk of, Imbalanced  Goal: Will regain or maintain usual level of consciousness  Outcome: Ongoing     Problem:  Body Temperature -  Risk of, Imbalanced  Goal: Complications related to the disease process, condition or treatment will be avoided or minimized  Outcome: Ongoing     Problem: Isolation Precautions - Risk of Spread of Infection  Goal: Prevent transmission of infection  Outcome: Ongoing     Problem: Nutrition Deficits  Goal: Optimize nutritional status  Outcome: Ongoing     Problem: Risk for Fluid Volume Deficit  Goal: Maintain normal heart rhythm  Outcome: Ongoing     Problem: Risk for Fluid Volume Deficit  Goal: Maintain absence of muscle cramping  Outcome: Ongoing     Problem: Risk for Fluid Volume Deficit  Goal: Maintain normal serum potassium, sodium, calcium, phosphorus, and pH  Outcome: Ongoing     Problem: Loneliness or Risk for Loneliness  Goal: Demonstrate positive use of time alone when socialization is not possible  Outcome: Ongoing     Problem: Fatigue  Goal: Verbalize increase energy and improved vitality  Outcome: Ongoing     Problem: Patient Education: Go to Patient Education Activity  Goal: Patient/Family Education  Outcome: Ongoing

## 2021-09-21 NOTE — ED PROVIDER NOTES
**ADVANCED PRACTICE PROVIDER, I HAVE EVALUATED THIS PATIENT**        [address] ENCOUNTER      Pt Name: Sue Redding  DSV:[id number]  [place] 1965  Date of evaluation: 9/21/2021  Provider: Helene Kayser, PA-C      Chief Complaint:    Chief Complaint   Patient presents with    Positive For Covid-19         Nursing Notes, Past Medical Hx, Past Surgical Hx, Social Hx, Allergies, and Family Hx were all reviewed and agreed with or any disagreements were addressed in the HPI.    HPI: (Location, Duration, Timing, Severity, Quality, Assoc Sx, Context, Modifying factors)  This is a  60 y.o. female who presents to the emergency room with chief complaint of shortness of breath and body aches and chills. She was recently diagnosed with COVID-19 on this past Friday. She was brought in by 27 bards. Rubi says when they got to her home she was saturating at 75% on room air. When she arrived here she was saturating in the low 80s. She was immediately put on 5 L of oxygen and she is saturating at 91 to 88% fluctuating. Pain is some lower abdominal pain. Weakness. Says her  is at home sick with Covid as well. She did not get the vaccine.       PastMedical/Surgical History:      Diagnosis Date    Asthma     Bipolar 1 disorder ([facility] [address]     Chronic back pain     COVID-19 09/17/2021    DDD (degenerative disc disease)     Depression     Diabetes mellitus (HCC)     Fibromyalgia     Hyperlipidemia     Hypertension          Procedure Laterality Date    CARPAL TUNNEL RELEASE      HAND SURGERY      HYSTERECTOMY         Medications:  Previous Medications    ASPIRIN 81 MG CHEWABLE TABLET    Take 81 mg by mouth daily    ATORVASTATIN (LIPITOR) 10 MG TABLET    Take 2 tablets by mouth daily    CARVEDILOL (COREG) 3.125 MG TABLET    Take 1 tablet by mouth daily    CETIRIZINE (ZYRTEC) 10 MG TABLET    Take 10 mg by mouth daily    DOCUSATE SODIUM (COLACE) 100 MG CAPSULE    Take 100 mg by mouth three times daily. EPINEPHRINE (EPIPEN) 0.3 MG/0.3ML SOAJ INJECTION    Inject 0.3 mg into the muscle as needed. Use as directed for allergic reaction    FLUTICASONE (FLONASE) 50 MCG/ACT NASAL SPRAY    2 sprays by Nasal route daily. HYDROCHLOROTHIAZIDE (HYDRODIURIL) 25 MG TABLET    Take 25 mg by mouth daily. INSULIN GLARGINE (LANTUS) 100 UNIT/ML INJECTION VIAL    Inject 43 Units into the skin nightly    INSULIN LISPRO (HUMALOG KWIKPEN) 100 UNIT/ML PEN    Inject 8 Units into the skin 3 times daily (before meals) Please dispense appropriate amount of pen needles    LOSARTAN (COZAAR) 100 MG TABLET    Take 100 mg by mouth daily. MECLIZINE (ANTIVERT) 25 MG TABLET    Take 25 mg by mouth 3 times daily as needed. METFORMIN (GLUCOPHAGE) 500 MG TABLET    Take 2 tablets by mouth 2 times daily (with meals). MOMETASONE FURO-FORMOTEROL FUM (DULERA IN)    Inhale into the lungs    OMEPRAZOLE (PRILOSEC) 20 MG CAPSULE    Take 40 mg by mouth every morning (before breakfast). ONDANSETRON (ZOFRAN) 4 MG TABLET    Take 1 tablet by mouth 3 times daily as needed for Nausea or Vomiting    POLYETHYLENE GLYCOL (GLYCOLAX) PACKET    Take 17 g by mouth 3 times daily. PREGABALIN (LYRICA) 50 MG CAPSULE    Take 50 mg by mouth 2 times daily. .    SODIUM CHLORIDE (OCEAN) 0.65 % NASAL SPRAY    2 sprays by Nasal route as needed for Congestion. SPIRONOLACTONE (ALDACTONE) 50 MG TABLET    Take 50 mg by mouth 2 times daily. SUCRALFATE (CARAFATE) 1 GM TABLET    Take 1 tablet by mouth 4 times daily    SUCRALFATE (CARAFATE) 1 GM TABLET    Take 1 tablet by mouth 4 times daily (with meals and nightly)    TRAZODONE (DESYREL) 50 MG TABLET    Take 50 mg by mouth nightly. Review of Systems:  (2-9 systems needed)  Review of Systems   Constitutional: Positive for chills. Negative for fever. HENT: Negative for congestion, facial swelling and sore throat. Eyes: Negative for discharge and redness. [id number] 09/21/21 0116 09/21/21 0130 09/21/21 0200   BP:  135/80 134/82 132/79   Pulse:  106 96 101   Resp:  (!) 39 28 22   Temp:       TempSrc:       SpO2:  (!) 74% 94% 93%   Weight: 185 lb 3 oz (84 kg)          LABS:  Labs Reviewed   CBC WITH AUTO DIFFERENTIAL - Abnormal; Notable for the following components:       Result Value    RDW 12.3 (*)     Lymphocytes Absolute 0.7 (*)     All other components within normal limits    Narrative:     Performed at:  [facility]  [address] [facility] [address]   Phone [phone]   COMPREHENSIVE METABOLIC PANEL - Abnormal; Notable for the following components:    CO2 18 (*)     Anion Gap 20 (*)     Glucose 371 (*)     BUN 27 (*)     GFR Non- 57 (*)     Total Protein 8.3 (*)     Albumin/Globulin Ratio 1.0 (*)     AST 42 (*)     All other components within normal limits    Narrative:     Performed at:  [facility]  [address] [facility] [address]   Phone [phone]   TROPONIN    Narrative:     Performed at:  [facility]  [address] [facility] [address]   Phone [phone]   URINE RT REFLEX TO CULTURE   PROCALCITONIN        Remainder of labs reviewed and were negative at this time or not returned at the time of this note. RADIOLOGY:   Non-plain film images such as CT, Ultrasound and MRI are read by the radiologist. Kroy Stewart PA-C have directly visualized the radiologic plain film image(s) with the below findings:      Interpretation per the Radiologist below, if available at the time of this note:    XR CHEST PORTABLE   Final Result   Bibasilar opacities are favored to represent pneumonia. XR CHEST 1 VIEW    Result Date: 9/17/2021  XR CHEST 1 VIEW Indication: cough, sob, probable covid COMPARISON: August 2, 2021 Findings: Single frontal view of the chest was obtained.  The heart is normal in size and configuration. Mild hazy bibasilar opacities are noted. These are new in the interval. There is no pneumothorax or effusion. Impression: Mild hazy bibasilar opacities. MEDICAL DECISION MAKING / ED COURSE:      PROCEDURES:   Procedures    None    Patient was given:  Medications   dexamethasone (DECADRON) injection 6 mg (6 mg IntraVENous Given 9/21/21 0136)     Patient on exam throat is clear nonerythematous no exudate. Cardiovascular regular rate rhythm, lungs show bilateral rales at the bases. Chest wall show no tenderness with palpation. Abdomen soft with suprapubic tenderness. No rebound or guarding noted. No CVA or flank tenderness. Bilateral lower extremities show no edema. Patient full range of motion all extremity. Alert oriented x4. Does not appear to be in acute distress. Patient had to have oxygen moved up to 6 L to maintain oxygen saturation at 92%. Lab result from today shows:  CBC within normal limits with a white count of 8.3. CMP shows sodium 139, potassium 4.2, chloride 101 with BUN 27 creatinine 1.0. AST slightly elevated at 42. Troponin less than 0.01. Chest x-ray shows bibasilar opacities are favored to represent pneumonia. Discussed patient x-ray results and lab result from today with her. Discussed admission plan. Bring her in for shortness of breath and hypoxia with COVID-19. I will talk to the hospitalist to see if he wants me to start her on antibiotics while down here in the ED. The patient tolerated their visit well. I evaluated the patient. The physician was available for consultation as needed. The patient and / or the family were informed of the results of any tests, a time was given to answer questions, a plan was proposed and they agreed with plan. CLINICAL IMPRESSION:  1. Dyspnea, unspecified type    2. Hypoxia    3. COVID-19 virus infection    4.  Pneumonia of both lower lobes due to infectious organism        DISPOSITION DISPOSITION Decision To Admit 09/21/2021 02:31:12 AM        PATIENT REFERRED TO:  No follow-up provider specified.     DISCHARGE MEDICATIONS:  New Prescriptions    No medications on file       DISCONTINUED MEDICATIONS:  Discontinued Medications    No medications on file              (Please note the MDM and HPI sections of this note were completed with a voice recognition program.  Efforts were made to edit the dictations but occasionally words are mis-transcribed.)    Electronically signed, Deena Drake PA-C,          Deena Drake PA-C  09/21/21 0234

## 2021-09-21 NOTE — ED NOTES
Pt requested to use bedside commode pt up with standby assist on 6lnc. When pt back to bed 02 sat 74%on 6l nc. Pt states when up out of bed pt is dizzy and sob. Instructed pt to call for help when wanting to get out of bed. Call light in reach. o2 sat now up to 92% on 6l nc will continue to monitor.      French Perez RN  09/21/21 5310

## 2021-09-21 NOTE — LETTER
[facility]  [address] [address]  Phone: [phone]    No name on file. September 23, 2021     Patient: Clara Tsang ([address] [place] [address])   YOB: 1965   Date of Visit: 9/21/2021       To Whom It May Concern: It is my medical opinion that Garrett Mcardle is currently inpatient at [facility].  Case# [id number]    If you have any questions or concerns, please don't hesitate to call.     Sincerely,        Corrie Nuñez RN

## 2021-09-21 NOTE — ED PROVIDER NOTES
I was available for consultation during patient's ED stay. Patient was cared for by KAT. I did not evaluate or participate in patient care.     EKG Interpretation    Interpreted by emergency department physician    Rhythm: sinus tachycardia  Rate: 100-110  Axis: left  Ectopy: none  Conduction: normal  ST Segments: nonspecific changes  T Waves: no acute change  Q Waves: none    Clinical Impression: sinus tachycardia    MD Jeremy Vanegas MD  09/21/21 0111

## 2021-09-21 NOTE — H&P
MD   sucralfate (CARAFATE) 1 GM tablet Take 1 tablet by mouth 4 times daily (with meals and nightly) 8/2/21   Radha Abdi MD   atorvastatin (LIPITOR) 10 MG tablet Take 2 tablets by mouth daily  Patient taking differently: Take 10 mg by mouth daily  7/11/20   Nan Pruitt MD   Mometasone Furo-Formoterol Fum (DULERA IN) Inhale into the lungs    Historical Provider, MD   aspirin 81 MG chewable tablet Take 81 mg by mouth daily    Historical Provider, MD   pregabalin (LYRICA) 200 MG capsule Take 200 mg by mouth 2 times daily. Historical Provider, MD   cetirizine (ZYRTEC) 10 MG tablet Take 10 mg by mouth daily    Historical Provider, MD   sucralfate (CARAFATE) 1 GM tablet Take 1 tablet by mouth 4 times daily 7/6/18   Madyson Braga DO   insulin glargine (LANTUS) 100 UNIT/ML injection vial Inject 43 Units into the skin nightly 2/6/16   Stacey Canela MD   insulin lispro (HUMALOG KWIKPEN) 100 UNIT/ML pen Inject 8 Units into the skin 3 times daily (before meals) Please dispense appropriate amount of pen needles 2/6/16   Stacey Canela MD   metFORMIN (GLUCOPHAGE) 500 MG tablet Take 2 tablets by mouth 2 times daily (with meals). 6/3/14   Gonzales Mcdermott MD   docusate sodium (COLACE) 100 MG capsule Take 100 mg by mouth three times daily. Historical Provider, MD   EPINEPHrine (EPIPEN) 0.3 MG/0.3ML SOAJ injection Inject 0.3 mg into the muscle as needed. Use as directed for allergic reaction    Historical Provider, MD   fluticasone (FLONASE) 50 MCG/ACT nasal spray 2 sprays by Nasal route daily. Historical Provider, MD   hydrochlorothiazide (HYDRODIURIL) 25 MG tablet Take 25 mg by mouth daily. Historical Provider, MD   losartan (COZAAR) 100 MG tablet Take 100 mg by mouth daily. Historical Provider, MD   meclizine (ANTIVERT) 25 MG tablet Take 25 mg by mouth 3 times daily as needed. Historical Provider, MD   omeprazole (PRILOSEC) 20 MG capsule Take 40 mg by mouth every morning (before breakfast). Historical Provider, MD   polyethylene glycol (GLYCOLAX) packet Take 17 g by mouth 3 times daily. Historical Provider, MD   sodium chloride (OCEAN) 0.65 % nasal spray 2 sprays by Nasal route as needed for Congestion. Historical Provider, MD   spironolactone (ALDACTONE) 50 MG tablet Take 50 mg by mouth 2 times daily. Historical Provider, MD   traZODone (DESYREL) 50 MG tablet Take 50 mg by mouth nightly. Historical Provider, MD       Allergies:  Latex, Doxycycline, Flagyl [metronidazole], Ibuprofen, and Iv dye [iodides]    Social History:  The patient currently lives at home with     TOBACCO:   reports that she has never smoked. She has never used smokeless tobacco.  ETOH:   reports previous alcohol use. Family History:  Reviewed in detail and negative for DM, Early CAD, Cancer, CVA. Positive as follows:        Problem Relation Age of Onset    Heart Disease Mother     Heart Disease Father        REVIEW OF SYSTEMS:   Positive for as noted in the HPI. All other systems reviewed and negative. PHYSICAL EXAM:    BP (!) 140/75   Pulse 97   Temp 98.9 °F (37.2 °C) (Oral)   Resp (!) 34   Wt 185 lb 3 oz (84 kg)   SpO2 95%   BMI 31.79 kg/m²     General appearance: No apparent distress appears stated age and cooperative. HEENT Normal cephalic, atraumatic without obvious deformity. Pupils equal, round, and reactive to light. Extra ocular muscles intact. Conjunctivae/corneas clear. Neck: Supple, No jugular venous distention/bruits. Trachea midline without thyromegaly or adenopathy with full range of motion. Lungs: diminished BS BL  Heart: Regular rate and rhythm with Normal S1/S2 without murmurs  Abdomen: Soft, non-tender or non-distended without rigidity or guarding and positive bowel sounds all four quadrants. Extremities: No clubbing, cyanosis, or edema bilaterally. Full range of motion without deformity  Skin: Skin color, texture, turgor normal.  No rashes or lesions.   Neurologic: Alert and oriented X 3, neurovascularly intact with sensory/motor intact upper extremities/lower extremities, bilaterally. Cranial nerves: II-XII intact, grossly non-focal.  Mental status: Alert, oriented, thought content appropriate. Capillary Refill: Acceptable  < 3 seconds  Peripheral Pulses: +3 Easily felt, not easily obliterated with pressure      CXR:  I have reviewed the CXR with the following interpretation: bibasilar opacities    EKG:  I have reviewed the EKG with the following interpretation: sinus tachycardia    CBC   Recent Labs     09/21/21 0041   WBC 8.3   HGB 12.7   HCT 38.4         RENAL  Recent Labs     09/21/21 0041      K 4.2      CO2 18*   BUN 27*   CREATININE 1.0     LFT'S  Recent Labs     09/21/21 0041   AST 42*   ALT 20   BILITOT 0.8   ALKPHOS 94     COAG  No results for input(s): INR in the last 72 hours.   CARDIAC ENZYMES  Recent Labs     09/21/21 0041   TROPONINI <0.01       U/A:    Lab Results   Component Value Date    COLORU Yellow 09/17/2021    WBCUA 3-5 09/17/2021    RBCUA 0-2 09/17/2021    BACTERIA 2+ 09/17/2021    CLARITYU Clear 09/17/2021    SPECGRAV 1.020 09/17/2021    LEUKOCYTESUR Negative 09/17/2021    BLOODU TRACE-INTACT 09/17/2021    GLUCOSEU 100 09/17/2021       ABG  No results found for: FBZ3OIK, BEART, W1KXSYHN, PHART, THGBART, TPZ8AMJ, PO2ART, MHC0MVD      PHYSICIANS CERTIFICATION:    I certify that Gladis Pike is expected to be hospitalized for more than 2 midnights based on the following assessment and plan:      ASSESSMENT/PLAN:    COVID-19 pneumonia  Decadron 20 mg daily for 5 days, followed by 10mg for 5 days  Remdesivir 5 days  baricitnib  lovenox bid for anticoagulation  Encourage IS and proning    Acute respiratory failure with hypoxia  Titrate oxygen to keep saturations above 90%  Likely secondary to COVID-19 pneumonia    Hypertension  Continue home medications    Hyperlipidemia  Continue home medications    Diabetes mellitus type 2  Hold home medications  Lantus and sliding scale  Carb controlled diet    DVT Prophylaxis: lovenox  Diet: ADULT DIET; Regular; 3 carb choices (45 gm/meal); Low Sodium (2 gm)  Code Status: Full Code  PT/OT Eval Status: Will need    Dispo - Inpt    I spent greater than 30 mins of critical care time with patient in regards to her [address] MD Rex    Thank you [address] for the opportunity to be involved in this patient's care. If you have any questions or concerns please feel free to contact me at [phone].

## 2021-09-21 NOTE — ED NOTES
SBAR to Aman Bonilla, all questions answered. Patient has 22G IV in forearm left, condition patent and no redness without any meds infusing. Patient was on cardiac monitor, breathing regular on 40L high flow O2, no distress, is person, place and time. Sent to room 5279 via stretcher.       Alex Elias RN  09/21/21 9780

## 2021-09-21 NOTE — CARE COORDINATION
INITIAL CASE MANAGEMENT ASSESSMENT    Reviewed chart, met with patient to assess possible discharge needs. Explained Case Management role/services. Living Situation: Confirmed address. Lives with her  in a single family two-story home. Three steps with railing to enter the home. No pets in the home. ADLs: Independent. DME: [employer]. PT/OT Recs: None. TBD. Active Services: None. Provided [facility] and SNF list.      Transportation: Active . Her  will transport her at discharge. Medications: [facility] [address]. Denies any difficulties affording her medications. PCP: Anupama Davis      HD/PD: No.     PLAN/COMMENTS: Return home. Spouse will transport at discharge. Advance Care Planning discussion completed. The Plan for Transition of Care is related to the following treatment goals: return home. The Patient was provided with a choice of provider and agrees   with the discharge plan. [x] Yes [] No    Freedom of choice list was provided with basic dialogue that supports the patient's individualized plan of care/goals, treatment preferences and shares the quality data associated with the providers. [x] Yes [] No    SW/CM provided contact information for patient or family to call with any questions. SW/CM will follow and assist as needed.

## 2021-09-21 NOTE — ED NOTES
Assisted patient up to the bedside commode. Patient's linen changed on her bed.      Kash Moody RN  09/21/21 6320

## 2021-09-21 NOTE — PROGRESS NOTES
Medication Reconciliation    List of medications for Stephania Grapes is currently taking is in progress.      Source of Information:   Epic records     Allergies  Allergy list not thoroughly reviewed with patient at this time  Allergies listed in Epic as follows: Latex, Doxycycline, Flagyl [metronidazole], Ibuprofen, and Iv dye [iodides]       Notes Regarding Home Medications:   Unable to speak with patient due to Covid status  Updated med list based on dispense history  Added albuterol, azelastine, vitamin D, Y91, Trulicity, premarin, ketotifen, latanoprost, magox, montelukast, rizatriptan, theophylline, spiriva, and venlafaxine      Patel Buitrago, Pharmacy intern  9/21/2021 12:17 PM

## 2021-09-21 NOTE — PROGRESS NOTES
4 Eyes Skin Assessment     NAME:  Mara De La Paz  YOB: 1965  MEDICAL RECORD NUMBER:  [id number]    The patient is being assess for  Admission    I agree that 2 RN's have performed a thorough Head to Toe Skin Assessment on the patient. ALL assessment sites listed below have been assessed. Areas assessed by both nurses:    Head, Face, Ears, Shoulders, Back, Chest, Arms, Elbows, Hands, Sacrum. Buttock, Coccyx, Ischium and Legs. Feet and Heels        Does the Patient have a Wound?  No noted wound(s)       Maurice Prevention initiated:  No   Wound Care Orders initiated:  No    Pressure Injury (Stage 3,4, Unstageable, DTI, NWPT, and Complex wounds) if present place consult order under [de-identified] No    New and Established Ostomies if present place consult order under : No      Nurse 1 eSignature: Electronically signed by Gale Walsh RN on 9/21/21 at 3:44 PM EDT    **SHARE this note so that the co-signing nurse is able to place an eSignature**    Nurse 2 eSignature: Electronically signed by Tasha Deal RN on 9/21/21 at 3:48 PM EDT

## 2021-09-22 NOTE — PLAN OF CARE
Problem: Airway Clearance - Ineffective  Goal: Achieve or maintain patent airway  9/22/2021 1024 by Ab Pineda RN  Outcome: Ongoing     Problem: Gas Exchange - Impaired  Goal: Absence of hypoxia  9/22/2021 1024 by Ab Pineda RN  Outcome: Ongoing     Problem: Gas Exchange - Impaired  Goal: Promote optimal lung function  9/22/2021 1024 by Ab Pineda RN  Outcome: Ongoing     Problem: Breathing Pattern - Ineffective  Goal: Ability to achieve and maintain a regular respiratory rate  9/22/2021 1024 by Ab Pineda RN  Outcome: Ongoing     Problem: Body Temperature -  Risk of, Imbalanced  Goal: Ability to maintain a body temperature within defined limits  9/22/2021 1024 by Ab Pineda RN  Outcome: Ongoing     Problem: Body Temperature -  Risk of, Imbalanced  Goal: Will regain or maintain usual level of consciousness  9/22/2021 1024 by Ab Pineda RN  Outcome: Ongoing     Problem:  Body Temperature -  Risk of, Imbalanced  Goal: Complications related to the disease process, condition or treatment will be avoided or minimized  9/22/2021 1024 by Ab Pineda RN  Outcome: Ongoing     Problem: Isolation Precautions - Risk of Spread of Infection  Goal: Prevent transmission of infection  9/22/2021 1024 by Ab Pineda RN  Outcome: Ongoing     Problem: Nutrition Deficits  Goal: Optimize nutritional status  9/22/2021 1024 by Ab Pineda RN  Outcome: Ongoing     Problem: Risk for Fluid Volume Deficit  Goal: Maintain normal heart rhythm  9/22/2021 1024 by Ab Pineda RN  Outcome: Ongoing     Problem: Risk for Fluid Volume Deficit  Goal: Maintain absence of muscle cramping  9/22/2021 1024 by Ab Pineda RN  Outcome: Ongoing     Problem: Risk for Fluid Volume Deficit  Goal: Maintain normal serum potassium, sodium, calcium, phosphorus, and pH  9/22/2021 1024 by Ab Pineda RN  Outcome: Ongoing     Problem: Loneliness or Risk for Loneliness  Goal: Demonstrate positive use of time alone when socialization is not possible  9/22/2021 1024 by Gale Walsh RN  Outcome: Ongoing     Problem: Fatigue  Goal: Verbalize increase energy and improved vitality  9/22/2021 1024 by Gale Walsh RN  Outcome: Ongoing     Problem: Patient Education: Go to Patient Education Activity  Goal: Patient/Family Education  9/22/2021 1024 by Gale Walsh RN  Outcome: Ongoing

## 2021-09-22 NOTE — PROGRESS NOTES
Hospitalist Progress Note      PCP: Hailee Garza    Date of Admission: 9/21/2021    Chief Complaint: SOB    Hospital Course: The patient is a 60 y.o. female who presents to [facility] with shortness of breath. Patient states that she has been feeling symptomatic since Friday September 17. She works at an [employer] and comes in contact with numerous people she states many people at work have been diagnosed with COVID-19 recently. In the emergency department patient was found to be afebrile, tachypneic, tachycardic and saturating 75% on room air. Labs revealed an elevated glucose at 371. Chest x-ray shows bibasilar opacities. Patient tested positive for COVID-19 in the emergency department. Started on Decadron. Pharmacy consulted to dose remdesivir along with baricitinib. While in the ER she desatted on 6 L and required Vapotherm up to 50 L at one point. Subjective: Patient seen and examined. Patient's oxygen demand down to 35 L at 90% FiO2. Sugar still uncontrolled. Patient states she takes 45 units Lantus at night along with 15 units with meals. Adjustments to insulin regimen done.       Medications:  Reviewed    Infusion Medications    sodium chloride      dextrose       Scheduled Medications    insulin lispro  0-18 Units SubCUTAneous TID WC    insulin lispro  0-9 Units SubCUTAneous Nightly    [START ON 9/23/2021] baricitinib  2 mg Oral Daily    insulin glargine  50 Units SubCUTAneous BID    insulin lispro  20 Units SubCUTAneous TID WC    insulin regular  10 Units IntraVENous Once    aspirin  81 mg Oral Daily    carvedilol  3.125 mg Oral Daily    fluticasone  2 spray Nasal Daily    losartan  100 mg Oral Daily    traZODone  50 mg Oral Nightly    sodium chloride flush  5-40 mL IntraVENous 2 times per day    enoxaparin  30 mg SubCUTAneous BID    atorvastatin  10 mg Oral Daily    pregabalin  200 mg Oral BID    dexamethasone  20 mg IntraVENous QAM    [START ON 9/26/2021] dexamethasone  10 mg IntraVENous QAM    remdesivir IVPB  100 mg IntraVENous Q24H     PRN Meds: sodium chloride flush, sodium chloride, ondansetron **OR** ondansetron, acetaminophen **OR** acetaminophen, glucose, dextrose, glucagon (rDNA), dextrose, guaiFENesin-dextromethorphan, sodium chloride      Intake/Output Summary (Last 24 hours) at 9/22/2021 1353  Last data filed at 9/22/2021 1300  Gross per 24 hour   Intake 851.03 ml   Output 400 ml   Net 451.03 ml       Physical Exam Performed:    BP 98/64   Pulse 77   Temp 97.3 °F (36.3 °C) (Axillary)   Resp 22   Ht 5' 3\" (1.6 m)   Wt 187 lb 13.3 oz (85.2 kg)   SpO2 94%   BMI 33.27 kg/m²     General appearance: No apparent distress, appears stated age and cooperative. HEENT: Pupils equal, round, and reactive to light. Conjunctivae/corneas clear. Neck: Supple, with full range of motion. No jugular venous distention. Trachea midline. Respiratory:  Normal respiratory effort. Clear to auscultation, bilaterally without Rales/Wheezes/Rhonchi. Cardiovascular: Regular rate and rhythm with normal S1/S2 without murmurs, rubs or gallops. Abdomen: Soft, non-tender, non-distended with normal bowel sounds. Musculoskeletal: No clubbing, cyanosis or edema bilaterally. Full range of motion without deformity. Skin: Skin color, texture, turgor normal.  No rashes or lesions. Neurologic:  Neurovascularly intact without any focal sensory/motor deficits.  Cranial nerves: II-XII intact, grossly non-focal.  Psychiatric: Alert and oriented, thought content appropriate, normal insight  Capillary Refill: Brisk,< 3 seconds   Peripheral Pulses: +2 palpable, equal bilaterally       Labs:   Recent Labs     09/21/21  0041 09/21/21  1515 09/22/21  0629   WBC 8.3 5.7 5.7   HGB 12.7 11.8* 12.9   HCT 38.4 35.1* 38.7    306 346     Recent Labs     09/21/21  0041 09/21/21  1515 09/22/21  0629    137 136   K 4.2 4.4 4.4    96* 96*   CO2 18* 23 21   BUN 27* 30* 51* CREATININE 1.0 1.1 1.5*   CALCIUM 9.6 9.2 9.5     Recent Labs     09/21/21  0041 09/21/21  1515 09/22/21  0629   AST 42* 35 26   ALT 20 19 18   BILITOT 0.8 0.6 0.7   ALKPHOS 94 90 89     No results for input(s): INR in the last 72 hours. Recent Labs     09/21/21  0041   TROPONINI <0.01       Urinalysis:      Lab Results   Component Value Date    NITRU Negative 09/21/2021    WBCUA 1 09/21/2021    BACTERIA 1+ 09/21/2021    RBCUA 2 09/21/2021    BLOODU Negative 09/21/2021    SPECGRAV >1.030 09/21/2021    GLUCOSEU >=1000 09/21/2021       Radiology:  XR CHEST PORTABLE   Final Result   Bibasilar opacities are favored to represent pneumonia. Assessment/Plan:    COVID-19 pneumonia  Decadron 20 mg daily for 5 days, followed by 10mg for 5 days  Remdesivir 5 days  baricitnib  lovenox bid for anticoagulation  Encourage IS and proning     Acute respiratory failure with hypoxia  Titrate oxygen to keep saturations above 90%  Likely secondary to COVID-19 pneumonia     Hypertension  Continue home medications     Hyperlipidemia  Continue home medications     Uncontrolled diabetes mellitus type 2  Hold home medications  Lantus and sliding scale, increased  Add on mealtime  Carb controlled diet    DVT Prophylaxis: Lovenox  Diet: ADULT DIET; Regular; 3 carb choices (45 gm/meal); Low Sodium (2 gm)  Code Status: Full Code    PT/OT Eval Status:  Will need    Jessika Colby MD

## 2021-09-22 NOTE — PLAN OF CARE
Problem: Airway Clearance - Ineffective  Goal: Achieve or maintain patent airway  9/22/2021 0157 by Gordon Melo RN  Outcome: Ongoing     Problem: Gas Exchange - Impaired  Goal: Absence of hypoxia  9/22/2021 0157 by Gordon Melo RN  Outcome: Ongoing     Problem: Gas Exchange - Impaired  Goal: Promote optimal lung function  9/22/2021 0157 by Gordon Melo RN  Outcome: Ongoing     Problem: Breathing Pattern - Ineffective  Goal: Ability to achieve and maintain a regular respiratory rate  9/22/2021 0157 by Gordon Melo RN  Outcome: Ongoing     Problem: Body Temperature -  Risk of, Imbalanced  Goal: Ability to maintain a body temperature within defined limits  9/22/2021 0157 by Gordon Melo RN  Outcome: Ongoing     Problem: Body Temperature -  Risk of, Imbalanced  Goal: Will regain or maintain usual level of consciousness  9/22/2021 0157 by Gordon Melo RN  Outcome: Ongoing     Problem:  Body Temperature -  Risk of, Imbalanced  Goal: Complications related to the disease process, condition or treatment will be avoided or minimized  9/22/2021 0157 by Gordon Melo RN  Outcome: Ongoing     Problem: Isolation Precautions - Risk of Spread of Infection  Goal: Prevent transmission of infection  9/22/2021 0157 by Gordon Melo RN  Outcome: Ongoing     Problem: Nutrition Deficits  Goal: Optimize nutritional status  9/22/2021 0157 by Gordon Melo RN  Outcome: Ongoing     Problem: Risk for Fluid Volume Deficit  Goal: Maintain normal heart rhythm  9/22/2021 0157 by Gordon Melo RN  Outcome: Ongoing     Problem: Risk for Fluid Volume Deficit  Goal: Maintain absence of muscle cramping  9/22/2021 0157 by Gordon Melo RN  Outcome: Ongoing     Problem: Risk for Fluid Volume Deficit  Goal: Maintain normal serum potassium, sodium, calcium, phosphorus, and pH  9/22/2021 0157 by Gordon Melo RN  Outcome: Ongoing     Problem: Loneliness or Risk for Loneliness  Goal: Demonstrate positive use of time alone when socialization is not possible  9/22/2021 0157 by Patricia Johnston RN  Outcome: Ongoing     Problem: Fatigue  Goal: Verbalize increase energy and improved vitality  9/22/2021 0157 by Patricia Johnston RN  Outcome: Ongoing     Problem: Patient Education: Go to Patient Education Activity  Goal: Patient/Family Education  9/22/2021 0157 by Patricia Johnston RN  Outcome: Ongoing

## 2021-09-23 NOTE — CARE COORDINATION
Per chart review, patient requiring 50LO2 with Vapotherm. Follow for PT/OT, DME, and discharge needs. Plan: home with spouse.    Marcianne Mcardle, MSW, MARYW, Social Work/Case Management   [phone]  Electronically signed by Marcianne Mcardle, MSW, MARYW on 9/23/2021 at 9:34 AM

## 2021-09-23 NOTE — PROGRESS NOTES
Received call from [address] that pt 02 sat was 75%. Went to bedside and pt vapotherm off. Place vapotherm on and pt 02 sat up to 91%.  Electronically signed by Nely Harden RN on 9/23/2021 at 8:50 AM

## 2021-09-23 NOTE — PROGRESS NOTES
Hospitalist Progress Note      PCP: Alberta Winchester    Date of Admission: 9/21/2021    Chief Complaint: SOB    Hospital Course: The patient is a 60 y.o. female who presents to [facility] with shortness of breath. Patient states that she has been feeling symptomatic since Friday September 17. She works at an [employer] and comes in contact with numerous people she states many people at work have been diagnosed with COVID-19 recently. In the emergency department patient was found to be afebrile, tachypneic, tachycardic and saturating 75% on room air. Labs revealed an elevated glucose at 371. Chest x-ray shows bibasilar opacities. Patient tested positive for COVID-19 in the emergency department. Started on Decadron. Pharmacy consulted to dose remdesivir along with baricitinib. While in the ER she desatted on 6 L and required Vapotherm up to 50 L at one point. 9/22 Patient's oxygen demand down to 35 L at 90% FiO2. Sugar still uncontrolled. Patient states she takes 45 units Lantus at night along with 15 units with meals. Adjustments to insulin regimen done. Subjective: Patient seen and examined. Patient is oxygenating just fine on current requirements. Sugar still hard to control so we will increase both Lantus along with mealtime insulin.       Medications:  Reviewed    Infusion Medications    sodium chloride      dextrose       Scheduled Medications    [START ON 9/26/2021] dexamethasone  10 mg IntraVENous Daily    insulin glargine  55 Units SubCUTAneous BID    insulin lispro  25 Units SubCUTAneous TID WC    insulin lispro  0-18 Units SubCUTAneous TID WC    insulin lispro  0-9 Units SubCUTAneous Nightly    baricitinib  2 mg Oral Daily    aspirin  81 mg Oral Daily    carvedilol  3.125 mg Oral Daily    fluticasone  2 spray Nasal Daily    losartan  100 mg Oral Daily    traZODone  50 mg Oral Nightly    sodium chloride flush  5-40 mL IntraVENous 2 times per day    enoxaparin  30 mg SubCUTAneous BID    atorvastatin  10 mg Oral Daily    pregabalin  200 mg Oral BID    dexamethasone  20 mg IntraVENous QAM    remdesivir IVPB  100 mg IntraVENous Q24H     PRN Meds: sodium chloride flush, sodium chloride, ondansetron **OR** ondansetron, acetaminophen **OR** acetaminophen, glucose, dextrose, glucagon (rDNA), dextrose, guaiFENesin-dextromethorphan, sodium chloride      Intake/Output Summary (Last 24 hours) at 9/23/2021 1310  Last data filed at 9/23/2021 1150  Gross per 24 hour   Intake 600 ml   Output 1100 ml   Net -500 ml       Physical Exam Performed:    /75   Pulse 80   Temp 97.6 °F (36.4 °C) (Oral)   Resp 20   Ht 5' 3\" (1.6 m)   Wt 188 lb 7.9 oz (85.5 kg)   SpO2 100%   BMI 33.39 kg/m²     General appearance: No apparent distress, appears stated age and cooperative. HEENT: Pupils equal, round, and reactive to light. Conjunctivae/corneas clear. Neck: Supple, with full range of motion. No jugular venous distention. Trachea midline. Respiratory:  Normal respiratory effort. Clear to auscultation, bilaterally without Rales/Wheezes/Rhonchi. Cardiovascular: Regular rate and rhythm with normal S1/S2 without murmurs, rubs or gallops. Abdomen: Soft, non-tender, non-distended with normal bowel sounds. Musculoskeletal: No clubbing, cyanosis or edema bilaterally. Full range of motion without deformity. Skin: Skin color, texture, turgor normal.  No rashes or lesions. Neurologic:  Neurovascularly intact without any focal sensory/motor deficits.  Cranial nerves: II-XII intact, grossly non-focal.  Psychiatric: Alert and oriented, thought content appropriate, normal insight  Capillary Refill: Brisk,< 3 seconds   Peripheral Pulses: +2 palpable, equal bilaterally       Labs:   Recent Labs     09/21/21  1515 09/22/21  0629 09/23/21  0613   WBC 5.7 5.7 15.6*   HGB 11.8* 12.9 12.7   HCT 35.1* 38.7 38.5    346 414     Recent Labs     09/21/21  1515 09/22/21 0629 09/23/21  0613    136 133*   K 4.4 4.4 4.5   CL 96* 96* 98*   CO2 23 21 17*   BUN 30* 51* 53*   CREATININE 1.1 1.5* 1.3*   CALCIUM 9.2 9.5 9.6     Recent Labs     09/21/21  1515 09/22/21  0629 09/23/21  0613   AST 35 26 26   ALT 19 18 18   BILITOT 0.6 0.7 0.5   ALKPHOS 90 89 90     No results for input(s): INR in the last 72 hours. Recent Labs     09/21/21  0041   TROPONINI <0.01       Urinalysis:      Lab Results   Component Value Date    NITRU Negative 09/21/2021    WBCUA 1 09/21/2021    BACTERIA 1+ 09/21/2021    RBCUA 2 09/21/2021    BLOODU Negative 09/21/2021    SPECGRAV >1.030 09/21/2021    GLUCOSEU >=1000 09/21/2021       Radiology:  XR CHEST PORTABLE   Final Result   Bibasilar opacities are favored to represent pneumonia. Assessment/Plan:    COVID-19 pneumonia  Decadron 20 mg daily for 5 days, followed by 10mg for 5 days  Remdesivir 5 days  baricitnib  lovenox bid for anticoagulation  Encourage IS and proning     Acute respiratory failure with hypoxia  Titrate oxygen to keep saturations above 90%  Likely secondary to COVID-19 pneumonia     Hypertension  Continue home medications     Hyperlipidemia  Continue home medications     Uncontrolled diabetes mellitus type 2  Hold home medications  Lantus and sliding scale, increased once again  mealtime  Carb controlled diet    DVT Prophylaxis: Lovenox  Diet: ADULT DIET; Regular; 3 carb choices (45 gm/meal); Low Sodium (2 gm)  Code Status: Full Code    PT/OT Eval Status:  Will need    Billy Cao MD

## 2021-09-23 NOTE — PLAN OF CARE
Problem: Gas Exchange - Impaired  Goal: Absence of hypoxia  9/22/2021 2350 by Ryan Judd RN  Outcome: Ongoing     Problem: Breathing Pattern - Ineffective  Goal: Ability to achieve and maintain a regular respiratory rate  9/22/2021 2350 by Ryan Judd RN  Outcome: Ongoing     Problem: Isolation Precautions - Risk of Spread of Infection  Goal: Prevent transmission of infection  9/22/2021 2350 by Ryan Judd RN  Outcome: Ongoing     Problem:  Body Temperature -  Risk of, Imbalanced  Goal: Complications related to the disease process, condition or treatment will be avoided or minimized  9/22/2021 2350 by Ryan Judd RN  Outcome: Ongoing     Problem: Loneliness or Risk for Loneliness  Goal: Demonstrate positive use of time alone when socialization is not possible  9/22/2021 2350 by Ryan Judd RN  Outcome: Ongoing

## 2021-09-24 NOTE — PROGRESS NOTES
AM [id number]-95. Concerns with the amt of insulin last night and drop in blood sugar. Dr Ranjan Brennan notified to double check am insulin orders.  Electronically signed by Naveed Boswell RN on 9/24/2021 at 8:33 AM

## 2021-09-24 NOTE — PROGRESS NOTES
Hospitalist Progress Note      PCP: Andria Serra    Date of Admission: 9/21/2021    Chief Complaint: SOB    Hospital Course: The patient is a 60 y.o. female who presents to [facility] with shortness of breath. Patient states that she has been feeling symptomatic since Friday September 17. She works at an [employer] and comes in contact with numerous people she states many people at work have been diagnosed with COVID-19 recently. In the emergency department patient was found to be afebrile, tachypneic, tachycardic and saturating 75% on room air. Labs revealed an elevated glucose at 371. Chest x-ray shows bibasilar opacities. Patient tested positive for COVID-19 in the emergency department. Started on Decadron. Pharmacy consulted to dose remdesivir along with baricitinib. While in the ER she desatted on 6 L and required Vapotherm up to 50 L at one point. 9/22 Patient's oxygen demand down to 35 L at 90% FiO2. Sugar still uncontrolled. Patient states she takes 45 units Lantus at night along with 15 units with meals. Adjustments to insulin regimen done. 9/24 Patient is oxygenating just fine on current requirements. Sugar still hard to control so we will increase both Lantus along with mealtime insulin. Subjective: Patient seen and examined. Overnight patient was given 20 units of IV insulin with a drop in glucose from 516 down to 95. Patient's sugars back up to 235 despite 60 units of Lantus.   We will continue with high-dose Humalog with meals along with sliding scale      Medications:  Reviewed    Infusion Medications    sodium chloride      dextrose       Scheduled Medications    [START ON 9/26/2021] dexamethasone  10 mg IntraVENous Daily    insulin glargine  60 Units SubCUTAneous BID    insulin lispro  30 Units SubCUTAneous TID WC    insulin lispro  0-18 Units SubCUTAneous TID WC    insulin lispro  0-9 Units SubCUTAneous Nightly    baricitinib  2 mg Oral Daily  aspirin  81 mg Oral Daily    carvedilol  3.125 mg Oral Daily    fluticasone  2 spray Nasal Daily    losartan  100 mg Oral Daily    traZODone  50 mg Oral Nightly    sodium chloride flush  5-40 mL IntraVENous 2 times per day    enoxaparin  30 mg SubCUTAneous BID    atorvastatin  10 mg Oral Daily    pregabalin  200 mg Oral BID    dexamethasone  20 mg IntraVENous QAM    remdesivir IVPB  100 mg IntraVENous Q24H     PRN Meds: sodium chloride flush, sodium chloride, ondansetron **OR** ondansetron, acetaminophen **OR** acetaminophen, glucose, dextrose, glucagon (rDNA), dextrose, guaiFENesin-dextromethorphan, sodium chloride      Intake/Output Summary (Last 24 hours) at 9/24/2021 0835  Last data filed at 9/24/2021 0600  Gross per 24 hour   Intake 1851.81 ml   Output 2500 ml   Net -648.19 ml       Physical Exam Performed:    /73   Pulse 81   Temp 98.4 °F (36.9 °C) (Oral)   Resp 22   Ht 5' 3\" (1.6 m)   Wt 189 lb 9.5 oz (86 kg)   SpO2 93%   BMI 33.59 kg/m²     General appearance: No apparent distress, appears stated age and cooperative. HEENT: Pupils equal, round, and reactive to light. Conjunctivae/corneas clear. Neck: Supple, with full range of motion. No jugular venous distention. Trachea midline. Respiratory:  Normal respiratory effort. Clear to auscultation, bilaterally without Rales/Wheezes/Rhonchi. Cardiovascular: Regular rate and rhythm with normal S1/S2 without murmurs, rubs or gallops. Abdomen: Soft, non-tender, non-distended with normal bowel sounds. Musculoskeletal: No clubbing, cyanosis or edema bilaterally. Full range of motion without deformity. Skin: Skin color, texture, turgor normal.  No rashes or lesions. Neurologic:  Neurovascularly intact without any focal sensory/motor deficits.  Cranial nerves: II-XII intact, grossly non-focal.  Psychiatric: Alert and oriented, thought content appropriate, normal insight  Capillary Refill: Brisk,< 3 seconds   Peripheral Pulses: +2 palpable, equal bilaterally       Labs:   Recent Labs     09/21/21  1515 09/22/21  0629 09/23/21  0613   WBC 5.7 5.7 15.6*   HGB 11.8* 12.9 12.7   HCT 35.1* 38.7 38.5    346 414     Recent Labs     09/22/21  0629 09/23/21  0613 09/23/21  2220    133* 132*   K 4.4 4.5 4.3   CL 96* 98* 97*   CO2 21 17* 19*   BUN 51* 53* 50*   CREATININE 1.5* 1.3* 1.1   CALCIUM 9.5 9.6 9.1   PHOS  --   --  2.7     Recent Labs     09/21/21  1515 09/22/21  0629 09/23/21  0613   AST 35 26 26   ALT 19 18 18   BILITOT 0.6 0.7 0.5   ALKPHOS 90 89 90     No results for input(s): INR in the last 72 hours. No results for input(s): Curlie Lat in the last 72 hours. Urinalysis:      Lab Results   Component Value Date    NITRU Negative 09/21/2021    WBCUA 1 09/21/2021    BACTERIA 1+ 09/21/2021    RBCUA 2 09/21/2021    BLOODU Negative 09/21/2021    SPECGRAV >1.030 09/21/2021    GLUCOSEU >=1000 09/21/2021       Radiology:  XR CHEST PORTABLE   Final Result   Bibasilar opacities are favored to represent pneumonia. Assessment/Plan:    COVID-19 pneumonia  Decadron 20 mg daily for 5 days, followed by 10mg for 5 days  Remdesivir 5 days  baricitnib  lovenox bid for anticoagulation  Encourage IS and proning     Acute respiratory failure with hypoxia  Titrate oxygen to keep saturations above 90%  Likely secondary to COVID-19 pneumonia     Hypertension  Continue home medications     Hyperlipidemia  Continue home medications     Uncontrolled diabetes mellitus type 2  Hold home medications  Lantus and sliding scale, increased once again  mealtime  Carb controlled diet  Given 20 units of IV insulin overnight  Hard to control with high-dose Decadron    DVT Prophylaxis: Lovenox  Diet: ADULT DIET; Regular; 3 carb choices (45 gm/meal); Low Sodium (2 gm)  Code Status: Full Code    PT/OT Eval Status:  Will need    Nathan Fong MD

## 2021-09-24 NOTE — PROGRESS NOTES
Called regarding elevated BG level this evening. Lantus already increased. Appears to be in DKA with anion gap and acidosis. Getting stat renal panel and ketones to evaluate for possible insulin gtt. RF: started on large dose of steroids, A1c 8.6 in July 2020. JORGE Albrecht CNP 9/23/2021 8:58 PM     Labs drawn by myself. 20 units IV insulin ordered x1. JORGE Albrecht CNP 9/23/2021 10:29 PM     Not in DKA. Will continue HSS and monitor BG closely. Responded to IV insulin well.     JORGE Albrecht CNP 9/23/2021 11:28 PM

## 2021-09-25 NOTE — PROGRESS NOTES
Hospitalist Progress Note      PCP: Zander Maddox    Date of Admission: 9/21/2021    Chief Complaint: SOB    Hospital Course: The patient is a 60 y.o. female who presents to [facility] with shortness of breath. Patient states that she has been feeling symptomatic since Friday September 17. She works at an [employer] and comes in contact with numerous people she states many people at work have been diagnosed with COVID-19 recently. In the emergency department patient was found to be afebrile, tachypneic, tachycardic and saturating 75% on room air. Labs revealed an elevated glucose at 371. Chest x-ray shows bibasilar opacities. Patient tested positive for COVID-19 in the emergency department. Started on Decadron. Pharmacy consulted to dose remdesivir along with baricitinib. While in the ER she desatted on 6 L and required Vapotherm up to 50 L at one point. 9/22 Patient's oxygen demand down to 35 L at 90% FiO2. Sugar still uncontrolled. Patient states she takes 45 units Lantus at night along with 15 units with meals. Adjustments to insulin regimen done. 9/24 Patient is oxygenating just fine on current requirements. Sugar still hard to control so we will increase both Lantus along with mealtime insulin. 9/25 Overnight patient was given 20 units of IV insulin with a drop in glucose from 516 down to 95. Patient's sugars back up to 235 despite 60 units of Lantus. We will continue with high-dose Humalog with meals along with sliding scale    Subjective: Patient seen and examined. Down to 35L. Feeling pretty good. Sugars better controlled.        Medications:  Reviewed    Infusion Medications    sodium chloride      dextrose       Scheduled Medications    [START ON 9/26/2021] dexamethasone  10 mg IntraVENous Daily    insulin glargine  60 Units SubCUTAneous BID    insulin lispro  30 Units SubCUTAneous TID WC    insulin lispro  0-18 Units SubCUTAneous TID WC    insulin lispro  0-9 Units SubCUTAneous Nightly    baricitinib  2 mg Oral Daily    aspirin  81 mg Oral Daily    carvedilol  3.125 mg Oral Daily    fluticasone  2 spray Nasal Daily    losartan  100 mg Oral Daily    traZODone  50 mg Oral Nightly    sodium chloride flush  5-40 mL IntraVENous 2 times per day    enoxaparin  30 mg SubCUTAneous BID    atorvastatin  10 mg Oral Daily    pregabalin  200 mg Oral BID    dexamethasone  20 mg IntraVENous QAM    remdesivir IVPB  100 mg IntraVENous Q24H     PRN Meds: sodium chloride flush, sodium chloride, ondansetron **OR** ondansetron, acetaminophen **OR** acetaminophen, glucose, dextrose, glucagon (rDNA), dextrose, guaiFENesin-dextromethorphan, sodium chloride      Intake/Output Summary (Last 24 hours) at 9/25/2021 0906  Last data filed at 9/24/2021 2138  Gross per 24 hour   Intake 660 ml   Output --   Net 660 ml       Physical Exam Performed:    /65   Pulse 72   Temp 98.4 °F (36.9 °C) (Oral)   Resp 22   Ht 5' 3\" (1.6 m)   Wt 186 lb 8.2 oz (84.6 kg)   SpO2 99%   BMI 33.04 kg/m²     General appearance: No apparent distress, appears stated age and cooperative. HEENT: Pupils equal, round, and reactive to light. Conjunctivae/corneas clear. Neck: Supple, with full range of motion. No jugular venous distention. Trachea midline. Respiratory:  Normal respiratory effort. Clear to auscultation, bilaterally without Rales/Wheezes/Rhonchi. Cardiovascular: Regular rate and rhythm with normal S1/S2 without murmurs, rubs or gallops. Abdomen: Soft, non-tender, non-distended with normal bowel sounds. Musculoskeletal: No clubbing, cyanosis or edema bilaterally. Full range of motion without deformity. Skin: Skin color, texture, turgor normal.  No rashes or lesions. Neurologic:  Neurovascularly intact without any focal sensory/motor deficits.  Cranial nerves: II-XII intact, grossly non-focal.  Psychiatric: Alert and oriented, thought content appropriate, normal insight  Capillary Refill: Brisk,< 3 seconds   Peripheral Pulses: +2 palpable, equal bilaterally       Labs:   Recent Labs     09/23/21  0613 09/24/21  1109 09/25/21  0614   WBC 15.6* 21.0* 21.1*   HGB 12.7 11.6* 11.3*   HCT 38.5 34.5* 33.9*    443 463*     Recent Labs     09/23/21  2220 09/24/21  1109 09/25/21  0614   * 135* 137   K 4.3 4.1 4.0   CL 97* 100 102   CO2 19* 19* 21   BUN 50* 47* 33*   CREATININE 1.1 1.1 1.0   CALCIUM 9.1 9.1 8.9   PHOS 2.7  --   --      Recent Labs     09/23/21  0613 09/24/21  1109 09/25/21  0614   AST 26 27 25   ALT 18 19 20   BILITOT 0.5 0.5 0.4   ALKPHOS 90 96 92     No results for input(s): INR in the last 72 hours. No results for input(s): Jarad Hubbard in the last 72 hours. Urinalysis:      Lab Results   Component Value Date    NITRU Negative 09/21/2021    WBCUA 1 09/21/2021    BACTERIA 1+ 09/21/2021    RBCUA 2 09/21/2021    BLOODU Negative 09/21/2021    SPECGRAV >1.030 09/21/2021    GLUCOSEU >=1000 09/21/2021       Radiology:  XR CHEST PORTABLE   Final Result   Bibasilar opacities are favored to represent pneumonia. Assessment/Plan:    COVID-19 pneumonia  Decadron 20 mg daily for 5 days, followed by 10mg for 5 days  Remdesivir 5 days  baricitnib  lovenox bid for anticoagulation  Encourage IS and proning     Acute respiratory failure with hypoxia  Titrate oxygen to keep saturations above 90%  Likely secondary to COVID-19 pneumonia  Down to 35L     Hypertension  Continue home medications     Hyperlipidemia  Continue home medications     Uncontrolled diabetes mellitus type 2  Hold home medications  Lantus 60bid, mealtime 30tid, +SSI  Carb controlled diet  Hard to control with high-dose Decadron    DVT Prophylaxis: Lovenox  Diet: ADULT DIET; Regular; 3 carb choices (45 gm/meal); Low Sodium (2 gm)  Code Status: Full Code    PT/OT Eval Status:  Will need    Jefry Weller MD

## 2021-09-26 NOTE — PROGRESS NOTES
Blood sugar was 44 in morning labs. Pallavi Paul MD notified. Glucose check of 46. Pt given cranberry juice. Blood sugar of 74. Will continue to monitor.     Electronically signed by Ludivina Bear RN on 9/26/2021 at 8:49 AM

## 2021-09-26 NOTE — PLAN OF CARE
Problem: Airway Clearance - Ineffective  Goal: Achieve or maintain patent airway  9/26/2021 0312 by Katarina Schafer RN  Outcome: Ongoing  9/25/2021 1843 by Mateo Crane RN  Outcome: Ongoing     Problem: Gas Exchange - Impaired  Goal: Absence of hypoxia  9/26/2021 0312 by Katarina Schafer RN  Outcome: Ongoing  9/25/2021 1843 by Mateo Crane RN  Outcome: Ongoing     Problem: Breathing Pattern - Ineffective  Goal: Ability to achieve and maintain a regular respiratory rate  9/26/2021 0312 by Katarina Schafer RN  Outcome: Ongoing  9/25/2021 1843 by Mateo Crane RN  Outcome: Ongoing     Problem: Isolation Precautions - Risk of Spread of Infection  Goal: Prevent transmission of infection  9/26/2021 0312 by Katarina Schafer RN  Outcome: Ongoing  9/25/2021 1843 by Mateo Crane RN  Outcome: Ongoing

## 2021-09-26 NOTE — PROGRESS NOTES
Hospitalist Progress Note      PCP: Jose Luis Gandhi    Date of Admission: 9/21/2021    Chief Complaint: SOB    Hospital Course: The patient is a 60 y.o. female who presents to [facility] with shortness of breath. Patient states that she has been feeling symptomatic since Friday September 17. She works at an [employer] and comes in contact with numerous people she states many people at work have been diagnosed with COVID-19 recently. In the emergency department patient was found to be afebrile, tachypneic, tachycardic and saturating 75% on room air. Labs revealed an elevated glucose at 371. Chest x-ray shows bibasilar opacities. Patient tested positive for COVID-19 in the emergency department. Started on Decadron. Pharmacy consulted to dose remdesivir along with baricitinib. While in the ER she desatted on 6 L and required Vapotherm up to 50 L at one point. 9/22 Patient's oxygen demand down to 35 L at 90% FiO2. Sugar still uncontrolled. Patient states she takes 45 units Lantus at night along with 15 units with meals. Adjustments to insulin regimen done. 9/23 Patient is oxygenating just fine on current requirements. Sugar still hard to control so we will increase both Lantus along with mealtime insulin. 9/24 Overnight patient was given 20 units of IV insulin with a drop in glucose from 516 down to 95. Patient's sugars back up to 235 despite 60 units of Lantus. We will continue with high-dose Humalog with meals along with sliding scale    9/25 Down to 35L. Feeling pretty good. Sugars better controlled. Subjective: Patient seen and examined. Oxygen down to 20 L. Adjustments made to insulin regimen as well.       Medications:  Reviewed    Infusion Medications    sodium chloride      dextrose       Scheduled Medications    insulin glargine  45 Units SubCUTAneous BID    insulin lispro  35 Units SubCUTAneous TID WC    dexamethasone  10 mg IntraVENous Daily    insulin lispro  0-18 Units SubCUTAneous TID     insulin lispro  0-9 Units SubCUTAneous Nightly    baricitinib  2 mg Oral Daily    aspirin  81 mg Oral Daily    carvedilol  3.125 mg Oral Daily    fluticasone  2 spray Nasal Daily    losartan  100 mg Oral Daily    traZODone  50 mg Oral Nightly    sodium chloride flush  5-40 mL IntraVENous 2 times per day    enoxaparin  30 mg SubCUTAneous BID    atorvastatin  10 mg Oral Daily    pregabalin  200 mg Oral BID     PRN Meds: sodium chloride flush, sodium chloride, ondansetron **OR** ondansetron, acetaminophen **OR** acetaminophen, glucose, dextrose, glucagon (rDNA), dextrose, guaiFENesin-dextromethorphan, sodium chloride      Intake/Output Summary (Last 24 hours) at 9/26/2021 0851  Last data filed at 9/25/2021 2330  Gross per 24 hour   Intake 10 ml   Output 275 ml   Net -265 ml       Physical Exam Performed:    BP (!) 96/53   Pulse 74   Temp 98.3 °F (36.8 °C) (Oral)   Resp 18   Ht 5' 3\" (1.6 m)   Wt 186 lb 4.6 oz (84.5 kg)   SpO2 98%   BMI 33.00 kg/m²     General appearance: No apparent distress, appears stated age and cooperative. HEENT: Pupils equal, round, and reactive to light. Conjunctivae/corneas clear. Neck: Supple, with full range of motion. No jugular venous distention. Trachea midline. Respiratory:  Normal respiratory effort. Clear to auscultation, bilaterally without Rales/Wheezes/Rhonchi. Cardiovascular: Regular rate and rhythm with normal S1/S2 without murmurs, rubs or gallops. Abdomen: Soft, non-tender, non-distended with normal bowel sounds. Musculoskeletal: No clubbing, cyanosis or edema bilaterally. Full range of motion without deformity. Skin: Skin color, texture, turgor normal.  No rashes or lesions. Neurologic:  Neurovascularly intact without any focal sensory/motor deficits.  Cranial nerves: II-XII intact, grossly non-focal.  Psychiatric: Alert and oriented, thought content appropriate, normal insight  Capillary Refill: Brisk,< 3 seconds   Peripheral Pulses: +2 palpable, equal bilaterally       Labs:   Recent Labs     09/24/21  1109 09/25/21  0614 09/26/21  0626   WBC 21.0* 21.1* 21.7*   HGB 11.6* 11.3* 10.9*   HCT 34.5* 33.9* 33.2*    463* 497*     Recent Labs     09/23/21  2220 09/23/21  2220 09/24/21  1109 09/25/21  0614 09/26/21  0626   *   < > 135* 137 144   K 4.3   < > 4.1 4.0 3.5   CL 97*   < > 100 102 108   CO2 19*   < > 19* 21 22   BUN 50*   < > 47* 33* 24*   CREATININE 1.1   < > 1.1 1.0 0.9   CALCIUM 9.1   < > 9.1 8.9 8.8   PHOS 2.7  --   --   --   --     < > = values in this interval not displayed. Recent Labs     09/24/21  1109 09/25/21  0614 09/26/21  0626   AST 27 25 27   ALT 19 20 18   BILITOT 0.5 0.4 0.3   ALKPHOS 96 92 115     No results for input(s): INR in the last 72 hours. No results for input(s): Wayna Khat in the last 72 hours. Urinalysis:      Lab Results   Component Value Date    NITRU Negative 09/21/2021    WBCUA 1 09/21/2021    BACTERIA 1+ 09/21/2021    RBCUA 2 09/21/2021    BLOODU Negative 09/21/2021    SPECGRAV >1.030 09/21/2021    GLUCOSEU >=1000 09/21/2021       Radiology:  XR CHEST PORTABLE   Final Result   Bibasilar opacities are favored to represent pneumonia. Assessment/Plan:    COVID-19 pneumonia  Decadron 20 mg daily for 5 days, followed by 10mg for 5 days  Remdesivir 5 days  baricitnib  lovenox bid for anticoagulation  Encourage IS and proning     Acute respiratory failure with hypoxia  Titrate oxygen to keep saturations above 90%  Likely secondary to COVID-19 pneumonia  Down to 20L     Hypertension  Continue home medications     Hyperlipidemia  Continue home medications     Uncontrolled diabetes mellitus type 2  Hold home medications  Lantus 60bid, mealtime 30tid, +SSI  Carb controlled diet  Hard to control with high-dose Decadron    DVT Prophylaxis: Lovenox  Diet: ADULT DIET; Regular; 3 carb choices (45 gm/meal);  Low Sodium (2 gm)  Code Status: Full

## 2021-09-26 NOTE — PROGRESS NOTES
SpO2 in the high 70s-lower 80s. Pt went from 20L to 45L to get SpO2 above 90%. Geri Bae MD notified. Will continue to monitor.     Electronically signed by Sandra Perdue RN on 9/26/2021 at 4:07 PM

## 2021-09-26 NOTE — PROGRESS NOTES
Respiratory Therpay    Pt had been weaned to 20L but SPO2 fell to low 80's late afternoon.  Airvo settings increased to 45L FIO2 95% to maintain SPO2 of 90%    Electronically signed by Lynette Pineda RCP on 9/26/2021 at 3:59 PM

## 2021-09-27 NOTE — PLAN OF CARE
Problem:  Body Temperature -  Risk of, Imbalanced  Goal: Ability to maintain a body temperature within defined limits  Outcome: Ongoing  Goal: Will regain or maintain usual level of consciousness  Outcome: Ongoing  Goal: Complications related to the disease process, condition or treatment will be avoided or minimized  Outcome: Ongoing     Problem: Isolation Precautions - Risk of Spread of Infection  Goal: Prevent transmission of infection  Outcome: Ongoing     Problem: Loneliness or Risk for Loneliness  Goal: Demonstrate positive use of time alone when socialization is not possible  Outcome: Ongoing     Problem: Fatigue  Goal: Verbalize increase energy and improved vitality  Outcome: Ongoing

## 2021-09-27 NOTE — CARE COORDINATION
Discharge Planning: per chart review, patient currently requiring 40LO2. Will need PT/OT ordered when appropriate. Follow for DME needs. Plan: Home with spouse.    Jame Cogan, MSW, MARYW, Social Work/Case Management   [phone]  Electronically signed by Jame Cogan, MSW, MARYW on 9/27/2021 at 1:06 PM

## 2021-09-27 NOTE — PROGRESS NOTES
Hospitalist Progress Note      PCP: Татьяна Cramer    Date of Admission: 9/21/2021    Chief Complaint: SOB    Hospital Course: The patient is a 60 y.o. female who presents to [facility] with shortness of breath. Patient states that she has been feeling symptomatic since Friday September 17. She works at an [employer] and comes in contact with numerous people she states many people at work have been diagnosed with COVID-19 recently. In the emergency department patient was found to be afebrile, tachypneic, tachycardic and saturating 75% on room air. Labs revealed an elevated glucose at 371. Chest x-ray shows bibasilar opacities. Patient tested positive for COVID-19 in the emergency department. Started on Decadron. Pharmacy consulted to dose remdesivir along with baricitinib. While in the ER she desatted on 6 L and required Vapotherm up to 50 L at one point. 9/22 Patient's oxygen demand down to 35 L at 90% FiO2. Sugar still uncontrolled. Patient states she takes 45 units Lantus at night along with 15 units with meals. Adjustments to insulin regimen done. 9/23 Patient is oxygenating just fine on current requirements. Sugar still hard to control so we will increase both Lantus along with mealtime insulin. 9/24 Overnight patient was given 20 units of IV insulin with a drop in glucose from 516 down to 95. Patient's sugars back up to 235 despite 60 units of Lantus. We will continue with high-dose Humalog with meals along with sliding scale    9/25 Down to 35L. Feeling pretty good. Sugars better controlled. 9/26 Oxygen down to 20 L. Adjustments made to insulin regimen as well. Subjective: Patient seen and examined. Further reductions made in patient's insulin regimen. Patient's oxygen demand bumped up to 40 L again today.   Will check for possible coinfection with procalcitonin along with beta D glucan      Medications:  Reviewed    Infusion Medications    sodium chloride      dextrose       Scheduled Medications    insulin glargine  30 Units SubCUTAneous BID    insulin lispro  15 Units SubCUTAneous TID     baricitinib  4 mg Oral Daily    dexamethasone  10 mg IntraVENous Daily    insulin lispro  0-18 Units SubCUTAneous TID     insulin lispro  0-9 Units SubCUTAneous Nightly    aspirin  81 mg Oral Daily    carvedilol  3.125 mg Oral Daily    fluticasone  2 spray Nasal Daily    losartan  100 mg Oral Daily    traZODone  50 mg Oral Nightly    sodium chloride flush  5-40 mL IntraVENous 2 times per day    enoxaparin  30 mg SubCUTAneous BID    atorvastatin  10 mg Oral Daily    pregabalin  200 mg Oral BID     PRN Meds: sodium chloride flush, sodium chloride, ondansetron **OR** ondansetron, acetaminophen **OR** acetaminophen, glucose, dextrose, glucagon (rDNA), dextrose, guaiFENesin-dextromethorphan      Intake/Output Summary (Last 24 hours) at 9/27/2021 0905  Last data filed at 9/27/2021 1063  Gross per 24 hour   Intake 360 ml   Output 625 ml   Net -265 ml       Physical Exam Performed:    BP (!) 145/82   Pulse 96   Temp 98.7 °F (37.1 °C) (Oral)   Resp 20   Ht 5' 3\" (1.6 m)   Wt 187 lb 13.3 oz (85.2 kg)   SpO2 96%   BMI 33.27 kg/m²     General appearance: No apparent distress, appears stated age and cooperative. HEENT: Pupils equal, round, and reactive to light. Conjunctivae/corneas clear. Neck: Supple, with full range of motion. No jugular venous distention. Trachea midline. Respiratory:  Normal respiratory effort. Clear to auscultation, bilaterally without Rales/Wheezes/Rhonchi. Cardiovascular: Regular rate and rhythm with normal S1/S2 without murmurs, rubs or gallops. Abdomen: Soft, non-tender, non-distended with normal bowel sounds. Musculoskeletal: No clubbing, cyanosis or edema bilaterally. Full range of motion without deformity. Skin: Skin color, texture, turgor normal.  No rashes or lesions.   Neurologic:  Neurovascularly intact without any focal sensory/motor deficits. Cranial nerves: II-XII intact, grossly non-focal.  Psychiatric: Alert and oriented, thought content appropriate, normal insight  Capillary Refill: Brisk,< 3 seconds   Peripheral Pulses: +2 palpable, equal bilaterally       Labs:   Recent Labs     09/25/21 0614 09/26/21 0626 09/27/21  0717   WBC 21.1* 21.7* 25.1*   HGB 11.3* 10.9* 10.8*   HCT 33.9* 33.2* 32.5*   * 497* 482*     Recent Labs     09/25/21  0614 09/26/21  0626 09/27/21  0717    144 141   K 4.0 3.5 3.2*    108 104   CO2 21 22 22   BUN 33* 24* 20   CREATININE 1.0 0.9 0.9   CALCIUM 8.9 8.8 8.8     Recent Labs     09/25/21 0614 09/26/21  0626 09/27/21  0717   AST 25 27 31   ALT 20 18 22   BILITOT 0.4 0.3 0.4   ALKPHOS 92 115 134*     No results for input(s): INR in the last 72 hours. No results for input(s): Jc Shorts in the last 72 hours. Urinalysis:      Lab Results   Component Value Date    NITRU Negative 09/21/2021    WBCUA 1 09/21/2021    BACTERIA 1+ 09/21/2021    RBCUA 2 09/21/2021    BLOODU Negative 09/21/2021    SPECGRAV >1.030 09/21/2021    GLUCOSEU >=1000 09/21/2021       Radiology:  XR CHEST PORTABLE   Final Result   Bibasilar opacities are favored to represent pneumonia. Assessment/Plan:    COVID-19 pneumonia  Decadron 20 mg daily for 5 days, followed by 10mg for 5 days  Remdesivir 5 days  baricitnib  lovenox bid for anticoagulation  Encourage IS and proning     Acute respiratory failure with hypoxia  Titrate oxygen to keep saturations above 90%  Likely secondary to COVID-19 pneumonia  Oxygen demand back up to 40 L     Hypertension  Continue home medications     Hyperlipidemia  Continue home medications     Uncontrolled diabetes mellitus type 2  Hold home medications  Decrease Lantus along with mealtime insulin  Carb controlled diet  Hard to control with high-dose Decadron    DVT Prophylaxis: Lovenox  Diet: ADULT DIET; Regular; 3 carb choices (45 gm/meal);  Low Sodium (2 gm)  Code Status: Full Code    PT/OT Eval Status:  Will need    Linda Christian MD

## 2021-09-27 NOTE — PROGRESS NOTES
Pts blood sugar was 47 this morning. After a juice the recheck sugar is 123. Pt has humalog and lantus ordered for this morning. Message sent to MD to notify and clarify orders. Will continue to monitor.     Electronically signed by Maryan Vásquez RN on 9/27/2021 at 8:53 AM

## 2021-09-27 NOTE — PLAN OF CARE
Problem: Airway Clearance - Ineffective  Goal: Achieve or maintain patent airway  9/27/2021 1027 by Alex Chamberlain RN  Outcome: Ongoing     Problem: Gas Exchange - Impaired  Goal: Promote optimal lung function  9/27/2021 1027 by Alex Chamberlain RN  Outcome: Ongoing     Problem:  Body Temperature -  Risk of, Imbalanced  Goal: Ability to maintain a body temperature within defined limits  9/27/2021 1027 by Alex Chamberlain RN  Outcome: Ongoing     Problem: Risk for Fluid Volume Deficit  Goal: Maintain normal serum potassium, sodium, calcium, phosphorus, and pH  9/27/2021 1027 by Alex Chamberalin RN  Outcome: Ongoing     Problem: Patient Education: Go to Patient Education Activity  Goal: Patient/Family Education  9/27/2021 1027 by Alex Chamberlain RN  Outcome: Ongoing

## 2021-09-27 NOTE — CARE COORDINATION
[facility]  Hypoglycemia Event and Prevention Plan      NAME: Chico Luna RECORD NUMBER:  [id number]  AGE: 60 y.o. GENDER: female  : 1965  EPISODE DATE:  2021     Data     Recent Labs     21  0757 21  0817 21  1210 21  1639 21  0802   POCGLU 58* 74 163* 227* 236* 47*       Medications  Scheduled Medications:   insulin glargine  45 Units SubCUTAneous BID    insulin lispro  35 Units SubCUTAneous TID WC    baricitinib  4 mg Oral Daily    dexamethasone  10 mg IntraVENous Daily    insulin lispro  0-18 Units SubCUTAneous TID WC    insulin lispro  0-9 Units SubCUTAneous Nightly    aspirin  81 mg Oral Daily    carvedilol  3.125 mg Oral Daily    fluticasone  2 spray Nasal Daily    losartan  100 mg Oral Daily    traZODone  50 mg Oral Nightly    sodium chloride flush  5-40 mL IntraVENous 2 times per day    enoxaparin  30 mg SubCUTAneous BID    atorvastatin  10 mg Oral Daily    pregabalin  200 mg Oral BID       Diet  Current diet/supplement order: ADULT DIET; Regular; 3 carb choices (45 gm/meal); Low Sodium (2 gm)     Recorded PO: PO Meals Eaten (%): 76 - 100% last meal in flowsheets      Action     Recommend decreasing Lantus dose. Physician Notified of event: Yes   Efrain Davis MD     POCT added at 0200.         Responce     Achieved POCT Blood Glucose greater than 70 mg/dl: Yes      Medication plan updated: Yes      Electronically signed by Nelly Ga RN on 2021 at 8:36 AM

## 2021-09-28 NOTE — PROGRESS NOTES
Nutrition Assessment     Type and Reason for Visit: Initial (LOS)    Nutrition Recommendations/Plan:   Continue Carb control (3 CHO/meal), Low Na diet. Nutrition Assessment:  LOS. Pt presented with SOB. Currently in isolation for COVID-19. Pt on airvo @ 50 L. Noted pt with fairly good intakes >50% since admission. No BM since admission, needs bowel regimen. Continue current nutrition interventions. Malnutrition Assessment:  Malnutrition Status: No malnutrition    Nutrition Related Findings: no BM; trace generalized edema; Glu 118      Current Nutrition Therapies:    ADULT DIET; Regular; 3 carb choices (45 gm/meal);  Low Sodium (2 gm)    Anthropometric Measures:  · Height: 5' 3\" (160 cm)  · Current Body Wt: 190 lb (86.2 kg)   · BMI: 33.7    Nutrition Diagnosis:   No nutrition diagnosis at this time    Nutrition Interventions:   Food and/or Nutrient Delivery:  Continue Current Diet  Nutrition Education/Counseling:  No recommendation at this time   Coordination of Nutrition Care:  Continue to monitor while inpatient    Discharge Planning:    No discharge needs at this time     Electronically signed by Elvin Layton RD, LD on 9/28/21 at 9:23 AM EDT    Contact: [phone]

## 2021-09-28 NOTE — PROGRESS NOTES
Hospitalist Progress Note      PCP: J Carloscecilia Medrano    Date of Admission: 2021    Chief Complaint: SOB    Hospital Course: The patient is a 60 y.o. female who presents to [facility] with shortness of breath. Patient states that she has been feeling symptomatic since . She works at an [employer] and comes in contact with numerous people she states many people at work have been diagnosed with COVID-19 recently. In the emergency department patient was found to be afebrile, tachypneic, tachycardic and saturating 75% on room air. Labs revealed an elevated glucose at 371. Chest x-ray shows bibasilar opacities. Patient tested positive for COVID-19 in the emergency department. Started on Decadron. Pharmacy consulted to dose remdesivir along with baricitinib. While in the ER she desatted on 6 L and required Vapotherm up to 50 L at one point.  Patient's oxygen demand down to 35 L at 90% FiO2. Sugar still uncontrolled. Patient states she takes 45 units Lantus at night along with 15 units with meals. Adjustments to insulin regimen done.  Patient is oxygenating just fine on current requirements. Sugar still hard to control so we will increase both Lantus along with mealtime insulin.  Overnight patient was given 20 units of IV insulin with a drop in glucose from 516 down to 95. Patient's sugars back up to 235 despite 60 units of Lantus. We will continue with high-dose Humalog with meals along with sliding scale     Down to 35L. Feeling pretty good. Sugars better controlled.  Oxygen down to 20 L. Adjustments made to insulin regimen as well.  Further reductions made in patient's insulin regimen. Patient's oxygen demand bumped up to 40 L again today. Will check for possible coinfection with procalcitonin along with beta D glucan    Subjective: Patient seen and examined. Patient continues to require 50 L of high flow.   Encourage patient to prone along with use incentive spirometry. She is currently on all available therapeutics. Medications:  Reviewed    Infusion Medications    sodium chloride      dextrose       Scheduled Medications    [START ON 9/29/2021] insulin glargine  30 Units SubCUTAneous Nightly    insulin lispro  25 Units SubCUTAneous TID WC    baricitinib  4 mg Oral Daily    dexamethasone  10 mg IntraVENous Daily    insulin lispro  0-18 Units SubCUTAneous TID WC    insulin lispro  0-9 Units SubCUTAneous Nightly    aspirin  81 mg Oral Daily    carvedilol  3.125 mg Oral Daily    fluticasone  2 spray Nasal Daily    losartan  100 mg Oral Daily    traZODone  50 mg Oral Nightly    sodium chloride flush  5-40 mL IntraVENous 2 times per day    enoxaparin  30 mg SubCUTAneous BID    atorvastatin  10 mg Oral Daily    pregabalin  200 mg Oral BID     PRN Meds: potassium chloride **OR** potassium alternative oral replacement **OR** potassium chloride, magnesium sulfate, sodium chloride flush, sodium chloride, ondansetron **OR** ondansetron, acetaminophen **OR** acetaminophen, glucose, dextrose, glucagon (rDNA), dextrose, guaiFENesin-dextromethorphan      Intake/Output Summary (Last 24 hours) at 9/28/2021 0910  Last data filed at 9/28/2021 0538  Gross per 24 hour   Intake 120 ml   Output 3500 ml   Net -3380 ml       Physical Exam Performed:    /85   Pulse 99   Temp 98.8 °F (37.1 °C) (Oral)   Resp 22   Ht 5' 3\" (1.6 m)   Wt 190 lb 7.6 oz (86.4 kg)   SpO2 98%   BMI 33.74 kg/m²     General appearance: No apparent distress, appears stated age and cooperative. HEENT: Pupils equal, round, and reactive to light. Conjunctivae/corneas clear. Neck: Supple, with full range of motion. No jugular venous distention. Trachea midline. Respiratory:  Normal respiratory effort. Clear to auscultation, bilaterally without Rales/Wheezes/Rhonchi.   Cardiovascular: Regular rate and rhythm with normal S1/S2 without murmurs, rubs or gallops. Abdomen: Soft, non-tender, non-distended with normal bowel sounds. Musculoskeletal: No clubbing, cyanosis or edema bilaterally. Full range of motion without deformity. Skin: Skin color, texture, turgor normal.  No rashes or lesions. Neurologic:  Neurovascularly intact without any focal sensory/motor deficits. Cranial nerves: II-XII intact, grossly non-focal.  Psychiatric: Alert and oriented, thought content appropriate, normal insight  Capillary Refill: Brisk,< 3 seconds   Peripheral Pulses: +2 palpable, equal bilaterally       Labs:   Recent Labs     09/26/21  0626 09/27/21  0717 09/28/21  0739   WBC 21.7* 25.1* 19.6*   HGB 10.9* 10.8* 11.3*   HCT 33.2* 32.5* 35.3*   * 482* 445     Recent Labs     09/26/21  0626 09/27/21  0717 09/28/21  0739    141 139   K 3.5 3.2* 4.0    104 104   CO2 22 22 20*   BUN 24* 20 19   CREATININE 0.9 0.9 0.9   CALCIUM 8.8 8.8 9.1     Recent Labs     09/26/21  0626 09/27/21  0717 09/28/21  0739   AST 27 31 28   ALT 18 22 22   BILITOT 0.3 0.4 0.6   ALKPHOS 115 134* 133*     No results for input(s): INR in the last 72 hours. No results for input(s): Rosaura Horn in the last 72 hours. Urinalysis:      Lab Results   Component Value Date    NITRU Negative 09/21/2021    WBCUA 1 09/21/2021    BACTERIA 1+ 09/21/2021    RBCUA 2 09/21/2021    BLOODU Negative 09/21/2021    SPECGRAV >1.030 09/21/2021    GLUCOSEU >=1000 09/21/2021       Radiology:  XR CHEST PORTABLE   Final Result   Bibasilar opacities are favored to represent pneumonia.                  Assessment/Plan:    COVID-19 pneumonia  Decadron 20 mg daily for 5 days, followed by 10mg for 5 days  Remdesivir 5 days  baricitnib  lovenox bid for anticoagulation  Encourage IS and proning     Acute respiratory failure with hypoxia  Titrate oxygen to keep saturations above 90%  Likely secondary to COVID-19 pneumonia  Oxygen demand back up to 50 L     Hypertension  Continue home medications     Hyperlipidemia  Continue home medications     Uncontrolled diabetes mellitus type 2  Hold home medications  Further adjustments made today due to episode of hypoglycemia  Carb controlled diet  Hard to control with high-dose Decadron    DVT Prophylaxis: Lovenox  Diet: ADULT DIET; Regular; 3 carb choices (45 gm/meal); Low Sodium (2 gm)  Code Status: Full Code    PT/OT Eval Status:  Will need    Billy Cao MD

## 2021-09-29 NOTE — PLAN OF CARE
Problem: Airway Clearance - Ineffective  Goal: Achieve or maintain patent airway  9/29/2021 1032 by Mando Shine RN  Outcome: Ongoing     Problem: Gas Exchange - Impaired  Goal: Promote optimal lung function  9/29/2021 1032 by Mando Shine RN  Outcome: Ongoing     Problem:  Body Temperature -  Risk of, Imbalanced  Goal: Complications related to the disease process, condition or treatment will be avoided or minimized  9/29/2021 1032 by Mando Shine RN  Outcome: Ongoing     Problem: Risk for Fluid Volume Deficit  Goal: Maintain normal serum potassium, sodium, calcium, phosphorus, and pH  9/29/2021 1032 by Mando Shine RN  Outcome: Ongoing     Problem: Patient Education: Go to Patient Education Activity  Goal: Patient/Family Education  9/29/2021 1032 by Mando Shine RN  Outcome: Ongoing

## 2021-09-29 NOTE — CONSULTS
Infectious Diseases Inpatient Consult Note      Reason for Consult:  COVID 19 PNA and resp failure    Requesting Physician:       Primary Care Physician:  Oswaldo Marker    History Obtained From:  Epic and patient     CHIEF COMPLAINT:     Chief Complaint   Patient presents with    Positive For Covid-19         HISTORY OF PRESENT ILLNESS:  60 y.o. who has a past medical history of bipolar disorder, chronic back pain, depression, diabetes, hypertension admitted to the hospital secondary to shortness of breath and cough. She works at [employer] she thinks metal exposed at the last visit and tested positive for COVID-19. Was found to be hypoxic at 75% on room air on admission. Also blood glucose elevated at 1 300s. Chest x-ray consistent with COVID-19 pneumonia with bilateral opacities. On admission WBC count normal with ongoing lymphocytopenia secondary to COVID-19 infection. While on dexamethasone her WBC count has increased. She is required heated high flow secondary on ongoing hypoxemia. Serum Beta D glucan +Ve on serological testing and we are consulted for recommendations. Past Medical History:    Past Medical History:   Diagnosis Date    Asthma     Bipolar 1 disorder ([facility] [address]     Chronic back pain     COVID-19 09/17/2021    DDD (degenerative disc disease)     Depression     Diabetes mellitus ([facility] [address]     Fibromyalgia     Hyperlipidemia     Hypertension        Past Surgical History:    Past Surgical History:   Procedure Laterality Date    CARPAL TUNNEL RELEASE      HAND SURGERY      HYSTERECTOMY         Current Medications:    No outpatient medications have been marked as taking for the 9/21/21 encounter [facility] Encounter).        Allergies:  Latex, Doxycycline, Flagyl [metronidazole], Ibuprofen, Iv dye [iodides], and Strawberry extract    Immunizations :   Immunization History   Administered Date(s) Administered    Pneumococcal Polysaccharide ([id number]) 06/02/2014 Social History:    Social History     Tobacco Use    Smoking status: Never Smoker    Smokeless tobacco: Never Used   Vaping Use    Vaping Use: Never used   Substance Use Topics    Alcohol use: Not Currently     Comment: 1-2 times a month    Drug use: No     Social History     Tobacco Use   Smoking Status Never Smoker   Smokeless Tobacco Never Used      Family History   Problem Relation Age of Onset    Heart Disease Mother     Heart Disease Father         REVIEW OF SYSTEMS:    Constitutional:  negative for fevers, chills, night sweats  Eyes:  negative for blurred vision, eye discharge, visual disturbance   HEENT:  negative for hearing loss, ear drainage,nasal congestion  Respiratory:  cough+ , shortness of breath + or hemoptysis   Cardiovascular:  negative for chest pain, palpitations, syncope  Gastrointestinal:  negative for nausea, vomiting, diarrhea, constipation, abdominal pain  Genitourinary:  negative for frequency, dysuria, urinary incontinence, hematuria  Hematologic/Lymphatic:  negative for easy bruising, bleeding and lymphadenopathy  Allergic/Immunologic:  negative for recurrent infections, angioedema, anaphylaxis   Endocrine:  negative for weight changes, polyuria, polydipsia and polyphagia  Musculoskeletal:  negative for joint  pain, swelling, decreased range of motion  Integumentary: No rashes, skin lesions  Neurological:  negative for headaches, slurred speech, unilateral weakness  Psychiatric: negative for hallucinations,confusion,agitation.      PHYSICAL EXAM:      Vitals:    /78   Pulse 106   Temp 98 °F (36.7 °C) (Axillary)   Resp 24   Ht 5' 3\" (1.6 m)   Wt 190 lb 0.6 oz (86.2 kg)   SpO2 93%   BMI 33.66 kg/m²     General Appearance: alert,in  acute distress, ++ pallor, no icterus on heated high flow oxygen   Skin: warm and dry, no rash or erythema  Head: normocephalic and atraumatic  Eyes: pupils equal, round, and reactive to light, conjunctivae normal  ENT: tympanic membrane, external ear and ear canal normal bilaterally, nose without deformity, nasal mucosa and turbinates normal without polyps  Neck: supple and non-tender without mass, no thyromegaly  no cervical lymphadenopathy  Pulmonary/Chest: Bi basal crepts++  no wheezes, rales or rhonchi, normal air movement, in  respiratory distress  Cardiovascular: normal rate, regular rhythm, normal S1 and S2, no murmurs, rubs, clicks, or gallops, no carotid bruits  Abdomen: soft, non-tender, non-distended, normal bowel sounds, no masses or organomegaly  Extremities: no cyanosis, clubbing or edema  Musculoskeletal: normal range of motion, no joint swelling, deformity or tenderness  Integumentary: No rashes, no abnormal skin lesions, no petechiae  Neurologic: reflexes normal and symmetric, no cranial nerve deficit  Psych:  Orientation, sensorium, mood normal   Lines: IV    DATA:    CBC:   Lab Results   Component Value Date    WBC 16.9 (H) 09/29/2021    HGB 11.7 (L) 09/29/2021    HCT 36.9 09/29/2021    MCV 93.4 09/29/2021     09/29/2021     RENAL:   Lab Results   Component Value Date    CREATININE 0.9 09/29/2021    BUN 23 (H) 09/29/2021     09/29/2021    K 4.3 09/29/2021     09/29/2021    CO2 20 (L) 09/29/2021     SED RATE: No results found for: SEDRATE  CK: No results found for: CKTOTAL  CRP: No results found for: CRP  Hepatic Function Panel:   Lab Results   Component Value Date    ALKPHOS 154 09/29/2021    ALT 41 09/29/2021    AST 44 09/29/2021    PROT 7.0 09/29/2021    BILITOT 0.5 09/29/2021    BILIDIR <0.2 02/05/2016    IBILI see below 02/05/2016    LABALBU 3.1 09/29/2021     UA:  Lab Results   Component Value Date    COLORU YELLOW 09/21/2021    CLARITYU Clear 09/21/2021    GLUCOSEU >=1000 09/21/2021    BILIRUBINUR Negative 09/21/2021    KETUA 15 09/21/2021    SPECGRAV >1.030 09/21/2021    BLOODU Negative 09/21/2021    PHUR 5.5 09/21/2021    PROTEINU 100 09/21/2021    UROBILINOGEN 0.2 09/21/2021    NITRU Negative 09/21/2021    LEUKOCYTESUR Negative 09/21/2021    LABMICR YES 09/21/2021    URINETYPE NotGiven 09/21/2021      Urine Microscopic:   Lab Results   Component Value Date    BACTERIA 1+ 09/21/2021    COMU see below 09/21/2021    HYALCAST 3 09/21/2021    WBCUA 1 09/21/2021    RBCUA 2 09/21/2021    EPIU 3 09/21/2021     Urine Reflex to Culture:   Lab Results   Component Value Date    URRFLXCULT Not Indicated 09/21/2021       Wbc  25.1     9/29/2021  2:19 PM - Yolanda Lowry Incoming Lab Results From Soft (Epic Adt)    Component Value Ref Range & Units Status Collected Lab   (1,3)-Beta-D-Glucan (Fungitell) Interpretation PositiveAbnormal   Negative Final 09/27/2021  1:07 PM [facility]   INTERPRETIVE INFORMATION: (1,3)-beta-D-glucan (Fungitell)     Less than 31 pg/mL . .................. Negative     31-59 pg/mL . ......................... Negative     60-79 pg/mL . ......................... Indeterminate     Greater than or equal to 80 pg/mL . ... Positive   The Fungitell test is indicated for presumptive diagnosis   of fungal infection and should be used in conjunction with   other diagnostic procedures. This test does not detect   certain fungal species such as Cryptococcus, which produce   very low levels of (1,3)-beta-D-glucan. This test will not   detect the zygomycetes, such as Absidia, Mucor, and   Rhizopus, which are not known to produce   (1,3)-beta-D-glucan. In addition, the yeast phase of       Ref Range & Units 09/24/21 1109   Hemoglobin A1C See comment % 8.5    Comment: Comment:   Diagnosis of Diabetes: > or = 6.5%   Increased risk of diabetes (Prediabetes): 5.7-6.4%   Glycemic Control: Nonpregnant Adults: <7.0%                     Pregnant: <6.0%        MICRO: cultures reviewed and updated by me       Blood Culture: No results found for: BC, BLOODCULT2    Viral Culture:    Lab Results   Component Value Date    COVID19 Detected 09/17/2021     Urine Culture: No results for input(s): Thelma [place] in the last 72 hours.     Scheduled Meds:   anidulafungin (ERAXIS) 100 mg IVPB  100 mg IntraVENous Daily    insulin glargine  30 Units SubCUTAneous Nightly    insulin lispro  25 Units SubCUTAneous TID WC    baricitinib  4 mg Oral Daily    dexamethasone  10 mg IntraVENous Daily    insulin lispro  0-18 Units SubCUTAneous TID WC    insulin lispro  0-9 Units SubCUTAneous Nightly    aspirin  81 mg Oral Daily    carvedilol  3.125 mg Oral Daily    fluticasone  2 spray Nasal Daily    losartan  100 mg Oral Daily    traZODone  50 mg Oral Nightly    sodium chloride flush  5-40 mL IntraVENous 2 times per day    enoxaparin  30 mg SubCUTAneous BID    atorvastatin  10 mg Oral Daily    pregabalin  200 mg Oral BID       Continuous Infusions:   sodium chloride      dextrose         PRN Meds:  potassium chloride **OR** potassium alternative oral replacement **OR** potassium chloride, magnesium sulfate, sodium chloride flush, sodium chloride, ondansetron **OR** ondansetron, acetaminophen **OR** acetaminophen, glucose, dextrose, glucagon (rDNA), dextrose, guaiFENesin-dextromethorphan    Imaging:   XR CHEST PORTABLE   Final Result   Bibasilar opacities are favored to represent pneumonia. All pertinent images and reports for the current Hospitalization were reviewed by me.     IMPRESSION:    Patient Active Problem List   Diagnosis    Syncope and collapse    Community acquired bacterial pneumonia    Abnormal cardiovascular stress test    Essential hypertension    Mixed hyperlipidemia    Pneumonia due to COVID-19 virus     COVID 19 PNA  Acute Hypoxic resp failure on heated high flow  DM poor control   Depression  Bi polar  Obesity BMI at 33  CXR with Multifocal PNA  Serum Beta D glucan +Ve      She remains very ill from on going resp failure and multifocal PNA and WBC elevation from steroids and BG HIGH worsening from Dexamethasone use  Given the steroid use and positive Beta D glucan assay concern for any invasive fungal process will do additional testing     Labs, Microbiology, Radiology and pertinent results from current hospitalization and care every where were reviewed by me as a part of the consultation. PLAN :  1. Wean off Dexamethasone   2. May have to discontinue Barcitinib if Aspergillus antigen come back positive  3. CXR today   4. Fungal serologies  5. Urine Histo antigen   6. Sputum cx  7. Will change to oral V fend x 200  Mg q 12 hrs  8. Control DM   9. Risk for intubation high       Discussed with patient/Family and Nursing   Risk of Complications/Morbidity: High      · Illness(es)/ Infection present that pose threat to bodily function. · There is potential for severe exacerbation of infection/side effects of treatment. · Therapy requires intensive monitoring for antimicrobial agent toxicity. Thanks for allowing me to participate in your patient's care please call me with any questions or concerns.     Dr. Daniel Valdivia MD  [address] [place] [address] Physician  Phone: [phone]   Fax : [phone]

## 2021-09-29 NOTE — PLAN OF CARE
Problem: Airway Clearance - Ineffective  Goal: Achieve or maintain patent airway  9/29/2021 1000 by Claudy Walker RN  Outcome: Ongoing     Problem: Gas Exchange - Impaired  Goal: Promote optimal lung function  9/29/2021 1000 by Claudy Walker RN  Outcome: Ongoing     Problem:  Body Temperature -  Risk of, Imbalanced  Goal: Ability to maintain a body temperature within defined limits  9/29/2021 1000 by Claudy Walker RN  Outcome: Ongoing     Problem: Risk for Fluid Volume Deficit  Goal: Maintain normal heart rhythm  9/29/2021 1000 by Claudy Walker RN  Outcome: Ongoing     Problem: Patient Education: Go to Patient Education Activity  Goal: Patient/Family Education  9/29/2021 1000 by Claudy Walker RN  Outcome: Ongoing

## 2021-09-29 NOTE — PLAN OF CARE
Problem: Airway Clearance - Ineffective  Goal: Achieve or maintain patent airway  9/28/2021 2219 by Arturo Jaffe RN  Outcome: Ongoing  9/28/2021 1008 by Kaitlin Ruelas RN  Outcome: Ongoing     Problem: Gas Exchange - Impaired  Goal: Absence of hypoxia  9/28/2021 2219 by Arturo Jaffe RN  Outcome: Ongoing  9/28/2021 1008 by Kaitlin Ruelas RN  Outcome: Ongoing  Goal: Promote optimal lung function  9/28/2021 2219 by Arturo Jaffe RN  Outcome: Ongoing  9/28/2021 1008 by Kaitlin Ruelas RN  Outcome: Ongoing     Problem: Breathing Pattern - Ineffective  Goal: Ability to achieve and maintain a regular respiratory rate  9/28/2021 2219 by Arturo Jaffe RN  Outcome: Ongoing  9/28/2021 1008 by Kaitlin Ruelas RN  Outcome: Ongoing     Problem:  Body Temperature -  Risk of, Imbalanced  Goal: Ability to maintain a body temperature within defined limits  9/28/2021 2219 by Arturo Jaffe RN  Outcome: Ongoing  9/28/2021 1008 by Kaitlin Ruelas RN  Outcome: Ongoing  Goal: Will regain or maintain usual level of consciousness  9/28/2021 2219 by Arturo Jaffe RN  Outcome: Ongoing  9/28/2021 1008 by Kaitlin Ruelas RN  Outcome: Ongoing  Goal: Complications related to the disease process, condition or treatment will be avoided or minimized  9/28/2021 2219 by Arturo Jaffe RN  Outcome: Ongoing  9/28/2021 1008 by Kaitlin Ruelas RN  Outcome: Ongoing     Problem: Isolation Precautions - Risk of Spread of Infection  Goal: Prevent transmission of infection  9/28/2021 2219 by Arturo Jaffe RN  Outcome: Ongoing  9/28/2021 1008 by Kaitlin Ruelas RN  Outcome: Ongoing     Problem: Nutrition Deficits  Goal: Optimize nutritional status  9/28/2021 2219 by Arturo Jaffe RN  Outcome: Ongoing  9/28/2021 1008 by Kaitlin Ruelas RN  Outcome: Ongoing     Problem: Risk for Fluid Volume Deficit  Goal: Maintain normal heart rhythm  9/28/2021 2219 by Arturo Jaffe RN  Outcome: Ongoing  9/28/2021 1008 by Kaitlin Ruelas RN  Outcome: Ongoing  Goal: Maintain absence of muscle cramping  9/28/2021 2219 by Mario Hooks RN  Outcome: Ongoing  9/28/2021 1008 by Janet Max RN  Outcome: Ongoing  Goal: Maintain normal serum potassium, sodium, calcium, phosphorus, and pH  9/28/2021 2219 by Mario Hooks RN  Outcome: Ongoing  9/28/2021 1008 by Janet Max RN  Outcome: Ongoing     Problem: Loneliness or Risk for Loneliness  Goal: Demonstrate positive use of time alone when socialization is not possible  9/28/2021 2219 by Mario Hooks RN  Outcome: Ongoing  9/28/2021 1008 by Janet Max RN  Outcome: Ongoing     Problem: Fatigue  Goal: Verbalize increase energy and improved vitality  9/28/2021 2219 by Mario Hooks RN  Outcome: Ongoing  9/28/2021 1008 by Janet Max RN  Outcome: Ongoing     Problem: Patient Education: Go to Patient Education Activity  Goal: Patient/Family Education  9/28/2021 2219 by Mario Hooks RN  Outcome: Ongoing  9/28/2021 1008 by Janet Max RN  Outcome: Ongoing

## 2021-09-29 NOTE — PROGRESS NOTES
Hospitalist Progress Note      PCP: Zehra Silva    Date of Admission: 9/21/2021    Chief Complaint: SOB    Hospital Course: The patient is a 60 y.o. female who presents to [facility] with shortness of breath. Patient states that she has been feeling symptomatic since Friday September 17. She works at an [employer] and comes in contact with numerous people she states many people at work have been diagnosed with COVID-19 recently. In the emergency department patient was found to be afebrile, tachypneic, tachycardic and saturating 75% on room air. Labs revealed an elevated glucose at 371. Chest x-ray shows bibasilar opacities. Patient tested positive for COVID-19 in the emergency department. Started on Decadron. Pharmacy consulted to dose remdesivir along with baricitinib. While in the ER she desatted on 6 L and required Vapotherm up to 50 L at one point. Patient's oxygen demand continue to vacillate and eventually she settled on 15 L. Her sugars have been hard to control throughout admission and numerous adjustments have been needed. Subjective: Patient seen and examined. Patient now requiring 60 L along with nonrebreather. Advised the patient to but she is talking to family members on the phone and tried texting or FaceTime instead.       Medications:  Reviewed    Infusion Medications    sodium chloride      dextrose       Scheduled Medications    insulin glargine  30 Units SubCUTAneous Nightly    insulin lispro  25 Units SubCUTAneous TID WC    baricitinib  4 mg Oral Daily    dexamethasone  10 mg IntraVENous Daily    insulin lispro  0-18 Units SubCUTAneous TID WC    insulin lispro  0-9 Units SubCUTAneous Nightly    aspirin  81 mg Oral Daily    carvedilol  3.125 mg Oral Daily    fluticasone  2 spray Nasal Daily    losartan  100 mg Oral Daily    traZODone  50 mg Oral Nightly    sodium chloride flush  5-40 mL IntraVENous 2 times per day    enoxaparin  30 mg SubCUTAneous BID    atorvastatin  10 mg Oral Daily    pregabalin  200 mg Oral BID     PRN Meds: potassium chloride **OR** potassium alternative oral replacement **OR** potassium chloride, magnesium sulfate, sodium chloride flush, sodium chloride, ondansetron **OR** ondansetron, acetaminophen **OR** acetaminophen, glucose, dextrose, glucagon (rDNA), dextrose, guaiFENesin-dextromethorphan      Intake/Output Summary (Last 24 hours) at 9/29/2021 0706  Last data filed at 9/29/2021 0645  Gross per 24 hour   Intake 331.36 ml   Output 503 ml   Net -171.64 ml       Physical Exam Performed:    BP (!) 144/82   Pulse 95   Temp 97.9 °F (36.6 °C) (Axillary)   Resp 16   Ht 5' 3\" (1.6 m)   Wt 190 lb 0.6 oz (86.2 kg)   SpO2 90%   BMI 33.66 kg/m²     General appearance: No apparent distress, appears stated age and cooperative. HEENT: Pupils equal, round, and reactive to light. Conjunctivae/corneas clear. Neck: Supple, with full range of motion. No jugular venous distention. Trachea midline. Respiratory:  Normal respiratory effort. Clear to auscultation, bilaterally without Rales/Wheezes/Rhonchi. Cardiovascular: Regular rate and rhythm with normal S1/S2 without murmurs, rubs or gallops. Abdomen: Soft, non-tender, non-distended with normal bowel sounds. Musculoskeletal: No clubbing, cyanosis or edema bilaterally. Full range of motion without deformity. Skin: Skin color, texture, turgor normal.  No rashes or lesions. Neurologic:  Neurovascularly intact without any focal sensory/motor deficits.  Cranial nerves: II-XII intact, grossly non-focal.  Psychiatric: Alert and oriented, thought content appropriate, normal insight  Capillary Refill: Brisk,< 3 seconds   Peripheral Pulses: +2 palpable, equal bilaterally       Labs:   Recent Labs     09/27/21 0717 09/28/21  0739   WBC 25.1* 19.6*   HGB 10.8* 11.3*   HCT 32.5* 35.3*   * 445     Recent Labs     09/27/21  0717 09/28/21  0739    139   K 3.2* 4.0    104   CO2 22 20*   BUN 20 19   CREATININE 0.9 0.9   CALCIUM 8.8 9.1     Recent Labs     09/27/21  0717 09/28/21  0739   AST 31 28   ALT 22 22   BILITOT 0.4 0.6   ALKPHOS 134* 133*     No results for input(s): INR in the last 72 hours. No results for input(s): Gail No in the last 72 hours. Urinalysis:      Lab Results   Component Value Date    NITRU Negative 09/21/2021    WBCUA 1 09/21/2021    BACTERIA 1+ 09/21/2021    RBCUA 2 09/21/2021    BLOODU Negative 09/21/2021    SPECGRAV >1.030 09/21/2021    GLUCOSEU >=1000 09/21/2021       Radiology:  XR CHEST PORTABLE   Final Result   Bibasilar opacities are favored to represent pneumonia. Assessment/Plan:    COVID-19 pneumonia  Decadron 20 mg daily for 5 days, followed by 10mg for 5 days  Remdesivir 5 days  baricitnib  lovenox bid for anticoagulation  Encourage IS and proning  Elevated 1 3 beta D glucan, Eraxis started, infectious disease consulted     Acute respiratory failure with hypoxia  Titrate oxygen to keep saturations above 90%  Likely secondary to COVID-19 pneumonia  Oxygen demand back up to 60 L and nonrebreather     Hypertension  Continue home medications     Hyperlipidemia  Continue home medications     Uncontrolled diabetes mellitus type 2  Hold home medications  Further adjustments made today due to episode of hypoglycemia  Carb controlled diet  Hard to control with high-dose Decadron    DVT Prophylaxis: Lovenox  Diet: ADULT DIET; Regular; 3 carb choices (45 gm/meal); Low Sodium (2 gm)  Code Status: Full Code    PT/OT Eval Status:  Will need    Billy Cao MD

## 2021-09-30 NOTE — PLAN OF CARE
Problem: Gas Exchange - Impaired  Goal: Absence of hypoxia  Outcome: Ongoing     Problem: Loneliness or Risk for Loneliness  Goal: Demonstrate positive use of time alone when socialization is not possible  Outcome: Ongoing

## 2021-09-30 NOTE — PROGRESS NOTES
Hospitalist Progress Note      PCP: Bart Dorsey    Date of Admission: 2021    SUBJECTIVE:   Declines proning; [address] kept falling off and would grab NRB; non-productive cough; no new concerns            OBJECTIVE:     Vitals    TEMPERATURE:  Current - Temp: 98 °F (36.7 °C); Max - Temp  Av.1 °F (36.7 °C)  Min: 97.7 °F (36.5 °C)  Max: 98.4 °F (36.9 °C)  RESPIRATIONS RANGE: Resp  Av  Min: 20  Max: 24  PULSE RANGE: Pulse  Av.2  Min: 83  Max: 109  BLOOD PRESSURE RANGE:  Systolic (51ENE), EBU:746 , Min:102 , YNM:784   ; Diastolic (78TZX), NHP:99, Min:65, Max:81    PULSE OXIMETRY RANGE: SpO2  Av.6 %  Min: 91 %  Max: 99 %  24HR INTAKE/OUTPUT:    Intake/Output Summary (Last 24 hours) at 2021 1517  Last data filed at 2021 1104  Gross per 24 hour   Intake 480 ml   Output 1100 ml   Net -620 ml       Exam:    General appearance: Well, no apparent distress, appears stated age and cooperative. HEENT Normal cephalic, atraumatic without obvious deformity. Pupils equal, round, and reactive to light. Extra ocular muscles intact. Conjunctivae/corneas clear. Neck: Supple, No jugular venous distention/bruits. Trachea midline without thyromegaly or adenopathy with full range of motion. Lungs: diminished throughout  Heart: Regular rate and rhythm with Normal S1/S2 without  murmurs, rubs or gallops, point of maximum impulse non-displaced  Abdomen: Soft, non-tender or non-distended without rigidity or guarding and positive bowel sounds all four quadrants. Extremities: No clubbing, cyanosis, or edema bilaterally. Full range of motion without deformity and normal gait intact. Skin: Skin color, texture, turgor normal. No rashes or lesions. Neurologic: Alert and oriented X 3,  neurovascularly intact with sensory/motor intact upper extremities/lower extremities, bilaterally.  Cranial nerves:II-XII intact, grossly non-focal.  Mental status: Alert, oriented, thought content appropriate.         ASSESSMENT AND PLAN    COVID-19 pneumonia  Decadron 20 mg daily for 5 days, followed by 10mg for 5 days  Remdesivir 5 days  baricitnib day 10/14  lovenox bid for anticoagulation  Encourage IS and proning  Elevated 1 3 beta D glucan, Eraxis started, infectious disease consulted     Acute respiratory failure with hypoxia  Titrate oxygen to keep saturations above 90%  Likely secondary to COVID-19 pneumonia  Oxygen demand currently [facility] 60 L and nonrebreather PRN    Serum Beta D glucan +ve - urine histo, aspergillus Ag, fungal serologies, sputum Cx; oral V dend per ID per ID     Hypertension  Continue home medications     Hyperlipidemia  Continue home medications     Uncontrolled diabetes mellitus type 2  Hold home medications  Adjust PRN  Carb controlled diet  Hard to control with high-dose Decadron     DVT Prophylaxis: Lovenox BID            Micah Dumont DO

## 2021-09-30 NOTE — PROGRESS NOTES
Infectious Disease Follow up Notes  Admit Date: 9/21/2021  Hospital Day: 10    Antibiotics : IV Dexamethasone  Baricitinib  Voriconazole     CHIEF COMPLAINT:     COVID 19 PNA  ARDS  DM+   Positive Beta D glucan  Subjective interval History :  60 y.o. who has a past medical history of bipolar disorder, chronic back pain, depression, diabetes, hypertension admitted to the hospital secondary to shortness of breath and cough. She works at [employer] she thinks metal exposed at the last visit and tested positive for COVID-19. Was found to be hypoxic at 75% on room air on admission. Also blood glucose elevated at 1 300s. Chest x-ray consistent with COVID-19 pneumonia with bilateral opacities. On admission WBC count normal with ongoing lymphocytopenia secondary to COVID-19 infection. While on dexamethasone her WBC count has increased. She is required heated high flow secondary on ongoing hypoxemia. Serum Beta D glucan +Ve on serological testing and we are consulted for recommendations.       Interval History : remains sob + cough + using high flow  Nasal cannula no nausea no vomiting  -       Past Medical History:    Past Medical History:   Diagnosis Date    Asthma     Bipolar 1 disorder ([address]     Chronic back pain     COVID-19 09/17/2021    DDD (degenerative disc disease)     Depression     Diabetes mellitus (HCC)     Fibromyalgia     Hyperlipidemia     Hypertension        Past Surgical History:    Past Surgical History:   Procedure Laterality Date    CARPAL TUNNEL RELEASE      HAND SURGERY      HYSTERECTOMY         Current Medications:    No outpatient medications have been marked as taking for the 9/21/21 encounter [facility] Encounter).        Allergies:  Latex, Doxycycline, Flagyl [metronidazole], Ibuprofen, Iv dye [iodides], and Strawberry extract    Immunizations :   Immunization History   Administered Date(s) Administered  Pneumococcal Polysaccharide ([id number]) 06/02/2014       Social History:    Social History     Tobacco Use    Smoking status: Never Smoker    Smokeless tobacco: Never Used   Vaping Use    Vaping Use: Never used   Substance Use Topics    Alcohol use: Not Currently     Comment: 1-2 times a month    Drug use: No     Social History     Tobacco Use   Smoking Status Never Smoker   Smokeless Tobacco Never Used      Family History   Problem Relation Age of Onset    Heart Disease Mother     Heart Disease Father          REVIEW OF SYSTEMS:     Constitutional:  negative for fevers, chills, night sweats  Eyes:  negative for blurred vision, eye discharge, visual disturbance   HEENT:  negative for hearing loss, ear drainage,nasal congestion  Respiratory:   cough+ , shortness of breath + or hemoptysis   Cardiovascular:  negative for chest pain, palpitations, syncope  Gastrointestinal:  negative for nausea, vomiting, diarrhea, constipation, abdominal pain  Genitourinary:  negative for frequency, dysuria, urinary incontinence, hematuria  Hematologic/Lymphatic:  negative for easy bruising, bleeding and lymphadenopathy  Allergic/Immunologic:  negative for recurrent infections, angioedema, anaphylaxis   Endocrine:  negative for weight changes, polyuria, polydipsia and polyphagia  Musculoskeletal:  negative for joint  pain, swelling, decreased range of motion  Integumentary: No rashes, skin lesions  Neurological:  negative for headaches, slurred speech, unilateral weakness  Psychiatric: negative for hallucinations,confusion,agitation.                 PHYSICAL EXAM:      Vitals:    /72   Pulse 83   Temp 98.4 °F (36.9 °C) (Axillary)   Resp 24   Ht 5' 3\" (1.6 m)   Wt 188 lb 4.4 oz (85.4 kg)   SpO2 91%   BMI 33.35 kg/m²       General Appearance: alert,in  acute distress, ++ pallor, no icterus on heated high flow oxygen   Skin: warm and dry, no rash or erythema  Head: normocephalic and atraumatic  Eyes: pupils equal, round, and reactive to light, conjunctivae normal  ENT: tympanic membrane, external ear and ear canal normal bilaterally, nose without deformity, nasal mucosa and turbinates normal without polyps  Neck: supple and non-tender without mass, no thyromegaly  no cervical lymphadenopathy  Pulmonary/Chest: Bi basal crepts++  no wheezes, rales or rhonchi, normal air movement, in  respiratory distress  Cardiovascular: normal rate, regular rhythm, normal S1 and S2, no murmurs, rubs, clicks, or gallops, no carotid bruits  Abdomen: soft, non-tender, non-distended, normal bowel sounds, no masses or organomegaly  Extremities: no cyanosis, clubbing or edema  Musculoskeletal: normal range of motion, no joint swelling, deformity or tenderness  Integumentary: No rashes, no abnormal skin lesions, no petechiae  Neurologic: reflexes normal and symmetric, no cranial nerve deficit  Psych:  Orientation, sensorium, mood normal            Lines: IV     Data Review:    CBC:   Lab Results   Component Value Date    WBC 17.5 (H) 09/30/2021    HGB 11.3 (L) 09/30/2021    HCT 35.5 (L) 09/30/2021    MCV 92.8 09/30/2021     09/30/2021     RENAL:   Lab Results   Component Value Date    CREATININE 0.9 09/30/2021    BUN 27 (H) 09/30/2021     09/30/2021    K 4.4 09/30/2021     09/30/2021    CO2 22 09/30/2021     SED RATE: No results found for: SEDRATE  CK: No results found for: CKTOTAL  CRP: No results found for: CRP  Hepatic Function Panel:   Lab Results   Component Value Date    ALKPHOS 146 09/30/2021    ALT 60 09/30/2021    AST 45 09/30/2021    PROT 6.7 09/30/2021    BILITOT 0.5 09/30/2021    BILIDIR <0.2 02/05/2016    IBILI see below 02/05/2016    LABALBU 3.3 09/30/2021     UA:  Lab Results   Component Value Date    COLORU YELLOW 09/21/2021    CLARITYU Clear 09/21/2021    GLUCOSEU >=1000 09/21/2021    BILIRUBINUR Negative 09/21/2021    KETUA 15 09/21/2021    SPECGRAV >1.030 09/21/2021    BLOODU Negative 09/21/2021    PHUR 5.5 09/21/2021    PROTEINU 100 09/21/2021    UROBILINOGEN 0.2 09/21/2021    NITRU Negative 09/21/2021    LEUKOCYTESUR Negative 09/21/2021    LABMICR YES 09/21/2021    URINETYPE NotGiven 09/21/2021      Urine Microscopic:   Lab Results   Component Value Date    BACTERIA 1+ 09/21/2021    COMU see below 09/21/2021    HYALCAST 3 09/21/2021    WBCUA 1 09/21/2021    RBCUA 2 09/21/2021    EPIU 3 09/21/2021     Urine Reflex to Culture:   Lab Results   Component Value Date    URRFLXCULT Not Indicated 09/21/2021 9/29/2021  2:19 PM - [facility] Incoming Lab Results From Soft (Epic Adt)     Component Value Ref Range & Units Status Collected Lab   (1,3)-Beta-D-Glucan (Fungitell) Interpretation PositiveAbnormal   Negative Final 09/27/2021  1:07 PM [facility]   INTERPRETIVE INFORMATION: (1,3)-beta-D-glucan (Fungitell)     Less than 31 pg/mL . .................. Negative     31-59 pg/mL . ......................... Negative     60-79 pg/mL . ......................... Indeterminate     Greater than or equal to 80 pg/mL . ... Positive   The Fungitell test is indicated for presumptive diagnosis   of fungal infection and should be used in conjunction with   other diagnostic procedures. This test does not detect   certain fungal species such as Cryptococcus, which produce   very low levels of (1,3)-beta-D-glucan. This test will not   detect the zygomycetes, such as Absidia, Mucor, and   Rhizopus, which are not known to produce   (1,3)-beta-D-glucan. In addition, the yeast phase of        MICRO: cultures reviewed and updated by me   Blood Culture: No results found for: BC, BLOODCULT2    Respiratory Culture:  No results found for: [place]  AFB:No results found for: [facility]  Viral Culture:  Lab Results   Component Value Date    COVID19 Detected 09/17/2021     Urine Culture: No results for input(s): Agustin Mcclure in the last 72 hours.       IMAGING:    XR CHEST PORTABLE   Final Result   Interval improvement in bibasilar opacities consistent with improving   pneumonia. XR CHEST PORTABLE   Final Result   Bibasilar opacities are favored to represent pneumonia.                All the pertinent images and reports for the current Hospitalization were reviewed by me     Scheduled Meds:   insulin glargine  25 Units SubCUTAneous Nightly    voriconazole  200 mg Oral 2 times per day    fluticasone  2 puff Inhalation BID    insulin lispro  25 Units SubCUTAneous TID WC    baricitinib  4 mg Oral Daily    dexamethasone  10 mg IntraVENous Daily    insulin lispro  0-18 Units SubCUTAneous TID WC    insulin lispro  0-9 Units SubCUTAneous Nightly    aspirin  81 mg Oral Daily    carvedilol  3.125 mg Oral Daily    fluticasone  2 spray Nasal Daily    losartan  100 mg Oral Daily    traZODone  50 mg Oral Nightly    sodium chloride flush  5-40 mL IntraVENous 2 times per day    enoxaparin  30 mg SubCUTAneous BID    atorvastatin  10 mg Oral Daily    pregabalin  200 mg Oral BID       Continuous Infusions:   sodium chloride      dextrose         PRN Meds:  potassium chloride **OR** potassium alternative oral replacement **OR** potassium chloride, magnesium sulfate, sodium chloride flush, sodium chloride, ondansetron **OR** ondansetron, acetaminophen **OR** acetaminophen, glucose, dextrose, glucagon (rDNA), dextrose, guaiFENesin-dextromethorphan      Assessment:     Patient Active Problem List   Diagnosis    Syncope and collapse    Community acquired bacterial pneumonia    Abnormal cardiovascular stress test    Essential hypertension    Mixed hyperlipidemia    Pneumonia due to COVID-19 virus     COVID 19 PNA  Acute Hypoxic resp failure on heated high flow  DM poor control   Depression  Bi polar  Obesity BMI at 33  CXR with Multifocal PNA  Serum Beta D glucan +Ve        She remains very ill from on going resp failure and multifocal PNA and WBC elevation from steroids and BG HIGH worsening from Dexamethasone use  Given the steroid use and positive Beta D glucan assay concern for any invasive fungal process will do additional testing        Labs, Microbiology, Radiology and all the pertinent results from current hospitalization and  care every where were reviewed  by me as a part of the evaluation   Plan:   1. Wean off Dexamethasone   2. May have to discontinue Barcitinib if Aspergillus antigen come back positive  3. CXR some improvement   4. Fungal serologies pending    5. Urine Histo antigen pending     6. Sputum cx in process  7. Cont  oral V fend x 200  Mg q 12 hrs  8. Control DM   9. Risk for intubation high     Discussed with patient/Family and Nursing staff     Thanks for allowing me to participate in your patient's care and please call me with any questions or concerns.     Vik Bonds MD  Infectious Disease  [facility]) Physician  Phone: [phone]   Fax : [phone]

## 2021-09-30 NOTE — CARE COORDINATION
[facility]  Hyperglycemia Event      NAME: Angela Mariscal RECORD NUMBER:  [id number]  AGE: 60 y.o. GENDER: female  : 1965  EPISODE DATE:  2021     Data     Recent Labs     21  0818 21  1154 21  1645 21  0754 21  0844   POCGLU 109* 320* 427* 334* 69* 123*       Medications  Scheduled Medications:   insulin glargine  25 Units SubCUTAneous Nightly    budesonide  0.5 mg Nebulization 4x daily    voriconazole  200 mg Oral 2 times per day    insulin lispro  25 Units SubCUTAneous TID WC    baricitinib  4 mg Oral Daily    dexamethasone  10 mg IntraVENous Daily    insulin lispro  0-18 Units SubCUTAneous TID WC    insulin lispro  0-9 Units SubCUTAneous Nightly    aspirin  81 mg Oral Daily    carvedilol  3.125 mg Oral Daily    fluticasone  2 spray Nasal Daily    losartan  100 mg Oral Daily    traZODone  50 mg Oral Nightly    sodium chloride flush  5-40 mL IntraVENous 2 times per day    enoxaparin  30 mg SubCUTAneous BID    atorvastatin  10 mg Oral Daily    pregabalin  200 mg Oral BID       Diet  Current diet/supplement order: ADULT DIET; Regular; 3 carb choices (45 gm/meal); Low Sodium (2 gm)     Recorded PO: PO Meals Eaten (%): 51 - 75% last meal in flowsheets      Action      Physician Notified of event: Yes  Janis Pope DO      Recommend reducing Lantus to 25 units. If continuing with dexamethasone, increase prandial Humalog to 30 units. Consulted pharmacy for assistance with coordination of insulin decreases with steroid decreases.       Responce     Medication plan updated: Yes        Electronically signed by Lupillo Myles RN on 2021 at 9:20 AM

## 2021-10-01 NOTE — PROGRESS NOTES
Infectious Disease Follow up Notes  Admit Date: 9/21/2021  Hospital Day: 11    Antibiotics : IV Dexamethasone  Baricitinib  Voriconazole     CHIEF COMPLAINT:     COVID 19 PNA  ARDS  DM+   Positive Beta D glucan  Subjective interval History :  60 y.o. who has a past medical history of bipolar disorder, chronic back pain, depression, diabetes, hypertension admitted to the hospital secondary to shortness of breath and cough. She works at [employer] she thinks metal exposed at the last visit and tested positive for COVID-19. Was found to be hypoxic at 75% on room air on admission. Also blood glucose elevated at 1 300s. Chest x-ray consistent with COVID-19 pneumonia with bilateral opacities. On admission WBC count normal with ongoing lymphocytopenia secondary to COVID-19 infection. While on dexamethasone her WBC count has increased. She is required heated high flow secondary on ongoing hypoxemia. Serum Beta D glucan +Ve on serological testing and we are consulted for recommendations.       Interval History : remains sob + cough + BG better remains on 50lt  Heated high flow     Past Medical History:    Past Medical History:   Diagnosis Date    Asthma     Bipolar 1 disorder [facility] [address]     Chronic back pain     COVID-19 09/17/2021    DDD (degenerative disc disease)     Depression     Diabetes mellitus ([facility] [address]     Fibromyalgia     Hyperlipidemia     Hypertension        Past Surgical History:    Past Surgical History:   Procedure Laterality Date    CARPAL TUNNEL RELEASE      HAND SURGERY      HYSTERECTOMY         Current Medications:    No outpatient medications have been marked as taking for the 9/21/21 encounter [facility] Encounter).        Allergies:  Latex, Doxycycline, Flagyl [metronidazole], Ibuprofen, Iv dye [iodides], and Strawberry extract    Immunizations :   Immunization History   Administered Date(s) Administered   [facility] Pneumococcal Polysaccharide ([id number]) 06/02/2014       Social History:    Social History     Tobacco Use    Smoking status: Never Smoker    Smokeless tobacco: Never Used   Vaping Use    Vaping Use: Never used   Substance Use Topics    Alcohol use: Not Currently     Comment: 1-2 times a month    Drug use: No     Social History     Tobacco Use   Smoking Status Never Smoker   Smokeless Tobacco Never Used      Family History   Problem Relation Age of Onset    Heart Disease Mother     Heart Disease Father          REVIEW OF SYSTEMS:     Constitutional:  negative for fevers, chills, night sweats  Eyes:  negative for blurred vision, eye discharge, visual disturbance   HEENT:  negative for hearing loss, ear drainage,nasal congestion  Respiratory:   cough+ , shortness of breath + or hemoptysis   Cardiovascular:  negative for chest pain, palpitations, syncope  Gastrointestinal:  negative for nausea, vomiting, diarrhea, constipation, abdominal pain  Genitourinary:  negative for frequency, dysuria, urinary incontinence, hematuria  Hematologic/Lymphatic:  negative for easy bruising, bleeding and lymphadenopathy  Allergic/Immunologic:  negative for recurrent infections, angioedema, anaphylaxis   Endocrine:  negative for weight changes, polyuria, polydipsia and polyphagia  Musculoskeletal:  negative for joint  pain, swelling, decreased range of motion  Integumentary: No rashes, skin lesions  Neurological:  negative for headaches, slurred speech, unilateral weakness  Psychiatric: negative for hallucinations,confusion,agitation.                 PHYSICAL EXAM:      Vitals:    BP (!) 144/77   Pulse 101   Temp 98.4 °F (36.9 °C) (Oral)   Resp 17   Ht 5' 3\" (1.6 m)   Wt 185 lb 10 oz (84.2 kg)   SpO2 92%   BMI 32.88 kg/m²       General Appearance: alert,in  acute distress, ++ pallor, no icterus on heated high flow oxygen   Skin: warm and dry, no rash or erythema  Head: normocephalic and atraumatic  Eyes: pupils equal, 5.5 09/21/2021    PROTEINU 100 09/21/2021    UROBILINOGEN 0.2 09/21/2021    NITRU Negative 09/21/2021    LEUKOCYTESUR Negative 09/21/2021    LABMICR YES 09/21/2021    URINETYPE NotGiven 09/21/2021      Urine Microscopic:   Lab Results   Component Value Date    BACTERIA 1+ 09/21/2021    COMU see below 09/21/2021    HYALCAST 3 09/21/2021    WBCUA 1 09/21/2021    RBCUA 2 09/21/2021    EPIU 3 09/21/2021     Urine Reflex to Culture:   Lab Results   Component Value Date    URRFLXCULT Not Indicated 09/21/2021 9/29/2021  2:19 PM - [facility] Loren[facility] Incoming Lab Results From Soft (Epic Adt)     Component Value Ref Range & Units Status Collected Lab   (1,3)-Beta-D-Glucan (Fungitell) Interpretation PositiveAbnormal   Negative Final 09/27/2021  1:07 PM [facility]   INTERPRETIVE INFORMATION: (1,3)-beta-D-glucan (Fungitell)     Less than 31 pg/mL . .................. Negative     31-59 pg/mL . ......................... Negative     60-79 pg/mL . ......................... Indeterminate     Greater than or equal to 80 pg/mL . ... Positive   The Fungitell test is indicated for presumptive diagnosis   of fungal infection and should be used in conjunction with   other diagnostic procedures. This test does not detect   certain fungal species such as Cryptococcus, which produce   very low levels of (1,3)-beta-D-glucan. This test will not   detect the zygomycetes, such as Absidia, Mucor, and   Rhizopus, which are not known to produce   (1,3)-beta-D-glucan. In addition, the yeast phase of        MICRO: cultures reviewed and updated by me   Blood Culture: No results found for: [facility], BLOODCULT2    Respiratory Culture:  Lab Results   Component Value Date    CULTRESP Further report to follow 09/30/2021     AFB:No results found for: [facility]  Viral Culture:  Lab Results   Component Value Date    COVID19 Detected 09/17/2021     Urine Culture: No results for input(s): Philipp Montez in the last 72 hours.       IMAGING:    XR CHEST PORTABLE   Final Result   Interval improvement in bibasilar opacities consistent with improving   pneumonia. XR CHEST PORTABLE   Final Result   Bibasilar opacities are favored to represent pneumonia.                All the pertinent images and reports for the current Hospitalization were reviewed by me     Scheduled Meds:   dexamethasone  6 mg IntraVENous Daily    insulin glargine  25 Units SubCUTAneous Nightly    voriconazole  200 mg Oral 2 times per day    fluticasone  2 puff Inhalation BID    insulin lispro  25 Units SubCUTAneous TID WC    baricitinib  4 mg Oral Daily    insulin lispro  0-18 Units SubCUTAneous TID WC    insulin lispro  0-9 Units SubCUTAneous Nightly    aspirin  81 mg Oral Daily    carvedilol  3.125 mg Oral Daily    fluticasone  2 spray Nasal Daily    losartan  100 mg Oral Daily    traZODone  50 mg Oral Nightly    sodium chloride flush  5-40 mL IntraVENous 2 times per day    enoxaparin  30 mg SubCUTAneous BID    atorvastatin  10 mg Oral Daily    pregabalin  200 mg Oral BID       Continuous Infusions:   sodium chloride      sodium chloride      dextrose         PRN Meds:  potassium chloride **OR** potassium alternative oral replacement **OR** potassium chloride, magnesium sulfate, sodium chloride flush, sodium chloride, ondansetron **OR** ondansetron, acetaminophen **OR** acetaminophen, glucose, dextrose, glucagon (rDNA), dextrose, guaiFENesin-dextromethorphan      Assessment:     Patient Active Problem List   Diagnosis    Syncope and collapse    Community acquired bacterial pneumonia    Abnormal cardiovascular stress test    Essential hypertension    Mixed hyperlipidemia    Pneumonia due to COVID-19 virus     COVID 19 PNA  Acute Hypoxic resp failure on heated high flow  DM poor control   Depression  Bi polar  Obesity BMI at 33  CXR with Multifocal PNA  Serum Beta D glucan +Ve        She remains very ill from on going resp failure and multifocal PNA and WBC elevation from steroids and BG HIGH

## 2021-10-01 NOTE — PROGRESS NOTES
Hospitalist Progress Note      PCP: Bart Dorsey    Date of Admission: 2021    SUBJECTIVE:   Up in chair x 5 hrs yesterday; using NRB less; no events overnight            OBJECTIVE:     Vitals    TEMPERATURE:  Current - Temp: 97.2 °F (36.2 °C); Max - Temp  Av.7 °F (36.5 °C)  Min: 97.2 °F (36.2 °C)  Max: 98.4 °F (36.9 °C)  RESPIRATIONS RANGE: Resp  Av.3  Min: 16  Max: 30  PULSE RANGE: Pulse  Av.5  Min: 88  Max: 108  BLOOD PRESSURE RANGE:  Systolic (45WMQ), BQM:532 , Min:118 , JMW:339   ; Diastolic (26XUR), LJR:48, Min:71, Max:83    PULSE OXIMETRY RANGE: SpO2  Av.9 %  Min: 90 %  Max: 97 %  24HR INTAKE/OUTPUT:    Intake/Output Summary (Last 24 hours) at 10/1/2021 1758  Last data filed at 10/1/2021 1014  Gross per 24 hour   Intake 380 ml   Output 500 ml   Net -120 ml       Exam:    General appearance: Well, no apparent distress, appears stated age and cooperative. HEENT Normal cephalic, atraumatic without obvious deformity. Pupils equal, round, and reactive to light. Extra ocular muscles intact. Conjunctivae/corneas clear. Neck: Supple, No jugular venous distention/bruits. Trachea midline without thyromegaly or adenopathy with full range of motion. Lungs: diminished throughout. Heart: Regular rate and rhythm with Normal S1/S2 without  murmurs, rubs or gallops, point of maximum impulse non-displaced  Abdomen: Soft, non-tender or non-distended without rigidity or guarding and positive bowel sounds all four quadrants. Extremities: No clubbing, cyanosis, or edema bilaterally. Full range of motion without deformity and normal gait intact. Skin: Skin color, texture, turgor normal. No rashes or lesions. Neurologic: Alert and oriented X 3,  neurovascularly intact with sensory/motor intact upper extremities/lower extremities, bilaterally. Cranial nerves:II-XII intact, grossly non-focal.  Mental status: Alert, oriented, thought content appropriate.         ASSESSMENT AND PLAN

## 2021-10-02 NOTE — PLAN OF CARE
Problem: Airway Clearance - Ineffective  Goal: Achieve or maintain patent airway  Outcome: Ongoing     Problem: Gas Exchange - Impaired  Goal: Absence of hypoxia  Outcome: Ongoing

## 2021-10-02 NOTE — PROGRESS NOTES
Respiratory Therapy    Pt failed weaning attempt this am  SPO2  96% on NRM plus Airvo 50L FIO2 95%    SPO2 83% on Airvo alone  Replaced NRM  Discussed prone positioning and OOB to chair  Pt is unable to prone but agreeable to OOB    Electronically signed by Megan Ha RCP on 10/2/2021 at 8:08 AM

## 2021-10-02 NOTE — PROGRESS NOTES
Progress Note    Admit Date: 9/21/2021         Subjective and Overnight Events: Pt being followed up for covid pna  Could not be weaned off  Continue care  No fever  No chills         Objective:   Vitals: /68   Pulse 91   Temp 97.6 °F (36.4 °C) (Oral)   Resp 26   Ht 5' 3\" (1.6 m)   Wt 188 lb 0.8 oz (85.3 kg)   SpO2 (!) 83% Comment: replaced NRM  BMI 33.31 kg/m²   /68   Pulse 91   Temp 97.6 °F (36.4 °C) (Oral)   Resp 26   Ht 5' 3\" (1.6 m)   Wt 188 lb 0.8 oz (85.3 kg)   SpO2 (!) 83% Comment: replaced NRM  BMI 33.31 kg/m²     General Appearance:    Alert, cooperative, no distress, appears stated age   Head:    Normocephalic, without obvious abnormality, atraumatic   Eyes:    PERRL, conjunctiva/corneas clear       Ears:    Normal TM's and external ear canals, both ears   Nose:   Nares normal, septum midline, mucosa normal   Throat:   Lips, mucosa, and tongue normal; teeth and gums normal           Lungs:     Clear to auscultation bilaterally, respirations unlabored       Heart:    Regular rate and rhythm, S1 and S2 normal, no murmur, rub    or gallop   Abdomen:     Soft, non-tender, bowel sounds active all four quadrants,     no masses, no organomegaly           Extremities:   Extremities normal, atraumatic, no cyanosis or edema   Pulses:   2+ and symmetric all extremities   Skin:   Skin color, texture, turgor normal, no rashes or lesions       Neurologic:   CNII-XII intact.  Normal strength, sensation and reflexes       throughout     Data:     Scheduled Medications:    dexamethasone  6 mg IntraVENous Daily    insulin glargine  25 Units SubCUTAneous Nightly    voriconazole  200 mg Oral 2 times per day    fluticasone  2 puff Inhalation BID    insulin lispro  25 Units SubCUTAneous TID WC    baricitinib  4 mg Oral Daily    insulin lispro  0-18 Units SubCUTAneous TID WC    insulin lispro  0-9 Units SubCUTAneous Nightly    aspirin  81 mg Oral Daily    carvedilol  3.125 mg Oral Daily    fluticasone  2 spray Nasal Daily    losartan  100 mg Oral Daily    traZODone  50 mg Oral Nightly    sodium chloride flush  5-40 mL IntraVENous 2 times per day    enoxaparin  30 mg SubCUTAneous BID    atorvastatin  10 mg Oral Daily    pregabalin  200 mg Oral BID      PRN Medications: albuterol sulfate HFA, potassium chloride **OR** potassium alternative oral replacement **OR** potassium chloride, magnesium sulfate, sodium chloride flush, sodium chloride, ondansetron **OR** ondansetron, acetaminophen **OR** acetaminophen, glucose, dextrose, glucagon (rDNA), dextrose, guaiFENesin-dextromethorphan  Diet: ADULT DIET; Regular; 3 carb choices (45 gm/meal);  Low Sodium (2 gm)    Continuous Infusions:   sodium chloride      dextrose           Intake/Output Summary (Last 24 hours) at 10/2/2021 1109  Last data filed at 10/1/2021 2328  Gross per 24 hour   Intake 120 ml   Output 400 ml   Net -280 ml       CBC:   Recent Labs     10/01/21  0518 10/02/21  0656   WBC 20.2* 21.2*   HGB 11.2* 11.4*   * 458*     BMP:  Recent Labs     10/01/21  0518 10/02/21  0656    142   K 4.4 4.7    109   CO2 18* 20*   BUN 32* 29*   CREATININE 1.0 0.9   GLUCOSE 174* 187*     ABGs: No results found for: PHART, PO2ART, [id number]    Assessment/plan     Patient Active Problem List:     Syncope and collapse     Community acquired bacterial pneumonia     Abnormal cardiovascular stress test     Essential hypertension     Mixed hyperlipidemia     Pneumonia due to COVID-19 virus      COVID-19 pneumonia  Decadron weaned down to 6 mg  Remdesivir 5 days  baricitnib day 12/14  lovenox bid for anticoagulation  Encourage IS and proning  Elevated 1 3 beta D glucan, V fend per ID  ID following     Acute respiratory failure with hypoxia  Titrate oxygen to keep saturations above 90%  Likely secondary to COVID-19 pneumonia  Oxygen currently down to nasal canula from [facility]     Serum Beta D glucan +ve - urine histo, aspergillus Ag, fungal serologies, sputum Cx; oral V dend per ID per ID     Hypertension  Continue home medications     Hyperlipidemia  Continue home medications     Uncontrolled diabetes mellitus type 2  Hold home medications  Adjust PRN  Carb controlled diet  Hard to control with high-dose Decadron        Full Code    Emmanuel Lyman MD

## 2021-10-02 NOTE — PROGRESS NOTES
Respiratory Theapy    Pt OOB to chair- Pt is able to maintain SPO2 93% without NRM this afternoon while in chair  Current SPO2 93% on Airvo 50L FIO2 93%    Electronically signed by Jose Muñiz RCP on 10/2/2021 at 4:41 PM

## 2021-10-03 NOTE — PROGRESS NOTES
Respiratory Therapy    Failed weaning attempt this am  SPO2 on both NRM and 50L FIO2 95% Airvo 96-97%  SPO2 on Airvo alone 78% within 10 minutes  Counseled pt on IS use and OOB to chair, and moving in the bed.  Pt is agreeable  Will continue to wean settings as tolerated    Electronically signed by Svetlana Summers RCP on 10/3/2021 at 9:34 AM  .

## 2021-10-03 NOTE — PLAN OF CARE
Problem: Airway Clearance - Ineffective  Goal: Achieve or maintain patent airway  10/3/2021 1453 by Tata Milligan RN  Outcome: Ongoing     Problem: Gas Exchange - Impaired  Goal: Absence of hypoxia  10/3/2021 1453 by Tata Milligan RN  Outcome: Ongoing

## 2021-10-03 NOTE — PROGRESS NOTES
losartan  100 mg Oral Daily    traZODone  50 mg Oral Nightly    sodium chloride flush  5-40 mL IntraVENous 2 times per day    enoxaparin  30 mg SubCUTAneous BID    atorvastatin  10 mg Oral Daily    pregabalin  200 mg Oral BID      PRN Medications: albuterol sulfate HFA, potassium chloride **OR** potassium alternative oral replacement **OR** potassium chloride, magnesium sulfate, sodium chloride flush, sodium chloride, ondansetron **OR** ondansetron, acetaminophen **OR** acetaminophen, glucose, dextrose, glucagon (rDNA), dextrose, guaiFENesin-dextromethorphan  Diet: ADULT DIET; Regular; 3 carb choices (45 gm/meal);  Low Sodium (2 gm)    Continuous Infusions:   sodium chloride      dextrose           Intake/Output Summary (Last 24 hours) at 10/3/2021 1019  Last data filed at 10/3/2021 0842  Gross per 24 hour   Intake --   Output 1600 ml   Net -1600 ml       CBC:   Recent Labs     10/02/21  0656 10/03/21  0708   WBC 21.2* 19.9*   HGB 11.4* 10.6*   * 443     BMP:  Recent Labs     10/02/21  0656 10/03/21  0708    133*   K 4.7 5.1    102   CO2 20* 19*   BUN 29* 33*   CREATININE 0.9 0.9   GLUCOSE 187* 132*     ABGs: No results found for: PHART, PO2ART, [id number]    Assessment/plan     Patient Active Problem List:     Syncope and collapse     Community acquired bacterial pneumonia     Abnormal cardiovascular stress test     Essential hypertension     Mixed hyperlipidemia     Pneumonia due to COVID-19 virus      COVID-19 pneumonia  Decadron weaned down to 6 mg  Remdesivir 5 days  baricitnib day 13/14  lovenox bid for anticoagulation  Encourage IS and proning  Elevated 1 3 beta D glucan, V fend per ID  ID following     Acute respiratory failure with hypoxia  Titrate oxygen to keep saturations above 90%  Likely secondary to COVID-19 pneumonia  Oxygen currently down [facility]ula [facility]     Serum Beta D glucan +ve - urine histo, aspergillus Ag, fungal serologies, sputum Cx; oral V dend per ID per ID     Hypertension  Continue home medications     Hyperlipidemia  Continue home medications     Uncontrolled diabetes mellitus type 2  Hold home medications  On Basal and ISS - Adjust PRN - decadron related   Carb controlled diet          Full Code    Domenic Anderson MD

## 2021-10-04 NOTE — PROGRESS NOTES
Hospitalist Progress Note      PCP: J Carloscecilia     Date of Admission: 2021    Subjective: hasnt had a BM in a week per pt, still feeling very SOB with minimal ambulation. Still on 80 FiO2 and 60L O2    Medications:  Reviewed    Infusion Medications    sodium chloride      dextrose       Scheduled Medications    insulin glargine  25 Units SubCUTAneous Nightly    voriconazole  200 mg Oral 2 times per day    fluticasone  2 puff Inhalation BID    insulin lispro  25 Units SubCUTAneous TID WC    insulin lispro  0-18 Units SubCUTAneous TID WC    insulin lispro  0-9 Units SubCUTAneous Nightly    aspirin  81 mg Oral Daily    carvedilol  3.125 mg Oral Daily    fluticasone  2 spray Nasal Daily    losartan  100 mg Oral Daily    traZODone  50 mg Oral Nightly    sodium chloride flush  5-40 mL IntraVENous 2 times per day    enoxaparin  30 mg SubCUTAneous BID    atorvastatin  10 mg Oral Daily    pregabalin  200 mg Oral BID     PRN Meds: albuterol sulfate HFA, potassium chloride **OR** potassium alternative oral replacement **OR** potassium chloride, magnesium sulfate, sodium chloride flush, sodium chloride, ondansetron **OR** ondansetron, acetaminophen **OR** acetaminophen, glucose, dextrose, glucagon (rDNA), dextrose, guaiFENesin-dextromethorphan      Intake/Output Summary (Last 24 hours) at 10/4/2021 1630  Last data filed at 10/4/2021 1147  Gross per 24 hour   Intake 1220 ml   Output 1900 ml   Net -680 ml       Physical Exam Performed:    BP 99/65   Pulse 103   Temp 98 °F (36.7 °C) (Axillary)   Resp 24   Ht 5' 3\" (1.6 m)   Wt 187 lb 13.3 oz (85.2 kg)   SpO2 95%   BMI 33.27 kg/m²        General appearance: on high flow O2  HEENT Normal cephalic, atraumatic without obvious deformity. Pupils equal, round, and reactive to light. Extra ocular muscles intact. Conjunctivae/corneas clear. Neck: Supple, No jugular venous distention/bruits.  Trachea midline without thyromegaly or adenopathy with full range of motion. Lungs: diminished throughout. Heart: Regular rhythm, tachy with Normal S1/S2  Abdomen: Soft, non-tender or non-distended without rigidity or guarding and positive bowel sounds  Extremities: No clubbing, cyanosis, or edema bilaterally. Full range of motion without deformity and normal gait intact. Skin: Skin color, texture, turgor normal. No rashes or lesions. Neurologic: Alert and oriented X 3,  neurovascularly intact with sensory/motor intact upper extremities/lower extremities, bilaterally. Cranial nerves:II-XII intact, grossly non-focal.  Mental status: Alert, oriented, thought content appropriate.       Labs:   Recent Labs     10/02/21  0656 10/03/21  0708 10/04/21  0701   WBC 21.2* 19.9* 25.4*   HGB 11.4* 10.6* 10.5*   HCT 35.9* 35.2* 33.9*   * 443 415     Recent Labs     10/02/21  0656 10/03/21  0708 10/04/21  0701    133* 137   K 4.7 5.1 4.9    102 106   CO2 20* 19* 19*   BUN 29* 33* 34*   CREATININE 0.9 0.9 1.0   CALCIUM 9.5 9.3 8.9     Recent Labs     10/02/21  0656 10/03/21  0708 10/04/21  0701   AST 40* 45* 31   ALT 62* 58* 50*   BILITOT 0.4 0.4 0.5   ALKPHOS 193* 166* 163*     No results for input(s): INR in the last 72 hours. No results for input(s): Kaleen Hope in the last 72 hours. Urinalysis:      Lab Results   Component Value Date    NITRU Negative 09/21/2021    WBCUA 1 09/21/2021    BACTERIA 1+ 09/21/2021    RBCUA 2 09/21/2021    BLOODU Negative 09/21/2021    SPECGRAV >1.030 09/21/2021    GLUCOSEU >=1000 09/21/2021       Radiology:  XR CHEST PORTABLE   Final Result   Interval improvement in bibasilar opacities consistent with improving   pneumonia. XR CHEST PORTABLE   Final Result   Bibasilar opacities are favored to represent pneumonia.                  Assessment/Plan:    Active Hospital Problems    Diagnosis     Pneumonia due to COVID-19 virus [U07.1, J12.82]           COVID-19 pneumonia  Decadron weaned down to 6 mg  Completed Remdesivir 5 days, baricitnib day 14/14  lovenox bid for anticoagulation  Encourage IS and proning  Elevated 1 3 beta D glucan, V fend per ID  ID following     Acute respiratory failure with hypoxia  Titrate oxygen to keep saturations above 90%  Likely secondary to COVID-19 pneumonia  On 80 fiO2 and 60L O2     Serum Beta D glucan +ve - urine histo, aspergillus Ag, fungal serologies, sputum Cx; oral Vfend per ID     Hypertension  BP soft, cont coreg, decrease losartan     Hyperlipidemia  Continue home medications     Uncontrolled diabetes mellitus type 2  Hold home medications  On Basal and ISS - Adjust PRN - decadron related   Carb controlled diet    Constipation - try enema in am, add miralax      Diet: ADULT DIET; Regular; 3 carb choices (45 gm/meal);  Low Sodium (2 gm)  Code Status: Full Code    PT/OT Eval Status: ordered    Shantelle Lee MD

## 2021-10-04 NOTE — CARE COORDINATION
Discharge Planning:   Per chart review, patient now requiring 60 LO2 nasal cannula with NRB at 80 FiO2. Standby assist to get to commode. SW to follow and assist as needed.    Marcianne Mcardle, MSW, MARYW, Social Work/Case Management   [phone]  Electronically signed by Marcianne Mcardle, MSW, RADHA on 10/4/2021 at 3:23 PM

## 2021-10-04 NOTE — PROGRESS NOTES
Attempted to wean Fio2 today, however patient desat's significantly with minimal exertion and required increase back to 100%. Pt currently on 60L/100% plus NRB.

## 2021-10-04 NOTE — PROGRESS NOTES
Pt's O2 increased from 50 to 60lpm via airvo at 95% FiO2 at this time. Just prior to this, pt's SpO2 had been sustaining in the low 80%'s after returning to bed from the bedside commode despite pt being given time to recover. Pt's SpO2 now at 92% with the 60lpm airvo. Will continue to monitor O2 demand and SpO2 closely.

## 2021-10-04 NOTE — PLAN OF CARE
Problem: Airway Clearance - Ineffective  Goal: Achieve or maintain patent airway  10/4/2021 1626 by Delfino Perez RN  Outcome: Ongoing     Problem: Gas Exchange - Impaired  Goal: Absence of hypoxia  10/4/2021 1626 by Delfino Perez RN  Outcome: Ongoing     Problem: Gas Exchange - Impaired  Goal: Promote optimal lung function  10/4/2021 0517 by Tran Schmitz RN  Outcome: Ongoing     Problem: Gas Exchange - Impaired  Goal: Promote optimal lung function  10/4/2021 0517 by Tran Schmitz RN  Outcome: Ongoing

## 2021-10-04 NOTE — PROGRESS NOTES
Infectious Disease Follow up Notes  Admit Date: 9/21/2021  Hospital Day: 14    Antibiotics :   IV Dexamethasone completed the course    Baricitinib  Voriconazole     CHIEF COMPLAINT:     COVID 19 PNA  ARDS  DM+   Positive Beta D glucan  Subjective interval History :  60 y.o. who has a past medical history of bipolar disorder, chronic back pain, depression, diabetes, hypertension admitted to the hospital secondary to shortness of breath and cough. She works at [employer] she thinks metal exposed at the last visit and tested positive for COVID-19. Was found to be hypoxic at 75% on room air on admission. Also blood glucose elevated at 1 300s. Chest x-ray consistent with COVID-19 pneumonia with bilateral opacities. On admission WBC count normal with ongoing lymphocytopenia secondary to COVID-19 infection. While on dexamethasone her WBC count has increased. She is required heated high flow secondary on ongoing hypoxemia. Serum Beta D glucan +Ve on serological testing and we are consulted for recommendations.       Interval History : remains sob + hypoxic with exertion no sputum cough + and no fevers WBC elevated from steroids    Past Medical History:    Past Medical History:   Diagnosis Date    Asthma     Bipolar 1 disorder ([facility] [address]     Chronic back pain     COVID-19 09/17/2021    DDD (degenerative disc disease)     Depression     Diabetes mellitus ([facility] [address] [facility]     Fibromyalgia     Hyperlipidemia     Hypertension        Past Surgical History:    Past Surgical History:   Procedure Laterality Date    CARPAL TUNNEL RELEASE      HAND SURGERY      HYSTERECTOMY         Current Medications:    No outpatient medications have been marked as taking for the 9/21/21 encounter [facility] Encounter).        Allergies:  Latex, Doxycycline, Flagyl [metronidazole], Ibuprofen, Iv dye [iodides], and Strawberry extract    Immunizations :   Immunization History   Administered Date(s) Administered    Pneumococcal Polysaccharide ([id number]) 06/02/2014       Social History:    Social History     Tobacco Use    Smoking status: Never Smoker    Smokeless tobacco: Never Used   Vaping Use    Vaping Use: Never used   Substance Use Topics    Alcohol use: Not Currently     Comment: 1-2 times a month    Drug use: No     Social History     Tobacco Use   Smoking Status Never Smoker   Smokeless Tobacco Never Used      Family History   Problem Relation Age of Onset    Heart Disease Mother     Heart Disease Father          REVIEW OF SYSTEMS:     Constitutional:  negative for fevers, chills, night sweats  Eyes:  negative for blurred vision, eye discharge, visual disturbance   HEENT:  negative for hearing loss, ear drainage,nasal congestion  Respiratory:   cough+ , shortness of breath + or hemoptysis   Cardiovascular:  negative for chest pain, palpitations, syncope  Gastrointestinal:  negative for nausea, vomiting, diarrhea, constipation, abdominal pain  Genitourinary:  negative for frequency, dysuria, urinary incontinence, hematuria  Hematologic/Lymphatic:  negative for easy bruising, bleeding and lymphadenopathy  Allergic/Immunologic:  negative for recurrent infections, angioedema, anaphylaxis   Endocrine:  negative for weight changes, polyuria, polydipsia and polyphagia  Musculoskeletal:  negative for joint  pain, swelling, decreased range of motion  Integumentary: No rashes, skin lesions  Neurological:  negative for headaches, slurred speech, unilateral weakness  Psychiatric: negative for hallucinations,confusion,agitation.                 PHYSICAL EXAM:      Vitals:    /66   Pulse 101   Temp 97.7 °F (36.5 °C) (Oral)   Resp 22   Ht 5' 3\" (1.6 m)   Wt 187 lb 13.3 oz (85.2 kg)   SpO2 (!) 85%   BMI 33.27 kg/m²       General Appearance: alert,in  acute distress, ++ pallor, no icterus on heated high flow oxygen   Skin: warm and dry, no rash or erythema  Head: normocephalic and atraumatic  Eyes: pupils equal, round, and reactive to light, conjunctivae normal  ENT: tympanic membrane, external ear and ear canal normal bilaterally, nose without deformity, nasal mucosa and turbinates normal without polyps  Neck: supple and non-tender without mass, no thyromegaly  no cervical lymphadenopathy  Pulmonary/Chest: Bi basal crepts++  no wheezes, rales or rhonchi, normal air movement, in  respiratory distress  Cardiovascular: normal rate, regular rhythm, normal S1 and S2, no murmurs, rubs, clicks, or gallops, no carotid bruits  Abdomen: soft, non-tender, non-distended, normal bowel sounds, no masses or organomegaly  Extremities: no cyanosis, clubbing or edema  Musculoskeletal: normal range of motion, no joint swelling, deformity or tenderness  Integumentary: No rashes, no abnormal skin lesions, no petechiae  Neurologic: reflexes normal and symmetric, no cranial nerve deficit  Psych:  Orientation, sensorium, mood normal            Lines: IV     Data Review:    CBC:   Lab Results   Component Value Date    WBC 25.4 (H) 10/04/2021    HGB 10.5 (L) 10/04/2021    HCT 33.9 (L) 10/04/2021    MCV 93.0 10/04/2021     10/04/2021     RENAL:   Lab Results   Component Value Date    CREATININE 1.0 10/04/2021    BUN 34 (H) 10/04/2021     10/04/2021    K 4.9 10/04/2021     10/04/2021    CO2 19 (L) 10/04/2021     SED RATE: No results found for: SEDRATE  CK: No results found for: CKTOTAL  CRP: No results found for: CRP  Hepatic Function Panel:   Lab Results   Component Value Date    ALKPHOS 163 10/04/2021    ALT 50 10/04/2021    AST 31 10/04/2021    PROT 6.3 10/04/2021    BILITOT 0.5 10/04/2021    BILIDIR <0.2 02/05/2016    IBILI see below 02/05/2016    LABALBU 3.2 10/04/2021     UA:  Lab Results   Component Value Date    COLORU YELLOW 09/21/2021    CLARITYU Clear 09/21/2021    GLUCOSEU >=1000 09/21/2021    BILIRUBINUR Negative 09/21/2021    KETUA 15 09/21/2021    SPECGRAV >1.030 09/21/2021    BLOODU Negative 09/21/2021    PHUR 5.5 09/21/2021    PROTEINU 100 09/21/2021    UROBILINOGEN 0.2 09/21/2021    NITRU Negative 09/21/2021    LEUKOCYTESUR Negative 09/21/2021    LABMICR YES 09/21/2021    URINETYPE NotGiven 09/21/2021      Urine Microscopic:   Lab Results   Component Value Date    BACTERIA 1+ 09/21/2021    COMU see below 09/21/2021    HYALCAST 3 09/21/2021    WBCUA 1 09/21/2021    RBCUA 2 09/21/2021    EPIU 3 09/21/2021     Urine Reflex to Culture:   Lab Results   Component Value Date    URRFLXCULT Not Indicated 09/21/2021 9/29/2021  2:19 PM - Manley Aschoff Incoming Lab Results From Soft (Epic Adt)     Component Value Ref Range & Units Status Collected Lab   (1,3)-Beta-D-Glucan (Fungitell) Interpretation PositiveAbnormal   Negative Final 09/27/2021  1:07 PM [facility]   INTERPRETIVE INFORMATION: (1,3)-beta-D-glucan (Fungitell)     Less than 31 pg/mL . .................. Negative     31-59 pg/mL . ......................... Negative     60-79 pg/mL . ......................... Indeterminate     Greater than or equal to 80 pg/mL . ... Positive   The Fungitell test is indicated for presumptive diagnosis   of fungal infection and should be used in conjunction with   other diagnostic procedures. This test does not detect   certain fungal species such as Cryptococcus, which produce   very low levels of (1,3)-beta-D-glucan. This test will not   detect the zygomycetes, such as Absidia, Mucor, and   Rhizopus, which are not known to produce   (1,3)-beta-D-glucan.  In addition, the yeast phase of        MICRO: cultures reviewed and updated by me   Blood Culture: No results found for: [facility], BLOODCULT2    Respiratory Culture:  Lab Results   Component Value Date    CULTRESP Normal respiratory mariza 09/30/2021    LABGRAM  09/30/2021     2+ WBC's (Polymorphonuclear)  2+ Epithelial Cells  3+ Gram positive cocci  3+ Gram positive rods  2+ Gram negative rods       AFB:No results found for: AFBSMEAR  Viral Culture:  Lab Results Component Value Date    COVID19 Detected 09/17/2021     Urine Culture: No results for input(s): Flora Chang in the last 72 hours. IMAGING:    XR CHEST PORTABLE   Final Result   Interval improvement in bibasilar opacities consistent with improving   pneumonia. XR CHEST PORTABLE   Final Result   Bibasilar opacities are favored to represent pneumonia.                All the pertinent images and reports for the current Hospitalization were reviewed by me     Scheduled Meds:   insulin glargine  25 Units SubCUTAneous Nightly    voriconazole  200 mg Oral 2 times per day    fluticasone  2 puff Inhalation BID    insulin lispro  25 Units SubCUTAneous TID WC    baricitinib  4 mg Oral Daily    insulin lispro  0-18 Units SubCUTAneous TID WC    insulin lispro  0-9 Units SubCUTAneous Nightly    aspirin  81 mg Oral Daily    carvedilol  3.125 mg Oral Daily    fluticasone  2 spray Nasal Daily    losartan  100 mg Oral Daily    traZODone  50 mg Oral Nightly    sodium chloride flush  5-40 mL IntraVENous 2 times per day    enoxaparin  30 mg SubCUTAneous BID    atorvastatin  10 mg Oral Daily    pregabalin  200 mg Oral BID       Continuous Infusions:   sodium chloride      dextrose         PRN Meds:  albuterol sulfate HFA, potassium chloride **OR** potassium alternative oral replacement **OR** potassium chloride, magnesium sulfate, sodium chloride flush, sodium chloride, ondansetron **OR** ondansetron, acetaminophen **OR** acetaminophen, glucose, dextrose, glucagon (rDNA), dextrose, guaiFENesin-dextromethorphan      Assessment:     Patient Active Problem List   Diagnosis    Syncope and collapse    Community acquired bacterial pneumonia    Abnormal cardiovascular stress test    Essential hypertension    Mixed hyperlipidemia    Pneumonia due to COVID-19 virus     COVID 19 PNA  Acute Hypoxic resp failure on heated high flow  DM poor control   Depression  Bi polar  Obesity BMI at 33  CXR with Multifocal PNA  Serum Beta D glucan +Ve        She remains very ill from on going resp failure and multifocal PNA and WBC elevation from steroids and BG HIGH worsening from Dexamethasone use  Given the steroid use and positive Beta D glucan assay concern for any invasive fungal process will do additional testing      Urine antigen testing  -ve Aspergillus antigen -ve Fungal antibodies if negative may complete empiric course with V fend x 4 weeks     Labs, Microbiology, Radiology and all the pertinent results from current hospitalization and  care every where were reviewed  by me as a part of the evaluation   Plan:   1. Wean off Dexamethasone stop date 10/3 and lower the dose  2. discontinue Barcitinib   3. CXR some improvement   4. Fungal serologies pending    5. Urine Histo antigen pending     6. Sputum cx Normal mariza  7. Cont  oral V fend x 200  Mg q 12 hrs x 3 weeks to go  8. Control DM   9. Risk for intubation high  10. Repeat Beta D glucan tomorrow     Will sign off       Discussed with patient/Family and Nursing staff     Thanks for allowing me to participate in your patient's care and please call me with any questions or concerns.     Kinjal Woodruff MD  Infectious Disease  [facility]) Physician  Phone: [phone]   Fax : [phone]

## 2021-10-05 NOTE — PLAN OF CARE
Problem: Airway Clearance - Ineffective  Goal: Achieve or maintain patent airway  10/5/2021 0450 by Ama Hicks RN  Outcome: Ongoing     Problem: Gas Exchange - Impaired  Goal: Absence of hypoxia  10/5/2021 0450 by Ama Hicks RN  Outcome: Ongoing     Problem: Gas Exchange - Impaired  Goal: Promote optimal lung function  10/5/2021 0450 by Ama Hicks RN  Outcome: Ongoing     Problem: Breathing Pattern - Ineffective  Goal: Ability to achieve and maintain a regular respiratory rate  10/5/2021 0450 by Ama Hicks RN  Outcome: Ongoing     Problem: Body Temperature -  Risk of, Imbalanced  Goal: Ability to maintain a body temperature within defined limits  10/5/2021 0450 by Ama Hicks RN  Outcome: Ongoing     Problem: Body Temperature -  Risk of, Imbalanced  Goal: Will regain or maintain usual level of consciousness  10/5/2021 0450 by Ama Hicks RN  Outcome: Ongoing     Problem:  Body Temperature -  Risk of, Imbalanced  Goal: Complications related to the disease process, condition or treatment will be avoided or minimized  10/5/2021 0450 by Ama Hicks RN  Outcome: Ongoing     Problem: Isolation Precautions - Risk of Spread of Infection  Goal: Prevent transmission of infection  10/5/2021 0450 by Ama Hicks RN  Outcome: Ongoing     Problem: Nutrition Deficits  Goal: Optimize nutritional status  10/5/2021 0450 by Ama Hicks RN  Outcome: Ongoing     Problem: Risk for Fluid Volume Deficit  Goal: Maintain normal heart rhythm  10/5/2021 0450 by Ama Hicks RN  Outcome: Ongoing     Problem: Risk for Fluid Volume Deficit  Goal: Maintain absence of muscle cramping  10/5/2021 0450 by Ama Hicks RN  Outcome: Ongoing     Problem: Risk for Fluid Volume Deficit  Goal: Maintain normal serum potassium, sodium, calcium, phosphorus, and pH  10/5/2021 0450 by Ama Hicks RN  Outcome: Ongoing     Problem: Loneliness or Risk for Loneliness  Goal: Demonstrate positive use of time alone when socialization is not possible  10/5/2021 0450 by Rohini Ferguson RN  Outcome: Ongoing     Problem: Fatigue  Goal: Verbalize increase energy and improved vitality  10/5/2021 0450 by Rohini Ferguson RN  Outcome: Ongoing     Problem: Patient Education: Go to Patient Education Activity  Goal: Patient/Family Education  10/5/2021 0450 by Rohini Ferguson RN  Outcome: Ongoing     Problem: Pain:  Goal: Pain level will decrease  Description: Pain level will decrease  10/5/2021 0450 by Rohini Ferguson RN  Outcome: Ongoing     Problem: Pain:  Goal: Control of acute pain  Description: Control of acute pain  10/5/2021 0450 by Rohini Ferguson RN  Outcome: Ongoing     Problem: Pain:  Goal: Control of chronic pain  Description: Control of chronic pain  10/5/2021 0450 by Rohini Ferguson RN  Outcome: Ongoing     Problem: Pain:  Goal: Patient's pain/discomfort is manageable  Description: Patient's pain/discomfort is manageable  Outcome: Ongoing     Problem: Infection:  Goal: Will remain free from infection  Description: Will remain free from infection  Outcome: Ongoing     Problem: Safety:  Goal: Free from accidental physical injury  Description: Free from accidental physical injury  Outcome: Ongoing     Problem: Safety:  Goal: Free from intentional harm  Description: Free from intentional harm  Outcome: Ongoing     Problem: Daily Care:  Goal: Daily care needs are met  Description: Daily care needs are met  Outcome: Ongoing     Problem: Skin Integrity:  Goal: Skin integrity will stabilize  Description: Skin integrity will stabilize  Outcome: Ongoing     Problem: Discharge Planning:  Goal: Patients continuum of care needs are met  Description: Patients continuum of care needs are met  Outcome: Ongoing

## 2021-10-05 NOTE — PROGRESS NOTES
1525: RN afternoon reassessment completed. Pt steadily declining from previous assessment. Pt's HR 130s, RR 48, SpO2 80% on 60L AirVo + NRB. RN encouraged deep breathing exercises, repositioned pt. SpO2 slightly improved to 85%. Pt educated on possible need for BiPap. Pt agreeable. MD Richard paged. 1540: ABG, CXR, Pulmonology consult, and BiPap orders placed by MD Danielle Prado. BiPap taken into room by RT Yesica Lin and this RN. Vitals remained unstable after placement of BiPap. See flowsheets. [phone]: RN paged MD Bel Vu with pulmonology regarding pt status. BiPap setting adjusted per RT, but pt still breathing 40-50 bpm, saturations at 87-89% on high BiPap settings. RN spoke with MD Danielle Prado and MD Bel Vu regarding potential need for ICU.     1800: Decision made to transfer to ICU. Pt updated on POC.  Aly Garces called by this RN and updated on transfer to 2124.     [phone]: Pt transferred with all belongings to ICU by RNx2 and RT. Pt arrived in 2124 with ICU RN at bedside with ICU RT. Report given after pt was settled into room. ICU RN Eagle Berry aware to call this RN back with any further questions.      Electronically signed by Bertha Burnham RN on 10/5/2021 at 5:25 PM

## 2021-10-05 NOTE — PROGRESS NOTES
Message to Dr. Genia Balderas. Pts HR is in the 120s, RR is 40. See new orders for Morphine and ativan.

## 2021-10-05 NOTE — PROGRESS NOTES
Hospitalist Progress Note      PCP: Shan Rasmussen    Date of Admission: 9/21/2021    Subjective: pt still not feeling well, appears tachypneic, currently on 100 FiO2 and 60L O2    Medications:  Reviewed    Infusion Medications    sodium chloride      dextrose       Scheduled Medications    insulin lispro  8 Units SubCUTAneous TID WC    insulin lispro  0-12 Units SubCUTAneous TID WC    insulin lispro  0-6 Units SubCUTAneous Nightly    losartan  50 mg Oral Daily    insulin glargine  25 Units SubCUTAneous Nightly    voriconazole  200 mg Oral 2 times per day    fluticasone  2 puff Inhalation BID    aspirin  81 mg Oral Daily    carvedilol  3.125 mg Oral Daily    fluticasone  2 spray Nasal Daily    traZODone  50 mg Oral Nightly    sodium chloride flush  5-40 mL IntraVENous 2 times per day    enoxaparin  30 mg SubCUTAneous BID    atorvastatin  10 mg Oral Daily    pregabalin  200 mg Oral BID     PRN Meds: sodium chloride, albuterol sulfate HFA, potassium chloride **OR** potassium alternative oral replacement **OR** potassium chloride, magnesium sulfate, sodium chloride flush, sodium chloride, ondansetron **OR** ondansetron, acetaminophen **OR** acetaminophen, glucose, dextrose, glucagon (rDNA), dextrose, guaiFENesin-dextromethorphan      Intake/Output Summary (Last 24 hours) at 10/5/2021 1541  Last data filed at 10/5/2021 1524  Gross per 24 hour   Intake 1020 ml   Output 2100 ml   Net -1080 ml       Physical Exam Performed:    /72   Pulse 103   Temp 98 °F (36.7 °C) (Axillary)   Resp 24   Ht 5' 3\" (1.6 m)   Wt 188 lb 4.4 oz (85.4 kg)   SpO2 92%   BMI 33.35 kg/m²     General appearance: tachypneic  HEENT Normal cephalic, atraumatic without obvious deformity. Pupils equal, round, and reactive to light.  Extra ocular muscles intact. Conjunctivae/corneas clear. Neck: Supple, No jugular venous distention/bruits.  Trachea midline without thyromegaly or adenopathy with full range of motion. Lungs: diminished throughout, basal crepts  Heart: Regular rhythm, tachy with Normal S1/S2  Abdomen: Soft, non-tender or non-distended without rigidity or guarding and positive bowel sounds  Extremities: No clubbing, cyanosis, or edema bilaterally. Skin: Skin color, texture, turgor normal. No rashes or lesions. Neurologic: Awake,  neurovascularly intact with sensory/motor intact upper extremities/lower extremities, bilaterally. Cranial nerves:II-XII intact, grossly non-focal.  Mental status: Awake      Labs:   Recent Labs     10/03/21  0708 10/04/21  0701 10/05/21  0848   WBC 19.9* 25.4* 27.6*   HGB 10.6* 10.5* 10.5*   HCT 35.2* 33.9* 33.5*    415 407     Recent Labs     10/03/21  0708 10/04/21  0701 10/05/21  0848   * 137 137   K 5.1 4.9 4.6    106 102   CO2 19* 19* 18*   BUN 33* 34* 35*   CREATININE 0.9 1.0 1.0   CALCIUM 9.3 8.9 9.1     Recent Labs     10/03/21  0708 10/04/21  0701 10/05/21  0848   AST 45* 31 37   ALT 58* 50* 39   BILITOT 0.4 0.5 0.6   ALKPHOS 166* 163* 166*     No results for input(s): INR in the last 72 hours. No results for input(s): Patrisha Meigs in the last 72 hours. Urinalysis:      Lab Results   Component Value Date    NITRU Negative 09/21/2021    WBCUA 1 09/21/2021    BACTERIA 1+ 09/21/2021    RBCUA 2 09/21/2021    BLOODU Negative 09/21/2021    SPECGRAV >1.030 09/21/2021    GLUCOSEU >=1000 09/21/2021       Radiology:  XR CHEST PORTABLE   Final Result   Interval improvement in bibasilar opacities consistent with improving   pneumonia. XR CHEST PORTABLE   Final Result   Bibasilar opacities are favored to represent pneumonia.          XR CHEST PORTABLE    (Results Pending)           Assessment/Plan:    Active Hospital Problems    Diagnosis     Pneumonia due to COVID-19 virus [U07.1, J12.82]        COVID-19 pneumonia  Decadron weaned down to 6 mg - now off  Completed Remdesivir 5 days, baricitnib day 14/14  lovenox bid for anticoagulation  Encouraged IS and proning  Elevated 1 3 beta D glucan, V fend per ID  ID following  Will consult pulm and recheck CXR today     Acute respiratory failure with hypoxia  Titrate oxygen to keep saturations above 90%  Likely secondary to COVID-19 pneumonia  On 80-->100 fiO2 and 60L O2  Place on BIPAP and check ABG, pulm consulted     Serum Beta D glucan +ve - urine histo, aspergillus Ag, fungal serologies, sputum Cx; oral Vfend per ID     Hypertension  BP soft, cont coreg, decreased losartan     Hyperlipidemia  Continue home medications     Uncontrolled diabetes mellitus type 2  Hold home medications  On Basal and ISS - Adjust PRN - decadron related   Carb controlled diet     Constipation - added miralax         Diet: ADULT DIET; Regular; 3 carb choices (45 gm/meal); Low Sodium (2 gm)  Code Status: Full Code    PT/OT Eval Status: ordered    Dispo - cont care, pulm consulted, await ABG, place on BIPAP.      Krystal Christian MD

## 2021-10-06 NOTE — CONSULTS
fibromyalgia and hypertension presented to [facility] with shortness of breath. Patient diagnosed with COVID-19. Palliative Medicine SymptomScreening/ROS:    Review of Systems   Unable to perform ROS: Mental status change   All other systems reviewed and are negative. Patient unable to complete full ROS due to current cognitive status. Information that is obtained from nursing and chart. Pain:  Patient did not appear to be in pain today. Home med list and hospital medications reviewed in chart as of 10/6/2021     EXAM     Vitals:    10/06/21 1208   BP:    Pulse:    Resp: 27   Temp:    SpO2:        Physical Exam  Vitals reviewed. Constitutional:       Appearance: She is ill-appearing. Due to the current efforts to prevent transmission of COVID-19 and also the need to preserve PPE for other caregivers, a face-to-face encounter with the patient was not performed. That being said, all relevant records and diagnostic tests were reviewed, including laboratory results and imaging. Please reference any relevant documentation elsewhere. Care will be coordinated with the primary service.          Current labs in the epic chart reviewed as of 10/6/2021   Review of previous notes, admits, labs, radiology and testing relevant to this consult done in this chart today 10/6/2021      Total time: 84 minutes  >50% of time spent counseling patient at bedside or POA/family member if applicable , reviewing information and discussing care, coordinating with care team  Signed By: Electronically signed by JORGE Parks CNP on 10/6/2021 at 1:58 PM  Palliative Medicine   [phone]    October 6, 2021

## 2021-10-06 NOTE — CARE COORDINATION
Patient transferred to ICU. Now on Bipap. Will continue to monitor for care coordination needs.     Vito Major RN, BSN, Case Management  Phone: [phone]  Electronically signed by Vito Major RN on 10/6/2021 at 8:37 AM

## 2021-10-06 NOTE — PROGRESS NOTES
Worsening blood sugars, even giving IV insulin one-time bolus did not help, decided start patient on DKA protocol although pending labs at this time

## 2021-10-06 NOTE — PROGRESS NOTES
Order for PICC line per Dr. Andrea Lopez. Pre procedure and timeout done with pt's RN. Successful insertion of a Triple lumen PICC line into pt's right basilic vein. No issues gaining access or advancing guidewire/introducer/PICC line. PICC tip terminates in the SVC according to [facility] 3CG tip confirmation system. PICC was seen dropping into SVC with tip tracking technology and discernable peaked p waves were noted without negative deflection. A printout will be in pt's chart. PICC is cut at  41 cm and out externally 2cm. All lumens flush without resistance and draw back brisk blood return. PICC site CDI with hemostasis maintained and a biopatch applied to site. Pt instructed to stay in bed and keep arm flat and still for 30 minutes  to promote hemostasis.      Idania Aleman RN given handoff report

## 2021-10-06 NOTE — CONSULTS
Critical Care Consult Note     Patient's name:  Lisa Julio  Medical Record Number: [id number]  Patient's account/billing number: [de-identified]  Patient's YOB: 1965  Age: 60 y.o. Date of Admission: 9/21/2021 12:15 AM  Date of Consult: 10/6/2021      Primary Care Physician: Shira Perry      Code Status: Full Code    Reason for consult: acute hypoxic respiratory failure     Assessment and Plan     1. Acute respiratory failure with hypoxia  2. covid 19 PNA ARDS  3. Asthma  4. DMII  5. HTN  6. HPL  7. Leukocytosis       Plan:  Getting somnolent on bipap maximum settings, discussed with family and decision made to intubate,  Sedate, paralyze and prone   Systemic steroid  Antifungal per ID  DVT/GI Prophylaxis   CXR reviewed bilateral multifocal airspace disease. CC time 60 minutes       HISTORY OF PRESENT ILLNESS:   /Ms. Lisa Julio is a 60 y.o.  covid symptoms started 09/17, has been admitted since 09/21, worsening hypoxic failure. S/p systemic steroid, completed Remdesivir and Baricitinib  Elevated B D glucan on V fend per ID  Fungal serology negative  On maximum bipap settings with worsening wob transferred to ICU          Past Medical History:        Diagnosis Date    Asthma     Bipolar 1 disorder ([facility]     Chronic back pain     COVID-19 09/17/2021    DDD (degenerative disc disease)     Depression     Diabetes mellitus ([facility]     Fibromyalgia     Hyperlipidemia     Hypertension        Past Surgical History:        Procedure Laterality Date    CARPAL TUNNEL RELEASE      HAND SURGERY      HYSTERECTOMY         Allergies: Allergies   Allergen Reactions    Latex     Doxycycline Anaphylaxis    Flagyl [Metronidazole] Anaphylaxis    Ibuprofen Anaphylaxis    Iv Dye [Iodides] Anaphylaxis    Strawberry Extract          Home Meds:   Prior to Admission medications    Medication Sig Start Date End Date Taking?  Authorizing Provider albuterol sulfate  (90 Base) MCG/ACT inhaler Inhale 2 puffs into the lungs every 6 hours as needed for Wheezing 9/15/21   Historical Provider, MD   azelastine (ASTELIN) 0.1 % nasal spray 2 sprays by Nasal route 2 times daily 1/26/21   Historical Provider, MD   vitamin D 25 MCG (1000 UT) CAPS Take 2 capsules by mouth daily 1/26/21   Historical Provider, MD   cyanocobalamin 1000 MCG/ML injection Inject 1,000 mcg into the muscle every 7 days 7/30/21   Historical Provider, MD   Dulaglutide 1.5 MG/0.5ML SOPN Inject 1.5 mg into the skin every 7 days 11/5/20 11/5/21  Historical Provider, MD   conjugated estrogens (PREMARIN) 0.625 MG/GM vaginal cream PLACE VAGINALLY 2X PER WEEK. 1/26/21   Historical Provider, MD   ketotifen (ZADITOR) 0.025 % ophthalmic solution Apply 1 drop to eye 2 times daily 1/26/21   Historical Provider, MD   latanoprost (XALATAN) 0.005 % ophthalmic solution Apply 1 drop to eye every evening 9/15/21   Historical Provider, MD   magnesium oxide (MAG-OX) 400 MG tablet Take 400 mg by mouth 2 times daily 7/30/21   Historical Provider, MD   montelukast (SINGULAIR) 10 MG tablet Take 10 mg by mouth nightly 1/26/21   Historical Provider, MD   rizatriptan (MAXALT) 10 MG tablet Take 10 mg by mouth as needed for Migraine May repeat in 2 hours if needed 1/26/21   Historical Provider, MD   theophylline (THEODUR) 300 MG extended release tablet Take 300 mg by mouth daily 10/22/20   Historical Provider, MD   tiotropium (SPIRIVA RESPIMAT) 1.25 MCG/ACT AERS inhaler Inhale 2 puffs into the lungs daily 7/29/21   Historical Provider, MD   venlafaxine (EFFEXOR XR) 75 MG extended release capsule Take 1 capsule by mouth daily 4/6/21   Historical Provider, MD   ondansetron (ZOFRAN) 4 MG tablet Take 1 tablet by mouth 3 times daily as needed for Nausea or Vomiting 9/17/21   Omar Lund MD   carvedilol (COREG) 3.125 MG tablet Take 1 tablet by mouth daily 8/16/21   Ellis Acosta MD   sucralfate (CARAFATE) 1 GM tablet Take 1 tablet by mouth 4 times daily (with meals and nightly) 8/2/21   Maribel Bose MD   atorvastatin (LIPITOR) 10 MG tablet Take 2 tablets by mouth daily  Patient taking differently: Take 10 mg by mouth daily  7/11/20   Christopher Lee MD   mometasone-formoterol [facility]) 100-5 MCG/ACT inhaler Inhale 2 puffs into the lungs 2 times daily     Historical Provider, MD   aspirin 81 MG chewable tablet Take 81 mg by mouth daily    Historical Provider, MD   pregabalin (LYRICA) 200 MG capsule Take 200 mg by mouth 2 times daily. Historical Provider, MD   cetirizine (ZYRTEC) 10 MG tablet Take 10 mg by mouth daily    Historical Provider, MD   insulin glargine (LANTUS) 100 UNIT/ML injection vial Inject 43 Units into the skin nightly  Patient taking differently: Inject 65 Units into the skin nightly  2/6/16   Jair Maddox MD   insulin lispro (HUMALOG KWIKPEN) 100 UNIT/ML pen Inject 8 Units into the skin 3 times daily (before meals) Please dispense appropriate amount of pen needles 2/6/16   Jair Maddox MD   metFORMIN (GLUCOPHAGE) 500 MG tablet Take 2 tablets by mouth 2 times daily (with meals). 6/3/14   Salma Hamilton MD   docusate sodium (COLACE) 100 MG capsule Take 100 mg by mouth three times daily. Historical Provider, MD   EPINEPHrine (EPIPEN) 0.3 MG/0.3ML SOAJ injection Inject 0.3 mg into the muscle as needed. Use as directed for allergic reaction    Historical Provider, MD   fluticasone (FLONASE) 50 MCG/ACT nasal spray 2 sprays by Nasal route daily. Historical Provider, MD   hydrochlorothiazide (HYDRODIURIL) 25 MG tablet Take 25 mg by mouth daily. Historical Provider, MD   losartan (COZAAR) 100 MG tablet Take 100 mg by mouth daily. Historical Provider, MD   meclizine (ANTIVERT) 25 MG tablet Take 25 mg by mouth 3 times daily as needed.     Historical Provider, MD   omeprazole (PRILOSEC) 40 MG delayed release capsule Take 40 mg by mouth every morning (before breakfast)     Historical Provider, MD   polyethylene glycol (GLYCOLAX) packet Take 17 g by mouth 3 times daily. Historical Provider, MD   sodium chloride (OCEAN) 0.65 % nasal spray 2 sprays by Nasal route as needed for Congestion. Historical Provider, MD   spironolactone (ALDACTONE) 50 MG tablet Take 50 mg by mouth 2 times daily. Historical Provider, MD   traZODone (DESYREL) 50 MG tablet Take 50 mg by mouth nightly. Historical Provider, MD       Family History:       Problem Relation Age of Onset    Heart Disease Mother     Heart Disease Father          Social History:   TOBACCO:   reports that she has never smoked. She has never used smokeless tobacco.  ETOH:   reports previous alcohol use. DRUGS:  reports no history of drug use. REVIEW OF SYSTEMS:  Review of Systems -   General ROS: negative  Psychological ROS: negative  Ophthalmic ROS: negative  ENT ROS: negative  Allergy and Immunology ROS: negative  Hematological and Lymphatic ROS: negative  Endocrine ROS: negative  Breast ROS: negative  Respiratory ROS: no cough, shortness of breath, or wheezing  Cardiovascular ROS: no chest pain or dyspnea on exertion  Gastrointestinal ROS:negative  Genito-Urinary ROS: negative  Musculoskeletal ROS: negative  Neurological ROS: negative  Dermatological ROS: negative        Physical Exam:    Vitals: /66   Pulse 104   Temp 103 °F (39.4 °C) (Axillary)   Resp 24   Ht 5' 3\" (1.6 m)   Wt 193 lb 2 oz (87.6 kg)   SpO2 92%   BMI 34.21 kg/m²     Last Body weight:   Wt Readings from Last 3 Encounters:   10/05/21 193 lb 2 oz (87.6 kg)   09/17/21 186 lb 3.2 oz (84.5 kg)   08/16/21 194 lb (88 kg)       Body Mass Index : Body mass index is 34.21 kg/m². Intake and Output summary:     Intake/Output Summary (Last 24 hours) at 10/6/2021 1436  Last data filed at 10/6/2021 1243  Gross per 24 hour   Intake --   Output 1900 ml   Net -1900 ml       Physical Examination:     PHYSICAL EXAM:    Gen:  Severe distress   Eyes: PERRL.  Anicteric sclera. No conjunctival injection. ENT: No discharge. Posterior oropharynx clear. External appearance of ears and nose normal.  Neck: Trachea midline. No mass, no lymphadenopathy    Resp:  Severe increase wob and use of accessory muscles, diminished bilaterally   CV:  tachy. Regular rhythm. No murmur or rub. No edema. GI: Soft, Non-tender. Non-distended. +BS  Skin: Warm, dry, w/o erythema. Lymph: No cervical or supraclavicular LAD. M/S: No cyanosis. No clubbing. Psych: confused lethargic, no focal deficit          Laboratory findings:-    CBC:   Recent Labs     10/06/21  0420   WBC 28.4*   HGB 10.0*        BMP:    Recent Labs     10/04/21  0701 10/04/21  0701 10/05/21  0848 10/05/21  0848 10/06/21  0420      < > 137   < > 132*   K 4.9   < > 4.6  --  4.6      < > 102   < > 99   CO2 19*   < > 18*   < > 20*   BUN 34*   < > 35*   < > 25*   CREATININE 1.0  --  1.0  --  0.8   GLUCOSE 185*   < > 75   < > 145*    < > = values in this interval not displayed. S. Calcium:  Recent Labs     10/06/21  0420   CALCIUM 9.0     S. Ionized Calcium:No results for input(s): IONCA in the last 72 hours. S. Magnesium:  Recent Labs     10/06/21  0420   MG 1.70*     S. Phosphorus:  Recent Labs     10/06/21  0420   PHOS 3.8     S. Glucose:  Recent Labs     10/05/21  2025 10/06/21  0904 10/06/21  1239   POCGLU 163* 218* 263*     Glycosylated hemoglobin A1C: No results for input(s): LABA1C in the last 72 hours. INR: No results for input(s): INR in the last 72 hours. Hepatic functions:   Recent Labs     10/05/21  0848   ALKPHOS 166*   ALT 39   AST 37   PROT 6.6   BILITOT 0.6   LABALBU 3.3*     Pancreatic functions:No results for input(s): LACTA, AMYLASE in the last 72 hours. S. Lactic Acid: No results for input(s): LACTA in the last 72 hours. Cardiac enzymes:No results for input(s): CKTOTAL, CKMB, CKMBINDEX, TROPONINI in the last 72 hours. BNP:No results for input(s): BNP in the last 72 hours.   Lipid

## 2021-10-06 NOTE — PROGRESS NOTES
Hospitalist Progress Note      PCP: Татьяна Cramer    Date of Admission: 9/21/2021    Subjective: cont to be very tachypneic and fatigued, was less responsive and intubated today    Medications:  Reviewed    Infusion Medications    propofol 30 mcg/kg/min (10/06/21 1553)    HYDROmorphone 3 mg (10/06/21 1648)    cisatracurium (NIMBEX) infusion      midazolam 4 mg/hr (10/06/21 1518)    norepinephrine 70 mcg/min (10/06/21 1703)    sodium chloride      dextrose       Scheduled Medications    dexamethasone  6 mg IntraVENous Q24H    insulin lispro  0-18 Units SubCUTAneous Q4H    voriconazole  4 mg/kg (Adjusted) IntraVENous Q12H    chlorhexidine  15 mL Mouth/Throat BID    famotidine (PEPCID) injection  20 mg IntraVENous BID    lubrifresh P.M.    Both Eyes 6 times per day    sodium chloride flush  5-40 mL IntraVENous 2 times per day    losartan  50 mg Oral Daily    insulin glargine  25 Units SubCUTAneous Nightly    fluticasone  2 puff Inhalation BID    aspirin  81 mg Oral Daily    carvedilol  3.125 mg Oral Daily    fluticasone  2 spray Nasal Daily    traZODone  50 mg Oral Nightly    enoxaparin  30 mg SubCUTAneous BID    atorvastatin  10 mg Oral Daily    pregabalin  200 mg Oral BID     PRN Meds: potassium chloride, sodium phosphate IVPB **OR** sodium phosphate IVPB, sodium chloride flush, sodium chloride, sodium chloride, albuterol sulfate HFA, magnesium sulfate, ondansetron **OR** ondansetron, acetaminophen **OR** acetaminophen, glucose, dextrose, glucagon (rDNA), dextrose      Intake/Output Summary (Last 24 hours) at 10/6/2021 1703  Last data filed at 10/6/2021 1600  Gross per 24 hour   Intake --   Output 1700 ml   Net -1700 ml       Physical Exam Performed:    BP (!) 84/45   Pulse 109   Temp 100.1 °F (37.8 °C) (Axillary)   Resp 24   Ht 5' 3\" (1.6 m)   Wt 193 lb 2 oz (87.6 kg)   SpO2 93%   BMI 34.21 kg/m²     General appearance: intubated  HEENT Normal cephalic, atraumatic without obvious deformity. Pupils equal, round, and reactive to light.  Extra ocular muscles intact. Conjunctivae/corneas clear. Neck: Supple, No jugular venous distention/bruits. Trachea midline without thyromegaly or adenopathy with full range of motion. Lungs: diminished throughout, basal crepts  Heart: Regular rhythm, tachy with Normal S1/S2  Abdomen: Soft, non-tender or non-distended without rigidity or guarding and positive bowel sounds  Extremities: No clubbing, cyanosis, or edema bilaterally. Skin: Skin color, texture, turgor normal. No rashes or lesions. Neurologic: intubated, cannot be assessed  Mental status: intubated, cannot be assessed    Labs:   Recent Labs     10/04/21  0701 10/05/21  0848 10/06/21  0420   WBC 25.4* 27.6* 28.4*   HGB 10.5* 10.5* 10.0*   HCT 33.9* 33.5* 31.7*    407 379     Recent Labs     10/04/21  0701 10/05/21  0848 10/06/21  0420    137 132*   K 4.9 4.6 4.6    102 99   CO2 19* 18* 20*   BUN 34* 35* 25*   CREATININE 1.0 1.0 0.8   CALCIUM 8.9 9.1 9.0   PHOS  --   --  3.8     Recent Labs     10/04/21  0701 10/05/21  0848   AST 31 37   ALT 50* 39   BILITOT 0.5 0.6   ALKPHOS 163* 166*     No results for input(s): INR in the last 72 hours. No results for input(s): Ardelle Chalk in the last 72 hours. Urinalysis:      Lab Results   Component Value Date    NITRU Negative 09/21/2021    WBCUA 1 09/21/2021    BACTERIA 1+ 09/21/2021    RBCUA 2 09/21/2021    BLOODU Negative 09/21/2021    SPECGRAV >1.030 09/21/2021    GLUCOSEU >=1000 09/21/2021       Radiology:  XR CHEST PORTABLE   Final Result   Endotracheal tube tip is 4 cm above the stevie. Appropriate position of the nasogastric tube with side hole well beyond the   GE junction. Persistent diffuse bilateral airspace disease. XR CHEST PORTABLE   Final Result   Worsening bibasilar predominant airspace disease.          XR CHEST PORTABLE   Final Result   Interval improvement in bibasilar opacities consistent with improving   pneumonia. XR CHEST PORTABLE   Final Result   Bibasilar opacities are favored to represent pneumonia. Assessment/Plan:    Active Hospital Problems    Diagnosis     Acute respiratory distress syndrome (ARDS) due to COVID-19 virus ([facility]) [U07.1, J80]     Acute respiratory failure with hypoxia ([facility]) [J96.01]     Dyspnea [R06.00]     Pneumonia due to COVID-19 virus [U07.1, J12.82]        COVID-19 pneumonia  Decadron weaned down to 6 mg - now off  Completed Remdesivir 5 days, baricitnib day 14/14  lovenox bid for anticoagulation  Encouraged IS and proning  Elevated 1 3 beta D glucan, V fend per ID  ID consulted  Pt transferred to ICU 10/5, intubated 10/6     Acute respiratory failure with hypoxia  Titrate oxygen to keep saturations above 90%  Likely secondary to COVID-19 pneumonia  On 80-->100 fiO2 and 60L O2  Place on BIPAP-->less responsive-->intubated 10/6     Serum Beta D glucan +ve - urine histo, aspergillus Ag, fungal serologies, sputum Cx; oral Vfend per ID     Hypertension  BP soft, cont coreg, decreased losartan     Hyperlipidemia  Continue home medications     Uncontrolled diabetes mellitus type 2  Hold home medications  On Basal and ISS - Adjust PRN - decadron related   Carb controlled diet     Constipation - added miralax         Diet: ADULT DIET; Regular; 3 carb choices (45 gm/meal);  Low Sodium (2 gm)  Code Status: Full Code    PT/OT Eval Status: ordered    Carly Berman MD

## 2021-10-06 NOTE — PROGRESS NOTES
0700: Report received from Katerina Landry: Assessment completed. See charting. Patient tachycardic and tachypneic. Patient temp 103- unable to take PO medications as she is unable to come off of the BIPAP. Patient able to open eyes to voice, but does not answer yes or no questions. 1919: Patient spouse, Harjit Cruz, called to updated on patient status and discussed that the patient would more than likely require intubation. Anna Iniguez states that he does not know if the patient would want to be on the ventilator. Will call the children and discuss goals of care. Anan Joace states to hold off on intubation at this time until we hear back from him. Critical care MD notified. 1312: Decision to intubate patient. Family in hospital at this time in waiting room. 1400: Patient intubated. 7.5 @22. Patient received 10ml propofol with 50 rocoronium. 1410: Critical care NP at bedside to place arterial line. Levo started. 1430: Order to obtain a RASS of -5, paralyze, and prone patient. 1450: OG inserted. CXR ordered stat for intubation/OG placement. 1710: Patient RASS-5.     6286: Patient proned at this time. Heart rate dropped from 110 to 60, BP dropped to a MAP of 33. Levo increased to 100mcg/min. 1758: Unable to get MAP >65. Currently at [address] [place] [address], critical care NP, notified. Vaso 0.04 ordered. 1830: Patient blood pressure continues to drop epi and roland ordered. 1834: Order to flip patient back on her back. Stat CXR. Will call family. 1839: Patient unproned. No pulse once hooked back up to arterial line. CPR initiated. 1mg EPI given. 1841: Patient with [address]: Roland and epi started. See charting. 1856: Family called and updated. 1900: Spoke with Critical care NPBecky. Stat ABG ordered. 1915: Patient ABG sent. Awaiting results. 1931: Report given to Roberto Cole RN.      Electronically signed by Reilly Coreas RN on 10/6/2021 at 7:31 PM

## 2021-10-06 NOTE — FLOWSHEET NOTE
10/06/21 1402   Encounter Summary   Services provided to: Family   Referral/Consult From: Nurse   Support System Spouse; Children;Family members   Continue Visiting   (support to pt's family 10/6 CL)   Complexity of Encounter Moderate   Length of Encounter 2 hours   Grief and Life Adjustment   Type Adjustment to illness   Assessment Approachable;Tearful; Anxious   Intervention Active listening;Explored feelings, thoughts, concerns;Sustaining presence/ Ministry of presence; Discussed illness/injury and it's impact  (support at family meeting)   Outcome Expressed gratitude;Engaged in conversation;Coping; Hopeful   Electronically signed by Olman Leyva on 10/6/2021 at 2:07 PM

## 2021-10-06 NOTE — PLAN OF CARE
Problem: Airway Clearance - Ineffective  Goal: Achieve or maintain patent airway  Outcome: Ongoing     Problem: Gas Exchange - Impaired  Goal: Absence of hypoxia  Outcome: Ongoing  Goal: Promote optimal lung function  Outcome: Ongoing     Problem: Breathing Pattern - Ineffective  Goal: Ability to achieve and maintain a regular respiratory rate  Outcome: Ongoing     Problem:  Body Temperature -  Risk of, Imbalanced  Goal: Ability to maintain a body temperature within defined limits  Outcome: Ongoing  Goal: Will regain or maintain usual level of consciousness  Outcome: Ongoing  Goal: Complications related to the disease process, condition or treatment will be avoided or minimized  Outcome: Ongoing     Problem: Isolation Precautions - Risk of Spread of Infection  Goal: Prevent transmission of infection  Outcome: Ongoing     Problem: Nutrition Deficits  Goal: Optimize nutritional status  Outcome: Ongoing     Problem: Risk for Fluid Volume Deficit  Goal: Maintain normal heart rhythm  Outcome: Ongoing  Goal: Maintain absence of muscle cramping  Outcome: Ongoing  Goal: Maintain normal serum potassium, sodium, calcium, phosphorus, and pH  Outcome: Ongoing     Problem: Loneliness or Risk for Loneliness  Goal: Demonstrate positive use of time alone when socialization is not possible  Outcome: Ongoing     Problem: Fatigue  Goal: Verbalize increase energy and improved vitality  Outcome: Ongoing     Problem: Patient Education: Go to Patient Education Activity  Goal: Patient/Family Education  Outcome: Ongoing     Problem: Pain:  Goal: Pain level will decrease  Description: Pain level will decrease  Outcome: Ongoing  Goal: Control of acute pain  Description: Control of acute pain  Outcome: Ongoing  Goal: Control of chronic pain  Description: Control of chronic pain  Outcome: Ongoing  Goal: Patient's pain/discomfort is manageable  Description: Patient's pain/discomfort is manageable  Outcome: Ongoing     Problem: Infection:  Goal: Will remain free from infection  Description: Will remain free from infection  Outcome: Ongoing     Problem: Safety:  Goal: Free from accidental physical injury  Description: Free from accidental physical injury  Outcome: Ongoing  Goal: Free from intentional harm  Description: Free from intentional harm  Outcome: Ongoing     Problem: Daily Care:  Goal: Daily care needs are met  Description: Daily care needs are met  Outcome: Ongoing     Problem: Skin Integrity:  Goal: Skin integrity will stabilize  Description: Skin integrity will stabilize  Outcome: Ongoing     Problem: Discharge Planning:  Goal: Patients continuum of care needs are met  Description: Patients continuum of care needs are met  Outcome: Ongoing

## 2021-10-07 NOTE — PROGRESS NOTES
Critical Care Progress note Note     Patient's name:  Rolando Shane  Medical Record Number: [id number]  Patient's account/billing number: [de-identified]  Patient's YOB: 1965  Age: 60 y.o. Date of Admission: 9/21/2021 12:15 AM  Date of Consult: 10/7/2021      Primary Care Physician: Alyssa Leigh      Code Status: Full Code    Reason for consult: acute hypoxic respiratory failure     Assessment and Plan     1. Acute respiratory failure with hypoxia  2. covid 19 PNA ARDS  3. S/p Cardiac arrest respiratory triggered  4. ARF oliguric with metabolic acidosis  5. DMII  6. HTN  7. HPL      Plan:  Continue current vent settings on AC/PC, recheck ABG  Bicarb gtt, lasix IV, nephrology on board   Wean pressors off as tolerated keep MAP> 65, currently on Levoph, Neosyn and Vasopressin   BS control insulin gtt  Systemic steroid  DVT prophylaxis   Overall guarded prognosis   CC time 45 min     Subjective:  Coded yesterday after proning received CPR with Jess  Currently on AC/PC    REVIEW OF SYSTEMS:  Unable to obtain on the vent         Physical Exam:    Vitals: BP (!) 127/58   Pulse 68   Temp 97.8 °F (36.6 °C)   Resp 30   Ht 5' 3\" (1.6 m)   Wt 190 lb 0.6 oz (86.2 kg)   SpO2 98%   BMI 33.66 kg/m²     Last Body weight:   Wt Readings from Last 3 Encounters:   10/07/21 190 lb 0.6 oz (86.2 kg)   09/17/21 186 lb 3.2 oz (84.5 kg)   08/16/21 194 lb (88 kg)       Body Mass Index : Body mass index is 33.66 kg/m². Intake and Output summary:     Intake/Output Summary (Last 24 hours) at 10/7/2021 1128  Last data filed at 10/7/2021 1111  Gross per 24 hour   Intake 2466.5 ml   Output 930 ml   Net 1536.5 ml       Physical Examination:     PHYSICAL EXAM:    Gen:  Sedated on MV  Eyes: PERRL. Anicteric sclera. No conjunctival injection. ENT: No discharge. Posterior oropharynx clear. External appearance of ears and nose normal.  Neck: Trachea midline.  No mass, no found for: [facility]       Radiology Review:  Pertinent images / reports were reviewed as a part of this visit. CT Chest w/ contrast: No results found for this or any previous visit. CT Chest w/o contrast: No results found for this or any previous visit. CTPA: No results found for this or any previous visit. CXR PA/LAT: Results for orders placed during the hospital encounter of 08/02/21    XR CHEST (2 VW)    Narrative  EXAM: CHEST 2 VIEWS X-RAY    INDICATION: Epigastric pain    COMPARISON: CT abdomen and pelvis 8/2/2021    FINDINGS:    HEART / MEDIASTINUM: Within normal limits. LUNGS/PLEURA: No dense focal consolidation, pulmonary venous congestion, pleural effusion or pneumothorax. BONES / SOFT TISSUES: No acute abnormality. OTHER: None. Impression  No evidence for acute cardiopulmonary disease. There is no consolidation or other detectable airspace disease on plain radiographs. CXR portable: Results for orders placed during the hospital encounter of 09/21/21    XR CHEST PORTABLE    Narrative  EXAMINATION:  ONE XRAY VIEW OF THE CHEST    10/5/2021 5:50 pm    COMPARISON:  September 29, 2021    HISTORY:  ORDERING SYSTEM PROVIDED HISTORY: worsening hypoxic resp failure  TECHNOLOGIST PROVIDED HISTORY:  Reason for exam:->worsening hypoxic resp failure    FINDINGS:  Cardiomediastinal silhouette appears unchanged. Worsening bibasilar  predominant airspace disease. No definite effusion. No pneumothorax or  subdiaphragmatic free air. Impression  Worsening bibasilar predominant airspace disease.                      Ole Parikh MD, M.D.            10/7/2021, 11:28 AM

## 2021-10-07 NOTE — PROGRESS NOTES
24 hours) at 10/7/2021 1653  Last data filed at 10/7/2021 1500  Gross per 24 hour   Intake 4125.41 ml   Output 460 ml   Net 3665.41 ml       Physical Exam Performed:    BP (!) 96/53   Pulse 70   Temp 97.7 °F (36.5 °C)   Resp 30   Ht 5' 3\" (1.6 m)   Wt 190 lb 0.6 oz (86.2 kg)   SpO2 91%   BMI 33.66 kg/m²     General appearance: intubated  HEENT Normal cephalic, atraumatic without obvious deformity. Pupils equal, round, and reactive to light.  Extra ocular muscles intact. Conjunctivae/corneas clear. Neck: Supple, No jugular venous distention/bruits. Trachea midline without thyromegaly or adenopathy with full range of motion. Lungs: diminished throughout, basal crepts  Heart: Regular rhythm, tachy with Normal S1/S2  Abdomen: Soft, non-tender or non-distended without rigidity or guarding and positive bowel sounds  Extremities: No clubbing, cyanosis, or edema bilaterally. Skin: Skin color, texture, turgor normal. No rashes or lesions. Neurologic: intubated, cannot be assessed  Mental status: intubated, cannot be assessed    Labs:   Recent Labs     10/05/21  0848 10/06/21  0420 10/07/21  0400   WBC 27.6* 28.4* 19.3*   HGB 10.5* 10.0* 8.9*   HCT 33.5* 31.7* 27.9*    379 273     Recent Labs     10/06/21  0420 10/07/21  0400 10/07/21  0949   * 125* 130*   K 4.6 6.3* 5.0   CL 99 89* 94*   CO2 20* 15* 17*   BUN 25* 50* 53*   CREATININE 0.8 2.4* 2.4*   CALCIUM 9.0 8.1* 7.8*   PHOS 3.8 9.2*  --      Recent Labs     10/05/21  0848 10/07/21  0400   AST 37 4,646*   ALT 39 2,151*   BILITOT 0.6 1.4*   ALKPHOS 166* 218*     No results for input(s): INR in the last 72 hours. No results for input(s): Ursula Haxt[place] in the last 72 hours.     Urinalysis:      Lab Results   Component Value Date    NITRU Negative 09/21/2021    WBCUA 1 09/21/2021    BACTERIA 1+ 09/21/2021    RBCUA 2 09/21/2021    BLOODU Negative 09/21/2021    SPECGRAV >1.030 09/21/2021    GLUCOSEU >=1000 09/21/2021       Radiology:  XR CHEST PORTABLE   Final Result   Stable bilateral perihilar and bibasilar ill-defined consolidation consistent   with alveolar edema versus pneumonia plus or minus mild bilateral pleural   effusions. Mild-to-moderate cardiomegaly. XR CHEST PORTABLE   Final Result   Supportive tubing projects in normal position. Vascular congestion. Bibasilar airspace disease, pulmonary edema versus atelectasis or pneumonia. Possible small pleural effusions. XR CHEST PORTABLE   Final Result   Endotracheal tube tip is 4 cm above the stevie. Appropriate position of the nasogastric tube with side hole well beyond the   GE junction. Persistent diffuse bilateral airspace disease. XR CHEST PORTABLE   Final Result   Worsening bibasilar predominant airspace disease. XR CHEST PORTABLE   Final Result   Interval improvement in bibasilar opacities consistent with improving   pneumonia. XR CHEST PORTABLE   Final Result   Bibasilar opacities are favored to represent pneumonia.                  Assessment/Plan:    Active Hospital Problems    Diagnosis     COVID-19 virus infection [U07.1]     Septic shock ([facility] [address] [A41.9, R65.21]     Acute renal failure with acute cortical necrosis (HCC) [N17.1]     Acute respiratory distress syndrome (ARDS) due to COVID-19 virus (HCC) [U07.1, J80]     Acute respiratory failure with hypoxia (HCC) [J96.01]     Dyspnea [R06.00]     Pneumonia due to COVID-19 virus [U07.1, J12.82]             COVID-19 pneumonia/ARDS  Decadron weaned down to 6 mg --> was off-->now on 6mg  Completed Remdesivir 5 days, baricitnib day 14/14  lovenox bid for anticoagulation  Encouraged IS and proning  Elevated 1 3 beta D glucan, V fend per ID  ID consulted  Pt transferred to ICU 10/5, intubated 10/6  10/6 - Tried to paralyze and prone - but pt not tolerated     Acute respiratory failure with hypoxia  Titrate oxygen to keep saturations above 90%  Likely secondary to COVID-19

## 2021-10-07 NOTE — CONSULTS
Nephrology ([facility]) Consult Note    Radha Li is a 60 y.o. female whom we were asked to see for OLEGARIO. The patient presented with Covid-19 symptoms, which started on 09/17 and was admitted on 09/21 with worsening hypoxia. Her respiratory status continued to deteriorate despite bipap and was transferred to the ICU on 10/06/21. She had a prolonged code yesterday right before change of shift. 10/06/21 Cr 0.8  coded  10/07/21 Cr 2.4      Past Medical History:  DM2  HTN  HLP  obe    Review of System:  Otherwise unremarkable    Allergies:  Latex, Doxycycline, Flagyl [metronidazole], Ibuprofen, Iv dye [iodides], and Strawberry extract    Medications:  Current medications reviewed. Social History:  no tobacco  Family Medical History:  Negative for kidney disase    Physical Exam:  Blood pressure 118/61, pulse 68, temperature 97.8 °F (36.6 °C), temperature source Axillary, resp. rate 30, height 5' 3\" (1.6 m), weight 190 lb 0.6 oz (86.2 kg), SpO2 98 %, not currently breastfeeding.     General:  NAD, ventilated, ill-appearing, obese body habitus  HEENT:  pupils fixed  Neck:  Supple, normal range of movement  Chest: coarse BS, good air movement  CV:  RRR, no murmurs or rubs, no carotid bruit, no abdominal bruits  Abdomen:  NTND, soft, no BS, no hepatosplenomegaly  Extremities:  No peripheral edema  Neurological:  N/A  Lymphatics:  No palpable lymph nodes  Skin:  No rash, no jaundice  Psychiatric:  N/A    Laboratory Studies:  Lab Results   Component Value Date/Time     (L) 10/07/2021 09:49 AM    K 5.0 10/07/2021 09:49 AM    K 4.6 10/06/2021 04:20 AM    CL 94 (L) 10/07/2021 09:49 AM    CO2 17 (L) 10/07/2021 09:49 AM    BUN 53 (H) 10/07/2021 09:49 AM    CREATININE 2.4 (H) 10/07/2021 09:49 AM    CALCIUM 7.8 (L) 10/07/2021 09:49 AM    PHOS 9.2 (H) 10/07/2021 04:00 AM    MG 2.70 (H) 10/07/2021 04:00 AM     Lab Results   Component Value Date/Time    WBC 19.3 (H) 10/07/2021 04:00 AM    HGB 8.9 (L) 10/07/2021 04:00 AM    HCT 27.9 (L) 10/07/2021 04:00 AM     10/07/2021 04:00 AM       Assessment/Plan:  Reviewed old records and labs.     1) OLEGARIO   - ATN +/- Covid-19   - check urine studies   - I suspect her renal function will worsen   - given poor prognosis, we will only consider her a candidate for HD, if necessary    2) ventilated   - per pulmonary    3) Covid-19   - per pulmonary, hospitalist, and ID    4) FEN   - hyperkalemia    - on kayexalate   - metabolic acidosis    - monitor   - hyperphosphatemia    - not much to be done right now   - hyponatremia    - monitor    5) ID   - on vfend    critical care:  30+ min

## 2021-10-07 NOTE — PROGRESS NOTES
Changed pt to AC/PC rate of 30/ insp pressure of 27/ peep of 10 and 100% due to peak pressures being over 40 on VC+. Peak pressures are now 38 and plateau pressures are 36.  Critical care aware

## 2021-10-07 NOTE — PROGRESS NOTES
Messaged Dr. Erika Betancourt to let him know that pt was given 18 units of sliding-scale insulin and 10 units of Humulin R at 0158am (see MAR). Blood sugar was rechecked - 558mg/dL. 5593: Orders received from Dr. Erika Betancourt to start pt on DKA protocol and draw 6am labs early.

## 2021-10-07 NOTE — PROGRESS NOTES
Pt's spouse, Belle Mendez, called for updates. All questions answered at this time. He asked to be called with any significant updates.

## 2021-10-07 NOTE — PROGRESS NOTES
Pt's blood sugar elevated in the 500s despite the use of lantus and sliding scale insulin. New order received from Dr Jaelyn Magana for 10 units, will re-check glucose in one hour.     Electronically signed by Batool White RN on 10/7/2021

## 2021-10-07 NOTE — PROGRESS NOTES
Pt's spouse, Misael Tyson, called for updates. All questions answered at this time. He asked to be called with any significant updates.

## 2021-10-07 NOTE — PROGRESS NOTES
Call received from Dr Kristin Kennedy, updated on pt's abg results, vent settings, and chest xray results. RT at bedside to adjust settings per new orders received.  Repeat ABG in AM.     Electronically signed by Anna Cross RN on 10/6/2021

## 2021-10-07 NOTE — PROGRESS NOTES
Infectious Disease Follow up Notes  Admit Date: 9/21/2021  Hospital Day: 17    Antibiotics :   IV Dexamethasone   IV Cefepime     CHIEF COMPLAINT:     COVID 19 PNA  ARDS  DM+   Positive Beta D glucan  Septic shock  CODE BLUE      Subjective interval History :  60 y.o. who has a past medical history of bipolar disorder, chronic back pain, depression, diabetes, hypertension admitted to the hospital secondary to shortness of breath and cough. She works at [employer] she thinks metal exposed at the last visit and tested positive for COVID-19. Was found to be hypoxic at 75% on room air on admission. Also blood glucose elevated at 1 300s. Chest x-ray consistent with COVID-19 pneumonia with bilateral opacities. On admission WBC count normal with ongoing lymphocytopenia secondary to COVID-19 infection. While on dexamethasone her WBC count has increased. She is required heated high flow secondary on ongoing hypoxemia. Serum Beta D glucan +Ve on serological testing and we are consulted for recommendations.       Interval History :Events noted tx to ICU for resp failure now intubated on the vent on multiple pressor support suffered cardiac arrest and coded notes reviewed now in shock liver LFT elevation noted with OLEGARIO     Past Medical History:    Past Medical History:   Diagnosis Date    Asthma     Bipolar 1 disorder ([facility] [address]     Chronic back pain     COVID-19 09/17/2021    DDD (degenerative disc disease)     Depression     Diabetes mellitus ([address]     Fibromyalgia     Hyperlipidemia     Hypertension        Past Surgical History:    Past Surgical History:   Procedure Laterality Date    CARPAL TUNNEL RELEASE      HAND SURGERY      HYSTERECTOMY         Current Medications:    No outpatient medications have been marked as taking for the 9/21/21 encounter [facility] Encounter).        Allergies:  Latex, Doxycycline, Flagyl [metronidazole], Ibuprofen, Iv dye [iodides], and Strawberry extract    Immunizations :   Immunization History   Administered Date(s) Administered    Pneumococcal Polysaccharide ([id number]) 06/02/2014       Social History:    Social History     Tobacco Use    Smoking status: Never Smoker    Smokeless tobacco: Never Used   Vaping Use    Vaping Use: Never used   Substance Use Topics    Alcohol use: Not Currently     Comment: 1-2 times a month    Drug use: No     Social History     Tobacco Use   Smoking Status Never Smoker   Smokeless Tobacco Never Used      Family History   Problem Relation Age of Onset    Heart Disease Mother     Heart Disease Father          REVIEW OF SYSTEMS:     Not possible due to ventilation                PHYSICAL EXAM:      Vitals:    /61   Pulse 69   Temp 97.7 °F (36.5 °C) (Axillary)   Resp 30   Ht 5' 3\" (1.6 m)   Wt 190 lb 0.6 oz (86.2 kg)   SpO2 95%   BMI 33.66 kg/m²       General Appearance: intubated sedated on the vent in some distress++, ++ pallor, no icterus   Skin: warm and dry, no rash or erythema  Head: normocephalic and atraumatic  Eyes: pupils equal, round, and reactive to light, conjunctivae normal  ENT: tympanic membrane, external ear and ear canal normal bilaterally, nose without deformity, nasal mucosa and turbinates normal without polyps  Neck: supple and non-tender without mass, no thyromegaly  no cervical lymphadenopathy  Pulmonary/Chest: Bi basal crepts++  no wheezes, rales or rhonchi, normal air movement, in  respiratory distress  Cardiovascular:  S1 and S2, no murmurs, rubs, clicks, or gallops, no carotid bruits  Abdomen: soft, non-tender, non-distended, normal bowel sounds, no masses or organomegaly  Extremities: no cyanosis, clubbing or edema  Musculoskeletal: normal range of motion, no joint swelling, deformity or tenderness  Integumentary: No rashes, no abnormal skin lesions, no petechiae  Neurologic: reflexes normal and symmetric, no cranial nerve deficit Lines: PICC  Baca+      Data Review:    CBC:   Lab Results   Component Value Date    WBC 19.3 (H) 10/07/2021    HGB 8.9 (L) 10/07/2021    HCT 27.9 (L) 10/07/2021    MCV 94.4 10/07/2021     10/07/2021     RENAL:   Lab Results   Component Value Date    CREATININE 2.4 (H) 10/07/2021    BUN 53 (H) 10/07/2021     (L) 10/07/2021    K 5.0 10/07/2021    CL 94 (L) 10/07/2021    CO2 17 (L) 10/07/2021     SED RATE: No results found for: SEDRATE  CK: No results found for: CKTOTAL  CRP: No results found for: CRP  Hepatic Function Panel:   Lab Results   Component Value Date    ALKPHOS 218 10/07/2021    ALT 2,151 10/07/2021    AST 4,646 10/07/2021    PROT 6.3 10/07/2021    BILITOT 1.4 10/07/2021    BILIDIR <0.2 02/05/2016    IBILI see below 02/05/2016    LABALBU 2.9 10/07/2021     UA:  Lab Results   Component Value Date    COLORU YELLOW 09/21/2021    CLARITYU Clear 09/21/2021    GLUCOSEU >=1000 09/21/2021    BILIRUBINUR Negative 09/21/2021    KETUA 15 09/21/2021    SPECGRAV >1.030 09/21/2021    BLOODU Negative 09/21/2021    PHUR 5.5 09/21/2021    PROTEINU 100 09/21/2021    UROBILINOGEN 0.2 09/21/2021    NITRU Negative 09/21/2021    LEUKOCYTESUR Negative 09/21/2021    LABMICR YES 09/21/2021    URINETYPE NotGiven 09/21/2021      Urine Microscopic:   Lab Results   Component Value Date    BACTERIA 1+ 09/21/2021    COMU see below 09/21/2021    HYALCAST 3 09/21/2021    WBCUA 1 09/21/2021    RBCUA 2 09/21/2021    EPIU 3 09/21/2021     Urine Reflex to Culture:   Lab Results   Component Value Date    URRFLXCULT Not Indicated 09/21/2021 9/29/2021  2:19 PM - Mikhail Britt Incoming Lab Results From Soft (Epic Adt)     Component Value Ref Range & Units Status Collected Lab   (1,3)-Beta-D-Glucan (Fungitell) Interpretation PositiveAbnormal   Negative Final 09/27/2021  1:07 PM [facility]   INTERPRETIVE INFORMATION: (1,3)-beta-D-glucan (Fungitell)     Less than 31 pg/mL . ..................  Negative     31-59 pg/mL Alisson Red Alisson Red Alisson Red ....................... Negative     60-79 pg/mL . ......................... Indeterminate     Greater than or equal to 80 pg/mL . ... Positive   The Fungitell test is indicated for presumptive diagnosis   of fungal infection and should be used in conjunction with   other diagnostic procedures. This test does not detect   certain fungal species such as Cryptococcus, which produce   very low levels of (1,3)-beta-D-glucan. This test will not   detect the zygomycetes, such as Absidia, Mucor, and   Rhizopus, which are not known to produce   (1,3)-beta-D-glucan. In addition, the yeast phase of      Procal 4.83     ALT  2151   AST  4646     CREAT  2.4     MICRO: cultures reviewed and updated by me   Blood Culture: No results found for: [facility], BLOODCULT2    Respiratory Culture:  Lab Results   Component Value Date    CULTRESP Normal respiratory mariza 09/30/2021    LABGRAM  09/30/2021     2+ WBC's (Polymorphonuclear)  2+ Epithelial Cells  3+ Gram positive cocci  3+ Gram positive rods  2+ Gram negative rods       AFB:No results found for: AFBSMEAR  Viral Culture:  Lab Results   Component Value Date    COVID19 Detected 09/17/2021     Urine Culture: No results for input(s): Luis Fernando Craig in the last 72 hours. IMAGING:    XR CHEST PORTABLE   Final Result   Stable bilateral perihilar and bibasilar ill-defined consolidation consistent   with alveolar edema versus pneumonia plus or minus mild bilateral pleural   effusions. Mild-to-moderate cardiomegaly. XR CHEST PORTABLE   Final Result   Supportive tubing projects in normal position. Vascular congestion. Bibasilar airspace disease, pulmonary edema versus atelectasis or pneumonia. Possible small pleural effusions. XR CHEST PORTABLE   Final Result   Endotracheal tube tip is 4 cm above the stevie. Appropriate position of the nasogastric tube with side hole well beyond the   GE junction. Persistent diffuse bilateral airspace disease. sulfate, ondansetron **OR** ondansetron, acetaminophen **OR** acetaminophen, glucose, dextrose, glucagon (rDNA), dextrose      Assessment:     Patient Active Problem List   Diagnosis    Syncope and collapse    Community acquired bacterial pneumonia    Abnormal cardiovascular stress test    Essential hypertension    Mixed hyperlipidemia    Pneumonia due to COVID-19 virus    Acute respiratory distress syndrome (ARDS) due to COVID-19 virus ([facility] [address]    Acute respiratory failure with hypoxia (HCC)    Dyspnea    COVID-19 virus infection    Septic shock ([facility] [address]    Acute renal failure with acute cortical necrosis (HCC)     COVID 19 PNA  Acute Hypoxic resp failure   DM poor control   Depression  Bi polar  Obesity BMI at 35  CXR with Multifocal PNA  Serum Beta D glucan +Ve  SEPTIC SHOCK  Cardiac arrest  Now on the Ventilator  Shock liver  OLEGARIO+         She remains critically ill in ICU from shock and organ failure on high flow Oxygen on the vent and on 3 pressors with OLEGARIO    Given the resp failure and prolonged hospital stay will need IV abx coverage for shock     Urine antigen testing  -ve Aspergillus antigen -ve Fungal antibodies if negative  Beta d GLUCAN repeat pending given shock liver Holding V fend for today    Will be able to use oral V fend once Lfts better and cannot use - IV formulation due to OLEGARIO     Labs, Microbiology, Radiology and all the pertinent results from current hospitalization and  care every where were reviewed  by me as a part of the evaluation   Plan:   1. On low dose IV Dexamethasone per ICU team   2. Completed Barcitinib   3. Add IV Cefepime x 1 gm q 12 hrs  4. Fungal serologies  -ve    5. Urine Histo antigen  -ve    6. Repeat Trach aspirate  7. Holding V fend until lfts improve  8. Control DM   9. Repeat Beta D glucan results pending   10.  Remains critically ill       · Discussed with patient/Family and Nursing staff  D/w pharmacy      · Patient is critically ill and has a potentially life threatening infection that poses threat to life/bodily function. · There is potential for worsening infection/ sudden clinically significant or life-threatening deterioration in the patient's condition without appropriate antimicrobial therapy. · Complex medical decision making process was involved to select appropriate antimicrobial agents to reverse the cause of patient's severe infection/ illness. · Antimicrobial therapy requires intensive monitoring for toxicity and frequent dose adjustments to prevent toxicity and permanent end-organ dysfunction    CC time : 32 min   Thanks for allowing me to participate in your patient's care and please call me with any questions or concerns.     Kinjal Rivas MD  Infectious Disease  [facility]) Physician  Phone: [phone]   Fax : [phone]

## 2021-10-07 NOTE — PLAN OF CARE
Problem: Non-Violent Restraints  Goal: Removal from restraints as soon as assessed to be safe  Outcome: Met This Shift  Goal: No harm/injury to patient while restraints in use  Outcome: Met This Shift  Goal: Patient's dignity will be maintained  Outcome: Met This Shift     Problem: Airway Clearance - Ineffective  Goal: Achieve or maintain patent airway  Outcome: Ongoing     Problem: Gas Exchange - Impaired  Goal: Absence of hypoxia  Outcome: Ongoing  Goal: Promote optimal lung function  Outcome: Ongoing     Problem: Breathing Pattern - Ineffective  Goal: Ability to achieve and maintain a regular respiratory rate  Outcome: Ongoing     Problem:  Body Temperature -  Risk of, Imbalanced  Goal: Ability to maintain a body temperature within defined limits  Outcome: Ongoing  Goal: Will regain or maintain usual level of consciousness  Outcome: Ongoing  Goal: Complications related to the disease process, condition or treatment will be avoided or minimized  Outcome: Ongoing     Problem: Isolation Precautions - Risk of Spread of Infection  Goal: Prevent transmission of infection  Outcome: Ongoing     Problem: Nutrition Deficits  Goal: Optimize nutritional status  Outcome: Ongoing     Problem: Risk for Fluid Volume Deficit  Goal: Maintain normal heart rhythm  Outcome: Ongoing  Goal: Maintain absence of muscle cramping  Outcome: Ongoing  Goal: Maintain normal serum potassium, sodium, calcium, phosphorus, and pH  Outcome: Ongoing     Problem: Loneliness or Risk for Loneliness  Goal: Demonstrate positive use of time alone when socialization is not possible  Outcome: Ongoing     Problem: Fatigue  Goal: Verbalize increase energy and improved vitality  Outcome: Ongoing     Problem: Patient Education: Go to Patient Education Activity  Goal: Patient/Family Education  Outcome: Ongoing     Problem: Pain:  Goal: Pain level will decrease  Description: Pain level will decrease  Outcome: Ongoing  Goal: Control of acute pain  Description: Control of acute pain  Outcome: Ongoing  Goal: Control of chronic pain  Description: Control of chronic pain  Outcome: Ongoing  Goal: Patient's pain/discomfort is manageable  Description: Patient's pain/discomfort is manageable  Outcome: Ongoing     Problem: Infection:  Goal: Will remain free from infection  Description: Will remain free from infection  Outcome: Ongoing     Problem: Safety:  Goal: Free from accidental physical injury  Description: Free from accidental physical injury  Outcome: Ongoing  Goal: Free from intentional harm  Description: Free from intentional harm  Outcome: Ongoing     Problem: Daily Care:  Goal: Daily care needs are met  Description: Daily care needs are met  Outcome: Ongoing     Problem: Skin Integrity:  Goal: Skin integrity will stabilize  Description: Skin integrity will stabilize  Outcome: Ongoing     Problem: Discharge Planning:  Goal: Patients continuum of care needs are met  Description: Patients continuum of care needs are met  Outcome: Ongoing     Problem: Skin Integrity:  Goal: Will show no infection signs and symptoms  Description: Will show no infection signs and symptoms  Outcome: Ongoing  Goal: Absence of new skin breakdown  Description: Absence of new skin breakdown  Outcome: Ongoing

## 2021-10-07 NOTE — PROGRESS NOTES
Call received from Dr Janelle Akbar with nephrology. New orders received for x1 amp bicarb and 80mg lasix.     Electronically signed by Toshia Zuniga RN on 10/7/2021

## 2021-10-07 NOTE — PLAN OF CARE
Problem: Airway Clearance - Ineffective  Goal: Achieve or maintain patent airway  Outcome: Ongoing     Problem: Gas Exchange - Impaired  Goal: Absence of hypoxia  Outcome: Ongoing  Goal: Promote optimal lung function  Outcome: Ongoing     Problem: Breathing Pattern - Ineffective  Goal: Ability to achieve and maintain a regular respiratory rate  Outcome: Ongoing     Problem:  Body Temperature -  Risk of, Imbalanced  Goal: Ability to maintain a body temperature within defined limits  Outcome: Ongoing  Goal: Will regain or maintain usual level of consciousness  Outcome: Ongoing  Goal: Complications related to the disease process, condition or treatment will be avoided or minimized  Outcome: Ongoing     Problem: Isolation Precautions - Risk of Spread of Infection  Goal: Prevent transmission of infection  Outcome: Ongoing     Problem: Nutrition Deficits  Goal: Optimize nutritional status  Outcome: Ongoing     Problem: Risk for Fluid Volume Deficit  Goal: Maintain normal heart rhythm  Outcome: Ongoing  Goal: Maintain absence of muscle cramping  Outcome: Ongoing  Goal: Maintain normal serum potassium, sodium, calcium, phosphorus, and pH  Outcome: Ongoing     Problem: Loneliness or Risk for Loneliness  Goal: Demonstrate positive use of time alone when socialization is not possible  Outcome: Ongoing     Problem: Fatigue  Goal: Verbalize increase energy and improved vitality  Outcome: Ongoing     Problem: Patient Education: Go to Patient Education Activity  Goal: Patient/Family Education  Outcome: Ongoing     Problem: Pain:  Goal: Pain level will decrease  Description: Pain level will decrease  Outcome: Ongoing  Goal: Control of acute pain  Description: Control of acute pain  Outcome: Ongoing  Goal: Control of chronic pain  Description: Control of chronic pain  Outcome: Ongoing  Goal: Patient's pain/discomfort is manageable  Description: Patient's pain/discomfort is manageable  Outcome: Ongoing     Problem: Infection:  Goal: Will remain free from infection  Description: Will remain free from infection  Outcome: Ongoing     Problem: Safety:  Goal: Free from accidental physical injury  Description: Free from accidental physical injury  Outcome: Ongoing  Goal: Free from intentional harm  Description: Free from intentional harm  Outcome: Ongoing     Problem: Daily Care:  Goal: Daily care needs are met  Description: Daily care needs are met  Outcome: Ongoing     Problem: Skin Integrity:  Goal: Skin integrity will stabilize  Description: Skin integrity will stabilize  Outcome: Ongoing     Problem: Discharge Planning:  Goal: Patients continuum of care needs are met  Description: Patients continuum of care needs are met  Outcome: Ongoing     Problem: Non-Violent Restraints  Goal: Removal from restraints as soon as assessed to be safe  Outcome: Ongoing  Goal: No harm/injury to patient while restraints in use  Outcome: Ongoing  Goal: Patient's dignity will be maintained  Outcome: Ongoing

## 2021-10-08 NOTE — PROGRESS NOTES
conjunctival injection. ENT: No discharge. Posterior oropharynx clear. External appearance of ears and nose normal.  Neck: Trachea midline. No mass, no lymphadenopathy    Resp:  Diminished with scattered rhonchi no wheezing   CV:  tachy. Regular rhythm. No murmur or rub. No edema. GI: Soft, Non-tender. Non-distended. +BS  Skin: Warm, dry, w/o erythema. Lymph: No cervical or supraclavicular LAD. M/S: No cyanosis. No clubbing. Psych: sedated on MV        Laboratory findings:-    CBC:   Recent Labs     10/08/21  0527 10/08/21  0527 10/08/21  0646   WBC 14.4*  --   --    HGB 6.9*   < > 6.7*     --   --     < > = values in this interval not displayed. BMP:    Recent Labs     10/07/21  0400 10/07/21  0400 10/07/21  0949 10/07/21  0949 10/08/21  0527   *   < > 130*   < > 133*   K 6.3*   < > 5.0   < > 4.9   CL 89*   < > 94*   < > 93*   CO2 15*   < > 17*   < > 19*   BUN 50*   < > 53*   < > 64*   CREATININE 2.4*  --  2.4*  --  3.6*   GLUCOSE 564*   < > 368*   < > 130*    < > = values in this interval not displayed. S. Calcium:  Recent Labs     10/08/21  0527   CALCIUM 8.0*     S. Ionized Calcium:No results for input(s): IONCA in the last 72 hours. S. Magnesium:  Recent Labs     10/08/21  0527   MG 2.40     S. Phosphorus:  Recent Labs     10/08/21  0527   PHOS 9.8*     S. Glucose:  Recent Labs     10/08/21  0523 10/08/21  0626 10/08/21  0746   POCGLU 148* 145* 138*     Glycosylated hemoglobin A1C: No results for input(s): LABA1C in the last 72 hours. INR: No results for input(s): INR in the last 72 hours. Hepatic functions:   Recent Labs     10/08/21  0527   ALKPHOS 149*   ALT 1,322*   AST 1,608*   PROT 5.6*   BILITOT 1.7*   LABALBU 2.5*     Pancreatic functions:No results for input(s): LACTA, AMYLASE in the last 72 hours. S. Lactic Acid: No results for input(s): LACTA in the last 72 hours.   Cardiac enzymes:No results for input(s): CKTOTAL, CKMB, CKMBINDEX, TROPONINI in the last 72 hours.  BNP:No results for input(s): BNP in the last 72 hours. Lipid profile: No results for input(s): CHOL, TRIG, HDL, LDLCALC in the last 72 hours. Invalid input(s): LDL  Blood Gases: No results found for: PH, PCO2, PO2, HCO3, O2SAT  Thyroid functions: No results found for: TSH       Radiology Review:  Pertinent images / reports were reviewed as a part of this visit. CT Chest w/ contrast: No results found for this or any previous visit. CT Chest w/o contrast: No results found for this or any previous visit. CTPA: No results found for this or any previous visit. CXR PA/LAT: Results for orders placed during the hospital encounter of 08/02/21    XR CHEST (2 VW)    Narrative  EXAM: CHEST 2 VIEWS X-RAY    INDICATION: Epigastric pain    COMPARISON: CT abdomen and pelvis 8/2/2021    FINDINGS:    HEART / MEDIASTINUM: Within normal limits. LUNGS/PLEURA: No dense focal consolidation, pulmonary venous congestion, pleural effusion or pneumothorax. BONES / SOFT TISSUES: No acute abnormality. OTHER: None. Impression  No evidence for acute cardiopulmonary disease. There is no consolidation or other detectable airspace disease on plain radiographs. CXR portable: Results for orders placed during the hospital encounter of 09/21/21    XR CHEST PORTABLE    Narrative  EXAMINATION:  ONE XRAY VIEW OF THE CHEST    10/5/2021 5:50 pm    COMPARISON:  September 29, 2021    HISTORY:  ORDERING SYSTEM PROVIDED HISTORY: worsening hypoxic resp failure  TECHNOLOGIST PROVIDED HISTORY:  Reason for exam:->worsening hypoxic resp failure    FINDINGS:  Cardiomediastinal silhouette appears unchanged. Worsening bibasilar  predominant airspace disease. No definite effusion. No pneumothorax or  subdiaphragmatic free air. Impression  Worsening bibasilar predominant airspace disease.                      Pina Linares MD, MDEMETRA.            10/8/2021, 9:23 AM

## 2021-10-08 NOTE — PROGRESS NOTES
PALLIATIVE MEDICINE PROGRESS NOTE     Patient name:Ena Palacio    DCW:[id number] :1965  Room/Bed:E8M-7599/2124-01    LOS: 17 days        ASSESSMENT/RECOMMENDATIONS        60 y.o. female with dyspnea, debility and AMS.       Symptom Management:  Dyspnea: Patient now intubated and sedated. FIO2 at 100%. Debility: Patient intubated and sedated. Patient total care for all ADL's. AMS: Patient intubated and sedated, unresponsive. Goals of Care: Patient now intubated and sedated. Phoned patient's primary decision maker and  Herve Aviles. Reviewed patient's current health status, goals and code status. Herve Aviles indicated that the patient should continue to receive all aggressive therapy at this time. Code status reviewed and Herve Aviles indicted that the patient should remain a Full Code at this time.        Patient/Family Goals of Care :    10/8 - Patient now intubated and sedated. Phoned patient's primary decision maker and  Herve Aviles. Reviewed patient's current health status, goals and code status. Herve Aviles indicated that the patient should continue to receive all aggressive therapy at this time. Code status reviewed and Herve Aviles indicted that the patient should remain a Full Code at this time. 10/6 - Patient on BIPAP and not decisional according to RN report. Attended family meeting that also included Dr. Eric Roberts. All of the family's questions were answered. Herve Aviles, patient's , indicated the patient was okay to move forward with intubation and ventilator. Family pursuing aggressive therapy at this time and code status is currently Full Code.      Disposition/Discharge Plan:   Pending.     Advance Directives:  Surrogate Decision Maker: Herve Aviles, patient's . Code status:  Full Code     Case discussed with: patient's family, floor RN  Thank you for allowing us to participate in the care of this patient.       SUBJECTIVE     Chief Complaint: Dyspnea    Last 24 hours: Patient intubated and sedated. FIO2 100%. ROS:    Review of Systems   Unable to perform ROS: Intubated   All other systems reviewed and are negative. Patient unable to complete full ROS due to current cognitive status. Information that is obtained from nursing and chart. OBJECTIVE   BP (!) 98/54   Pulse 66   Temp 97.6 °F (36.4 °C) (Oral)   Resp 30   Ht 5' 3\" (1.6 m)   Wt 200 lb 6.4 oz (90.9 kg)   SpO2 91%   BMI 35.50 kg/m²   I/O last 3 completed shifts: In: 4836.5 [I.V.:4756.5; NG/GT:30; IV Piggyback:50]  Out: 318 [Urine:538]  I/O this shift:  In: 1349.9 [I.V.:1299.9; NG/GT:50]  Out: 127 [Urine:127]      Physical Exam  Vitals reviewed. Constitutional:       Appearance: She is ill-appearing. Interventions: She is sedated and intubated. Pulmonary:      Effort: She is intubated.              Total time: 50 minutes  >50% of time spent counseling patient at bedside or POA/family member if applicable , reviewing information and discussing care, coordinating with care team       Signed By: Electronically signed by JORGE Ko CNP on 10/8/2021 at 1:41 PM   Palliative Medicine   [phone]    October 8, 2021

## 2021-10-08 NOTE — PROGRESS NOTES
Physician Progress Note      PATIENT:               Beth Guillermo  [facility] #:                  [id number]  :                       1965  ADMIT DATE:       2021 12:15 AM  DISCH DATE:  RESPONDING  PROVIDER #:        Shaneka Bonilla MD          QUERY TEXT:    Pt admitted with COVID pneumonia and became increasingly less responsive. If   possible, please document in the progress notes and discharge summary if you   are evaluating and / or treating any of the following: The medical record reflects the following:  Risk Factors: COVID pneumonia, ARDS, OLEGARIO  Clinical Indicators: increasingly less responsive  Treatment: Decadron, Remdesivir, ID consult, intubation, vent    Thank you,  Carmela Griffiths, RN, BSN, CCDS  [email or web address]  Options provided:  -- Metabolic encephalopathy  -- Septic encephalopathy  -- Other - I will add my own diagnosis  -- Disagree - Not applicable / Not valid  -- Disagree - Clinically unable to determine / Unknown  -- Refer to Clinical Documentation Reviewer    PROVIDER RESPONSE TEXT:    This patient has metabolic encephalopathy. Query created by: Adine Meter on 10/7/2021 1:05 PM      QUERY TEXT:    Pt admitted with COVID pneumonia. Became increasingly less responsive. On   10/6, T up to 103 - P 132 - R 25. Developed ARDS and OLEGARIO. If possible, please   document in progress notes and discharge summary if you are evaluating and/or   treating: The medical record reflects the following:  Risk Factors: COVID pneumonia  Clinical Indicators: on 10/6, T up to 103 - P 132 - R 25. Developed ARDS and   OLEGARIO  Treatment: Treatment: Decadron, Remdesivir, ID consult, intubation, vent,   pressor support    Thank you,  Carmela Griffiths RN, BSN, CCDS  [email or web address]  Options provided:  -- Sepsis present on admission due to COVID-19 pneumonia  -- Sepsis due to COVID-19 pneumonia developed subsequent to admission  -- Other - I will add my own diagnosis  -- Disagree - Not applicable / Not valid  -- Disagree - Clinically unable to determine / Unknown  -- Refer to Clinical Documentation Reviewer    PROVIDER RESPONSE TEXT:    This patient has sepsis which was present on admission due to COVID-19   pneumonia. Query created by: Karina Stokes on 10/7/2021 1:05 PM      QUERY TEXT:    Pt admitted with COVID pneumonia and has shock documented at time of central   line insertion  If possible, please document in progress notes and discharge   summary further specificity regarding the type of shock: The medical record reflects the following:  Risk Factors: COVID pneumonia, ARDS, OLEGARIO, cardiopulmonary arrest  Clinical Indicators: BP as low as 78/10, P 109  Treatment: Roland-synephrine, Vasopressin, Levophed    Thank you,  Héctor Santana RN, BSN, CCDS  [email or web address]  Options provided:  -- Septic Shock  -- Hypovolemic Shock  -- Cardiogenic Shock  -- Other - I will add my own diagnosis  -- Disagree - Not applicable / Not valid  -- Disagree - Clinically unable to determine / Unknown  -- Refer to Clinical Documentation Reviewer    PROVIDER RESPONSE TEXT:    This patient is in septic shock.     Query created by: Karina Stokes on 10/7/2021 1:05 PM      Electronically signed by:  Telly Briscoe MD 10/8/2021 12:03 AM

## 2021-10-08 NOTE — PLAN OF CARE
Problem: Non-Violent Restraints  Goal: Removal from restraints as soon as assessed to be safe  Outcome: Met This Shift  Goal: No harm/injury to patient while restraints in use  Outcome: Met This Shift  Goal: Patient's dignity will be maintained  Outcome: Met This Shift     Problem: Airway Clearance - Ineffective  Goal: Achieve or maintain patent airway  Outcome: Ongoing     Problem: Gas Exchange - Impaired  Goal: Absence of hypoxia  Outcome: Ongoing  Goal: Promote optimal lung function  Outcome: Ongoing     Problem: Breathing Pattern - Ineffective  Goal: Ability to achieve and maintain a regular respiratory rate  Outcome: Ongoing     Problem:  Body Temperature -  Risk of, Imbalanced  Goal: Ability to maintain a body temperature within defined limits  Outcome: Ongoing  Goal: Will regain or maintain usual level of consciousness  Outcome: Ongoing  Goal: Complications related to the disease process, condition or treatment will be avoided or minimized  Outcome: Ongoing     Problem: Isolation Precautions - Risk of Spread of Infection  Goal: Prevent transmission of infection  Outcome: Ongoing     Problem: Nutrition Deficits  Goal: Optimize nutritional status  Outcome: Ongoing     Problem: Risk for Fluid Volume Deficit  Goal: Maintain normal heart rhythm  Outcome: Ongoing  Goal: Maintain absence of muscle cramping  Outcome: Ongoing  Goal: Maintain normal serum potassium, sodium, calcium, phosphorus, and pH  Outcome: Ongoing     Problem: Loneliness or Risk for Loneliness  Goal: Demonstrate positive use of time alone when socialization is not possible  Outcome: Ongoing     Problem: Fatigue  Goal: Verbalize increase energy and improved vitality  Outcome: Ongoing     Problem: Patient Education: Go to Patient Education Activity  Goal: Patient/Family Education  Outcome: Ongoing     Problem: Pain:  Goal: Pain level will decrease  Description: Pain level will decrease  Outcome: Ongoing  Goal: Control of acute pain  Description: Control of acute pain  Outcome: Ongoing  Goal: Control of chronic pain  Description: Control of chronic pain  Outcome: Ongoing  Goal: Patient's pain/discomfort is manageable  Description: Patient's pain/discomfort is manageable  Outcome: Ongoing     Problem: Infection:  Goal: Will remain free from infection  Description: Will remain free from infection  Outcome: Ongoing     Problem: Safety:  Goal: Free from accidental physical injury  Description: Free from accidental physical injury  Outcome: Ongoing  Goal: Free from intentional harm  Description: Free from intentional harm  Outcome: Ongoing     Problem: Daily Care:  Goal: Daily care needs are met  Description: Daily care needs are met  Outcome: Ongoing     Problem: Skin Integrity:  Goal: Skin integrity will stabilize  Description: Skin integrity will stabilize  Outcome: Ongoing     Problem: Discharge Planning:  Goal: Patients continuum of care needs are met  Description: Patients continuum of care needs are met  Outcome: Ongoing     Problem: Skin Integrity:  Goal: Will show no infection signs and symptoms  Description: Will show no infection signs and symptoms  Outcome: Ongoing  Goal: Absence of new skin breakdown  Description: Absence of new skin breakdown  Outcome: Ongoing     Problem: Nutrition  Goal: Optimal nutrition therapy  10/8/2021 1430 by Dale Galdamez RN  Outcome: Ongoing  10/8/2021 1042 by Luan Hicks RD, LD  Outcome: Ongoing

## 2021-10-08 NOTE — PLAN OF CARE
Problem: Nutrition  Goal: Optimal nutrition therapy  Outcome: Ongoing   Nutrition Problem #1: Inadequate oral intake  Intervention: Food and/or Nutrient Delivery:  (Start nutrition when able per MD)  Nutritional Goals: initiate most appropriate form of nutrition per MD

## 2021-10-08 NOTE — PROGRESS NOTES
Nephrology ([facility]) Progress Note    CC: OLEGARIO    Subjective:    HPI:  Ventilated. Renal function worse. ROS:  In bed. [address]:  medications reviewed. Objective:  Blood pressure (!) 98/54, pulse 66, temperature 97.6 °F (36.4 °C), temperature source Oral, resp. rate 30, height 5' 3\" (1.6 m), weight 200 lb 6.4 oz (90.9 kg), SpO2 91 %, not currently breastfeeding. Intake/Output Summary (Last 24 hours) at 10/8/2021 1412  Last data filed at 10/8/2021 1335  Gross per 24 hour   Intake 6156.36 ml   Output 475 ml   Net 5681.36 ml     General:  NAD, ventilated  Chest:  coarse BS  CVS:  RRR  Abdominal:  NTND, soft, decreasedBS  Extremities:  trace edema  Skin:  no rash    Labs:  Renal panel:  Lab Results   Component Value Date/Time     (L) 10/08/2021 05:27 AM    K 4.9 10/08/2021 05:27 AM    K 4.6 10/06/2021 04:20 AM    CO2 19 (L) 10/08/2021 05:27 AM    BUN 64 (H) 10/08/2021 05:27 AM    CREATININE 3.6 (H) 10/08/2021 05:27 AM    CALCIUM 8.0 (L) 10/08/2021 05:27 AM    PHOS 9.8 (H) 10/08/2021 05:27 AM    MG 2.40 10/08/2021 05:27 AM     CBC:  Lab Results   Component Value Date/Time    WBC 14.4 (H) 10/08/2021 05:27 AM    HGB 6.7 (LL) 10/08/2021 06:46 AM    HCT 20.6 (LL) 10/08/2021 06:46 AM     10/08/2021 05:27 AM       Assessment/Plan:  Reviewed old records and labs.     1) OLEGARIO              - ATN +/- Covid-19              - renal function is worsening and will likely need dialysis in next couple of days              - given poor prognosis, we will only consider her a candidate for HD, if necessary     2) ventilated              - per pulmonary     3) Covid-19              - per pulmonary, hospitalist, and ID     4) FEN              - hyperkalemia                          - on kayexalate              - metabolic acidosis                          - monitor              - hyperphosphatemia                          - not much to be done right now              - hyponatremia

## 2021-10-08 NOTE — PROGRESS NOTES
Hospitalist Progress Note      PCP: Raymond Marin    Date of Admission: 9/21/2021    Subjective: cont to be intubated, renal fn worsening    Medications:  Reviewed    Infusion Medications    sodium chloride      dextrose 5 % and 0.45 % NaCl      phenylephrine (RICHELLE-SYNEPHRINE) 50mg/250mL infusion 125 mcg/min (10/08/21 1316)    vasopressin (Septic Shock) infusion 0.04 Units/min (10/08/21 1455)    insulin 3.21 Units/hr (10/08/21 1600)    sodium bicarbonate in sterile water infusion 100 mL/hr at 10/08/21 1402    propofol 30 mcg/kg/min (10/08/21 1316)    HYDROmorphone 3 mg (10/07/21 2326)    cisatracurium (NIMBEX) infusion 3.5 mcg/kg/min (10/08/21 1604)    midazolam 6 mg/hr (10/08/21 1615)    norepinephrine Stopped (10/07/21 2156)    sodium chloride      dextrose       Scheduled Medications    heparin (porcine)  5,000 Units SubCUTAneous 3 times per day    famotidine (PEPCID) injection  20 mg IntraVENous Daily    cefepime  1,000 mg IntraVENous Q24H    dexamethasone  6 mg IntraVENous Q24H    [Held by provider] voriconazole  4 mg/kg (Adjusted) IntraVENous Q12H    chlorhexidine  15 mL Mouth/Throat BID    lubrifresh P.M.    Both Eyes 6 times per day    sodium chloride flush  5-40 mL IntraVENous 2 times per day    fluticasone  2 puff Inhalation BID    aspirin  81 mg Oral Daily    [Held by provider] carvedilol  3.125 mg Oral Daily    fluticasone  2 spray Nasal Daily    traZODone  50 mg Oral Nightly    [Held by provider] atorvastatin  10 mg Oral Daily    [Held by provider] pregabalin  200 mg Oral BID     PRN Meds: sodium chloride, dextrose, dextrose 5 % and 0.45 % NaCl, sodium chloride flush, sodium chloride, sodium chloride, albuterol sulfate HFA, ondansetron **OR** ondansetron, acetaminophen **OR** acetaminophen, glucose, dextrose, glucagon (rDNA), dextrose      Intake/Output Summary (Last 24 hours) at 10/8/2021 1635  Last data filed at 10/8/2021 1605  Gross per 24 hour   Intake 4918.7 ml Output 510 ml   Net 4408.7 ml       Physical Exam Performed:    BP (!) 90/54   Pulse 62   Temp 97.7 °F (36.5 °C) (Oral)   Resp 30   Ht 5' 3\" (1.6 m)   Wt 200 lb 6.4 oz (90.9 kg)   SpO2 (!) 88%   BMI 35.50 kg/m²        General appearance: intubated  HEENT Normal cephalic, atraumatic without obvious deformity. Pupils equal, round, and reactive to light.  Extra ocular muscles intact. Conjunctivae/corneas clear. Neck: Supple, No jugular venous distention/bruits. Trachea midline without thyromegaly or adenopathy with full range of motion. Lungs: diminished throughout, basal crepts  Heart: Regular rhythm, tachy with Normal S1/S2  Abdomen: Soft, non-tender or non-distended without rigidity or guarding and positive bowel sounds  Extremities: No clubbing, cyanosis, or edema bilaterally. Skin: Skin color, texture, turgor normal. No rashes or lesions. Neurologic: intubated, cannot be assessed  Mental status: intubated, cannot be assessed       Labs:   Recent Labs     10/06/21  0420 10/06/21  0420 10/07/21  0400 10/08/21  0527 10/08/21  0646   WBC 28.4*  --  19.3* 14.4*  --    HGB 10.0*   < > 8.9* 6.9* 6.7*   HCT 31.7*   < > 27.9* 21.3* 20.6*     --  273 197  --     < > = values in this interval not displayed. Recent Labs     10/06/21  0420 10/06/21  0420 10/07/21  0400 10/07/21  0949 10/08/21  0527   *   < > 125* 130* 133*   K 4.6  --  6.3* 5.0 4.9   CL 99   < > 89* 94* 93*   CO2 20*   < > 15* 17* 19*   BUN 25*   < > 50* 53* 64*   CREATININE 0.8   < > 2.4* 2.4* 3.6*   CALCIUM 9.0   < > 8.1* 7.8* 8.0*   PHOS 3.8  --  9.2*  --  9.8*    < > = values in this interval not displayed. Recent Labs     10/07/21  0400 10/08/21  0527   AST 4,646* 1,608*   ALT 2,151* 1,322*   BILITOT 1.4* 1.7*   ALKPHOS 218* 149*     No results for input(s): INR in the last 72 hours. No results for input(s): Ardelle Chalk in the last 72 hours.     Urinalysis:      Lab Results   Component Value Date    NITRU Negative intubated 10/6  10/6 - Tried to paralyze and prone - but pt not tolerating proned     Acute respiratory failure with hypoxia  Titrate oxygen to keep saturations above 90%  Likely secondary to COVID-19 pneumonia  On 80-->100 fiO2 and 60L O2  Place on BIPAP-->less responsive-->intubated 10/6     Serum Beta D glucan +ve - urine histo, aspergillus Ag, fungal serologies, sputum Cx; oral Vfend per ID - holding for now 2/2 shock liver     Transaminitis - suspect 2/2 shock liver, cont to monitor LFT, slowly improving     ARF with hyperkalemia and metabolic acidosis - creat worsened from 0.8-->2.4-->3.6, suspect pre-renal from sepsis/shock/hypotension, nephrology consulted.      Anemia - hb dropped from 8.9-->6.7, blood transfusion today, recheck hb after transfusion     Hypertension - now hypotensive  BP soft, holding coreg, dced losartan     Hyperlipidemia  Continue home medications     Uncontrolled diabetes mellitus type 2  Hold home medications  On Basal and ISS - Adjust PRN - decadron related   Carb controlled diet     Constipation - added miralax       Diet: Diet NPO  Code Status: Full Code    PT/OT Eval Status: ordered    Dianne Ruel Morocho MD

## 2021-10-08 NOTE — PROGRESS NOTES
Infectious Disease Follow up Notes  Admit Date: 9/21/2021  Hospital Day: 18    Antibiotics :   IV Dexamethasone   IV Cefepime     CHIEF COMPLAINT:     COVID 19 PNA  ARDS  DM+   Positive Beta D glucan  Septic shock  CODE BLUE      Subjective interval History :  60 y.o. who has a past medical history of bipolar disorder, chronic back pain, depression, diabetes, hypertension admitted to the hospital secondary to shortness of breath and cough. She works at [employer] she thinks metal exposed at the last visit and tested positive for COVID-19. Was found to be hypoxic at 75% on room air on admission. Also blood glucose elevated at 1 300s. Chest x-ray consistent with COVID-19 pneumonia with bilateral opacities. On admission WBC count normal with ongoing lymphocytopenia secondary to COVID-19 infection. While on dexamethasone her WBC count has increased. She is required heated high flow secondary on ongoing hypoxemia. Serum Beta D glucan +Ve on serological testing and we are consulted for recommendations.       Interval History : Remains intubated on the vent on multiple pressor support and paralyzed  and Trach aspirate GNR noted and remains on high Fio2     Past Medical History:    Past Medical History:   Diagnosis Date    Asthma     B[facility] disorder ([facility]     Chronic back pain     COVID-19 09/17/2021    DDD (degenerative disc disease)     Depression     Diabetes mellitus ([facility]     Fibromyalgia     Hyperlipidemia     Hypertension        Past Surgical History:    Past Surgical History:   Procedure Laterality Date    CARPAL TUNNEL RELEASE      HAND SURGERY      HYSTERECTOMY         Current Medications:    No outpatient medications have been marked as taking for the 9/21/21 encounter [facility] Encounter).        Allergies:  Latex, Doxycycline, Flagyl [metronidazole], Ibuprofen, Iv dye [iodides], and Strawberry extract    Immunizations :   Immunization History   Administered Date(s) Administered    Pneumococcal Polysaccharide ([id number]) 06/02/2014       Social History:    Social History     Tobacco Use    Smoking status: Never Smoker    Smokeless tobacco: Never Used   Vaping Use    Vaping Use: Never used   Substance Use Topics    Alcohol use: Not Currently     Comment: 1-2 times a month    Drug use: No     Social History     Tobacco Use   Smoking Status Never Smoker   Smokeless Tobacco Never Used      Family History   Problem Relation Age of Onset    Heart Disease Mother     Heart Disease Father          REVIEW OF SYSTEMS:     Not possible due to ventilation                PHYSICAL EXAM:      Vitals:    /61   Pulse 64   Temp 97.6 °F (36.4 °C) (Oral)   Resp 30   Ht 5' 3\" (1.6 m)   Wt 200 lb 6.4 oz (90.9 kg)   SpO2 92%   BMI 35.50 kg/m²       General Appearance: intubated sedated on the vent in some distress++, ++ pallor, no icterus   Skin: warm and dry, no rash or erythema  Head: normocephalic and atraumatic  Eyes: pupils equal, round, and reactive to light, conjunctivae normal  ENT: tympanic membrane, external ear and ear canal normal bilaterally, nose without deformity, nasal mucosa and turbinates normal without polyps  Neck: supple and non-tender without mass, no thyromegaly  no cervical lymphadenopathy  Pulmonary/Chest: Bi basal crepts++  no wheezes, rales or rhonchi, normal air movement, in  respiratory distress  Cardiovascular:  S1 and S2, no murmurs, rubs, clicks, or gallops, no carotid bruits  Abdomen: soft, non-tender, non-distended, normal bowel sounds, no masses or organomegaly  Extremities: no cyanosis, clubbing or edema  Musculoskeletal: normal range of motion, no joint swelling, deformity or tenderness  Integumentary: No rashes, no abnormal skin lesions, no petechiae  Neurologic: reflexes normal and symmetric, no cranial nerve deficit           Lines: PICC  [employer] Chemical Review:    CBC:   Lab Results   Component Value Date    WBC 14.4 (H) 10/08/2021    HGB 6.7 (LL) 10/08/2021    HCT 20.6 (LL) 10/08/2021    MCV 91.8 10/08/2021     10/08/2021     RENAL:   Lab Results   Component Value Date    CREATININE 3.6 (H) 10/08/2021    BUN 64 (H) 10/08/2021     (L) 10/08/2021    K 4.9 10/08/2021    CL 93 (L) 10/08/2021    CO2 19 (L) 10/08/2021     SED RATE: No results found for: SEDRATE  CK: No results found for: CKTOTAL  CRP: No results found for: CRP  Hepatic Function Panel:   Lab Results   Component Value Date    ALKPHOS 149 10/08/2021    ALT 1,322 10/08/2021    AST 1,608 10/08/2021    PROT 5.6 10/08/2021    BILITOT 1.7 10/08/2021    BILIDIR <0.2 02/05/2016    IBILI see below 02/05/2016    LABALBU 2.5 10/08/2021     UA:  Lab Results   Component Value Date    COLORU YELLOW 09/21/2021    CLARITYU Clear 09/21/2021    GLUCOSEU >=1000 09/21/2021    BILIRUBINUR Negative 09/21/2021    KETUA 15 09/21/2021    SPECGRAV >1.030 09/21/2021    BLOODU Negative 09/21/2021    PHUR 5.5 09/21/2021    PROTEINU 100 09/21/2021    UROBILINOGEN 0.2 09/21/2021    NITRU Negative 09/21/2021    LEUKOCYTESUR Negative 09/21/2021    LABMICR YES 09/21/2021    URINETYPE NotGiven 09/21/2021      Urine Microscopic:   Lab Results   Component Value Date    BACTERIA 1+ 09/21/2021    COMU see below 09/21/2021    HYALCAST 3 09/21/2021    WBCUA 1 09/21/2021    RBCUA 2 09/21/2021    EPIU 3 09/21/2021     Urine Reflex to Culture:   Lab Results   Component Value Date    URRFLXCULT Not Indicated 09/21/2021 9/29/2021  2:19 PM - Mikhail Britt Incoming Lab Results From Soft (Epic Adt)     Component Value Ref Range & Units Status Collected Lab   (1,3)-Beta-D-Glucan (Fungitell) Interpretation PositiveAbnormal   Negative Final 09/27/2021  1:07 PM [facility]   INTERPRETIVE INFORMATION: (1,3)-beta-D-glucan (Fungitell)     Less than 31 pg/mL . .................. Negative     31-59 pg/mL . .........................  Negative     60-79 pg/mL Josy Back ....................... Indeterminate     Greater than or equal to 80 pg/mL . ... Positive   The Fungitell test is indicated for presumptive diagnosis   of fungal infection and should be used in conjunction with   other diagnostic procedures. This test does not detect   certain fungal species such as Cryptococcus, which produce   very low levels of (1,3)-beta-D-glucan. This test will not   detect the zygomycetes, such as Absidia, Mucor, and   Rhizopus, which are not known to produce   (1,3)-beta-D-glucan. In addition, the yeast phase of      Procal 4.83     ALT  2151   AST  4646     CREAT  2.4     MICRO: cultures reviewed and updated by me   Blood Culture: No results found for: [facility]  Procedure Component Value Units Date/Time   MRSA DNA Probe, Nasal [[id number]] Collected: 10/08/21 1041   Order Status: Sent Specimen: Nares Updated: 10/08/21 1042   Culture, Blood 1 [[id number]] Collected: 10/08/21 0750   Order Status: Sent Specimen: Blood Updated: 10/08/21 0755   Culture, Blood 2 [[id number]] Collected: 10/08/21 0750   Order Status: Sent Specimen: Blood Updated: 10/08/21 0755   Culture, Respiratory [[id number]] Collected: 10/07/21 1644   Order Status: Sent Specimen: Tracheal Aspirate Updated: 10/08/21 0753    Gram Stain Result No WBC's seen   4+ Gram negative rods    Narrative:     ORDER#: [id number]                          ORDERED BY: Katina Sinha   SOURCE: Tracheal Aspirate                  COLLECTED:  10/07/21 16:44   ANTIBIOTICS AT [facility]. :                      RECEIVED :  10/07/21 16:44   Performed at:   [place] [address] [address] [facility] [address]   Phone [phone]   Culture, Blood 1 [[id number]]    Order Status: Canceled Specimen: Blood    Culture, Blood 2 [[id number]]    Order Status: Canceled Specimen: Blood    Culture, Blood 2 [[id number]]    Order Status: Canceled Specimen: Blood    Culture, Blood 1 [[id number]]    Order Status: Canceled Specimen: Blood    Culture, Respiratory [[id number]] Collected: 09/30/21       Respiratory Culture:  Lab Results   Component Value Date    CULTRESP Normal respiratory mariza 09/30/2021    LABGRAM No WBC's seen  4+ Gram negative rods   10/07/2021     AFB:No results found for: [facility]  Viral Culture:  Lab Results   Component Value Date    COVID19 Detected 09/17/2021     Urine Culture: No results for input(s): Agusto Jones in the last 72 hours. IMAGING:    XR CHEST PORTABLE   Final Result   Stable bilateral perihilar and bibasilar ill-defined consolidation consistent   with alveolar edema versus pneumonia plus or minus mild bilateral pleural   effusions. Mild-to-moderate cardiomegaly. XR CHEST PORTABLE   Final Result   Supportive tubing projects in normal position. Vascular congestion. Bibasilar airspace disease, pulmonary edema versus atelectasis or pneumonia. Possible small pleural effusions. XR CHEST PORTABLE   Final Result   Endotracheal tube tip is 4 cm above the stevie. Appropriate position of the nasogastric tube with side hole well beyond the   GE junction. Persistent diffuse bilateral airspace disease. XR CHEST PORTABLE   Final Result   Worsening bibasilar predominant airspace disease. XR CHEST PORTABLE   Final Result   Interval improvement in bibasilar opacities consistent with improving   pneumonia. XR CHEST PORTABLE   Final Result   Bibasilar opacities are favored to represent pneumonia.                All the pertinent images and reports for the current Hospitalization were reviewed by me     Scheduled Meds:   heparin (porcine)  5,000 Units SubCUTAneous 3 times per day    famotidine (PEPCID) injection  20 mg IntraVENous Daily    cefepime  1,000 mg IntraVENous Q24H    dexamethasone  6 mg IntraVENous Q24H    [Held by provider] voriconazole  4 mg/kg (Adjusted) IntraVENous Q12H    chlorhexidine  15 mL Mouth/Throat BID    lubrifresh P.M.   Both Eyes 6 times per day    sodium chloride flush  5-40 mL IntraVENous 2 times per day    fluticasone  2 puff Inhalation BID    aspirin  81 mg Oral Daily    [Held by provider] carvedilol  3.125 mg Oral Daily    fluticasone  2 spray Nasal Daily    traZODone  50 mg Oral Nightly    [Held by provider] atorvastatin  10 mg Oral Daily    [Held by provider] pregabalin  200 mg Oral BID       Continuous Infusions:   sodium chloride      dextrose 5 % and 0.45 % NaCl      phenylephrine (RICHELLE-SYNEPHRINE) 50mg/250mL infusion 75 mcg/min (10/08/21 1024)    vasopressin (Septic Shock) infusion 0.04 Units/min (10/07/21 2150)    insulin 1.04 Units/hr (10/08/21 1026)    sodium bicarbonate in sterile water infusion 100 mL/hr at 10/08/21 0128    propofol 30 mcg/kg/min (10/08/21 0609)    HYDROmorphone 3 mg (10/07/21 2326)    cisatracurium (NIMBEX) infusion 4.5 mcg/kg/min (10/08/21 1101)    midazolam 6 mg/hr (10/07/21 2151)    norepinephrine Stopped (10/07/21 2156)    sodium chloride      dextrose         PRN Meds:  sodium chloride, dextrose, dextrose 5 % and 0.45 % NaCl, potassium chloride, sodium phosphate IVPB **OR** sodium phosphate IVPB, sodium chloride flush, sodium chloride, sodium chloride, albuterol sulfate HFA, magnesium sulfate, ondansetron **OR** ondansetron, acetaminophen **OR** acetaminophen, glucose, dextrose, glucagon (rDNA), dextrose      Assessment:     Patient Active Problem List   Diagnosis    Syncope and collapse    Community acquired bacterial pneumonia    Abnormal cardiovascular stress test    Essential hypertension    Mixed hyperlipidemia    Pneumonia due to COVID-19 virus    Acute respiratory distress syndrome (ARDS) due to COVID-19 virus (HCC)    Acute respiratory failure with hypoxia (HCC)    Dyspnea    COVID-19 virus infection    Septic shock (HCC)    Acute renal failure with acute cortical necrosis (HCC)     COVID 19 PNA  Acute Hypoxic resp failure   DM poor control Depression  Bi polar  Obesity BMI at 35  CXR with Multifocal PNA  Serum Beta D glucan +Ve  SEPTIC SHOCK  Cardiac arrest  Now on the Ventilator  Shock liver  OLEGARIO+         She remains critically ill in ICU from shock and organ failure on high flow Oxygen on the vent and on 3 pressors with OLEGARIO    Given the resp failure and prolonged hospital stay will need IV abx coverage for shock     Urine antigen testing  -ve Aspergillus antigen -ve Fungal antibodies if negative  Beta d GLUCAN repeat  +Ve given shock liver Holding V fend and will resume tomorrow if lfts cont to improve    Will be able to use oral V fend once Lfts better and cannot use - IV formulation due to OLEGARIO have to use oral Formulation        Labs, Microbiology, Radiology and all the pertinent results from current hospitalization and  care every where were reviewed  by me as a part of the evaluation   Plan:   1. On low dose IV Dexamethasone per ICU team   2. Completed Barcitinib   3. Cont  IV Cefepime x 1 gm q 24 HR due to worsening creat   4. Fungal serologies  -ve    5. Urine Histo antigen  -ve    6. Trach aspirate Gram -ve noted  7. Holding V fend until lfts improve will choose oral Formulation   8. Control DM   9. Repeat Beta D glucan results still positive +Ve  10. Remains critically ill   11. Will check COVID as its been 20 days since positive     · Discussed with patient/Family and Nursing staff  D/w pharmacy      · Patient is critically ill and has a potentially life threatening infection that poses threat to life/bodily function. · There is potential for worsening infection/ sudden clinically significant or life-threatening deterioration in the patient's condition without appropriate antimicrobial therapy. · Complex medical decision making process was involved to select appropriate antimicrobial agents to reverse the cause of patient's severe infection/ illness.   · Antimicrobial therapy requires intensive monitoring for toxicity and frequent dose adjustments to prevent toxicity and permanent end-organ dysfunction    CC time : 32 min     Thanks for allowing me to participate in your patient's care and please call me with any questions or concerns.     Remigio Bear MD  Infectious Disease  [facility]) Physician  Phone: [phone]   Fax : [phone]

## 2021-10-08 NOTE — CONSULTS
RD did not conduct direct, in-person nutrition evaluation in efforts to reduce exposure and use of PPE for high risk persons, PUI persons, patients who have tested positive for Covid-19 or those awaiting respiratory panel results. EMR was screened for nutrition risk factors, as defined per nutrition standards of care. Comprehensive Nutrition Assessment    Type and Reason for Visit:  Reassess, Consult (TF ordering and management (mechanical vent order set))    Nutrition Recommendations/Plan:   Monitor for start of nutrition. If TF initiated, consult RD for \"Tube Feedings order and management\". · Recommend Nepro @ 15 mL/hr + 3 proteinex daily. Flush per provider. Monitor for changes in propofol dose to adjust TF, currently 15.8 mL/hr. Nutrition Assessment:  Consult (TF ordering and management-vent order set)/Follow-up. Pt remains in isolation for COVID. Pt became less responsive on bipap and was intubated 10/6. OG in place. Propofol @ 15.8 mL/hr providing 417 kcal daily. Pt on andree and vaso. Insulin drip on board  with BS improved. Phos high, renal function worsening. Per MD, holding off on TF. No TF x 1 week, needs regimen. Will put rec's in for possibly starting TF over weekend. Malnutrition Assessment:  Malnutrition Status:  No malnutrition      Estimated Daily Nutrient Needs:  Energy (kcal):  990-1260 kcal( 11-14 kcal/kg 90 kg CBW)  Protein (g):  105 gm (2gm/kg IBW)  Fluid (ml/day):  per provider    Nutrition Related Findings:  trace generalized edema; no BM x 1 week; Glu improving      Wounds:  None       Current Nutrition Therapies:    Diet NPO  Current Tube Feeding (TF) Recommendations:  · Goal TF & Flush Orders Provides: Recommend Nepro @ 15 mL/hr + 3 proteinex daily (adjusted for propofol dose). Flush per provider.  TF regimen provides: 300 mL TV, 852 kcal, 102 gm pro, 218 mL free water (includes 3 proteinex daily)    Additional Calorie Sources:    Propofol @ 15.8 mL/hr providing 417 kcal daily. Anthropometric Measures:  · Height: 5' 3\" (160 cm)  · Current Body Weight: 200 lb (90.7 kg)   · Admission Body Weight: 185 lb (83.9 kg)    · Ideal Body Weight: 115 lbs; % Ideal Body Weight 173.9 %   · BMI: 35.4  · BMI Categories: Obese Class 1 (BMI 30.0-34. 9)       Nutrition Diagnosis:   · Inadequate oral intake related to impaired respiratory function as evidenced by intubation, NPO or clear liquid status due to medical condition      Nutrition Interventions:   Food and/or Nutrient Delivery:   (Start nutrition when able per MD)  Nutrition Education/Counseling:  No recommendation at this time   Coordination of Nutrition Care:  Continue to monitor while inpatient    Goals:  initiate most appropriate form of nutrition per MD       Nutrition Monitoring and Evaluation:   Food/Nutrient Intake Outcomes:  Enteral Nutrition Intake/Tolerance  Physical Signs/Symptoms Outcomes:  Biochemical Data, Skin, Weight, Nutrition Focused Physical Findings, Hemodynamic Status     Discharge Planning:     Too soon to determine     Electronically signed by Shelli Freeman RD, LD on 10/8/21 at 10:40 AM EDT    Contact: [phone]

## 2021-10-08 NOTE — CARE COORDINATION
Patient now in ICU, intubated, sedated, and on pressors. Palliative care has been consulted and family wishes to continue all aggressive care at this time. Will continue to follow for care coordination needs.   Paulina Kumar RN, BSN, Case Management  Phone: [phone]  Electronically signed by Paulina Kumar RN on 10/8/2021 at 2:09 PM

## 2021-10-08 NOTE — PROGRESS NOTES
1205: Blood transfusion started. Blood reaches vein. 1207: RT called d/t cuff leak  1213: Pt repositioned, laid flat pulled up in bed and turned to right side. 1216: RT at bedside. 1220: Pt desaturating and becoming hypotensive. Temperature reassessed 97.7. Wedge removed and blood transfusion stopped. Normal saline infusing in place of blood. See eMAR for titrations related to BP medications. 1249: Dr. Blaine Chavez via [employer]   : Dr. Archana Thorpe called through [employer]. secure voice message left. 1258: Dr. Archana Thorpe at pt bedside adjusting vent settings. Verbal order to restart blood transfusion, blood now infusing at 150 mL/hr.

## 2021-10-09 NOTE — PROGRESS NOTES
Code documentation cont. At 1724 CPR continued. At 1726 pulse check/ faint pulse noted. CPR resumed. At 1727 Epinephrine 1 mg administered. At 1728, pulse check. PEA. CPR resumed. Dr. Svetlana Rasmussen on phone with . At 1730, pulse check. PEA  At 1732, Dr. Svetlana Rasmussen finished speaking with the . At 1736, pulse check. PEA. Cont CPR. At 1740, pulse check, Agonal rhythm.

## 2021-10-09 NOTE — PROGRESS NOTES
At 1713, patient lost a pulse and CPR was started. Snow camarena called. Dr. Dayron Hassan at bedside. At 1714, epinephrine 1 mg was administered per hospitalist.  At [phone], patient with ROSC. At 1718 Epinephrine 1 MG administered. At 1720 patient in vfib.  1 unsynchronized shock delivered at 120 Joules. CPR started. At 1723 BP 32/24 without compressions  At 1724, Epinephrine 1 mg adminsitered  At 1724 Vfib. Patient unsynchronized shock at 200 joules.

## 2021-10-09 NOTE — PROGRESS NOTES
Around 4:50pm code was called for pulseless bradycardia. Patient received one round of chest compressions and epinephrine and had return of pulse in the low normal range. After regaining pulse, oxygen saturation improved to >70% and SBP improved to levels >60, but after a several minutes, she again progressed to pulseless bradycardia. The above mentioned cycle continued for almost 30 minutes with increasing duration of CRP and decreasing intervals of ROSC. Two of these episodes did have VFib instead of bradycardia, and she responded to cardioversion but again became hypoxic and puseless. Evenetually despite interventions, she developed asystole. Further CRP was stopped. See death note for time of death.

## 2021-10-09 NOTE — PROGRESS NOTES
Critical Care Progress note Note     Patient's name:  Latasha Zapata  Medical Record Number: [id number]  Patient's account/billing number: [de-identified]  Patient's YOB: 1965  Age: 60 y.o. Date of Admission: 9/21/2021 12:15 AM  Date of Consult: 10/9/2021      Primary Care Physician: Ric Matamoros      Code Status: Full Code    Reason for consult: acute hypoxic respiratory failure     Assessment and Plan     1. Acute respiratory failure with hypoxia  2. covid 19 PNA ARDS  3. S/p Cardiac arrest respiratory triggered  4. ARF oliguric with metabolic acidosis  5. DMII uncontrolled   6. Sock liver  7. Anemia       Plan:  Condition continued to deteriorate with worsening hypoxia and multiorgan failure, discussed with  and requested family to visit as she will not survive for long. Continue current management  CC time 40 minutes     Subjective: Worsening hypoxia despite maximum vent settings  CXR with worsening bilateral extensive lung disease and new effusion      REVIEW OF SYSTEMS:  Unable to obtain on the vent         Physical Exam:    Vitals: BP (!) 110/59   Pulse 88   Temp 96.1 °F (35.6 °C) (Axillary)   Resp 30   Ht 5' 3\" (1.6 m)   Wt 210 lb 15.7 oz (95.7 kg)   SpO2 (!) 78%   BMI 37.37 kg/m²     Last Body weight:   Wt Readings from Last 3 Encounters:   10/09/21 210 lb 15.7 oz (95.7 kg)   09/17/21 186 lb 3.2 oz (84.5 kg)   08/16/21 194 lb (88 kg)       Body Mass Index : Body mass index is 37.37 kg/m². Intake and Output summary:     Intake/Output Summary (Last 24 hours) at 10/9/2021 0819  Last data filed at 10/9/2021 0600  Gross per 24 hour   Intake 4942.65 ml   Output 311 ml   Net 4631.65 ml       Physical Examination:     PHYSICAL EXAM:    Gen:  Sedated on MV  Eyes: PERRL. Anicteric sclera. No conjunctival injection. ENT: No discharge. Posterior oropharynx clear. External appearance of ears and nose normal.  Neck: Trachea midline.  No mass, no lymphadenopathy    Resp:  Diminished with scattered rhonchi no wheezing   CV:  tachy. Regular rhythm. No murmur or rub. No edema. GI: Soft, Non-tender. Non-distended. +BS  Skin: Warm, dry, w/o erythema. Lymph: No cervical or supraclavicular LAD. M/S: No cyanosis. No clubbing. Psych: sedated on MV        Laboratory findings:-    CBC:   Recent Labs     10/09/21  0347   WBC 12.1*   HGB 7.9*        BMP:    Recent Labs     10/07/21  0949 10/07/21  0949 10/08/21  0527 10/08/21  0527 10/09/21  0347   *   < > 133*   < > 131*   K 5.0   < > 4.9   < > 4.7   CL 94*   < > 93*   < > 88*   CO2 17*   < > 19*   < > 22   BUN 53*   < > 64*   < > 75*   CREATININE 2.4*  --  3.6*  --  4.1*   GLUCOSE 368*   < > 130*   < > 123*    < > = values in this interval not displayed. S. Calcium:  Recent Labs     10/09/21  0347   CALCIUM 7.0*     S. Ionized Calcium:No results for input(s): IONCA in the last 72 hours. S. Magnesium:  Recent Labs     10/09/21  0347   MG 2.20     S. Phosphorus:  Recent Labs     10/09/21  0347   PHOS 10.5*     S. Glucose:  Recent Labs     10/09/21  0549 10/09/21  0700 10/09/21  0803   POCGLU 128* 135* 127*     Glycosylated hemoglobin A1C: No results for input(s): LABA1C in the last 72 hours. INR: No results for input(s): INR in the last 72 hours. Hepatic functions:   Recent Labs     10/09/21  0347   ALKPHOS 173*   *   *   PROT 5.6*   BILITOT 2.8*   LABALBU 2.3*     Pancreatic functions:No results for input(s): LACTA, AMYLASE in the last 72 hours. S. Lactic Acid: No results for input(s): LACTA in the last 72 hours. Cardiac enzymes:No results for input(s): CKTOTAL, CKMB, CKMBINDEX, TROPONINI in the last 72 hours. BNP:No results for input(s): BNP in the last 72 hours. Lipid profile: No results for input(s): CHOL, TRIG, HDL, LDLCALC in the last 72 hours.     Invalid input(s): LDL  Blood Gases: No results found for: PH, PCO2, PO2, HCO3, O2SAT  Thyroid functions: No results found for: TSH       Radiology Review:  Pertinent images / reports were reviewed as a part of this visit. CT Chest w/ contrast: No results found for this or any previous visit. CT Chest w/o contrast: No results found for this or any previous visit. CTPA: No results found for this or any previous visit. CXR PA/LAT: Results for orders placed during the hospital encounter of 08/02/21    XR CHEST (2 VW)    Narrative  EXAM: CHEST 2 VIEWS X-RAY    INDICATION: Epigastric pain    COMPARISON: CT abdomen and pelvis 8/2/2021    FINDINGS:    HEART / MEDIASTINUM: Within normal limits. LUNGS/PLEURA: No dense focal consolidation, pulmonary venous congestion, pleural effusion or pneumothorax. BONES / SOFT TISSUES: No acute abnormality. OTHER: None. Impression  No evidence for acute cardiopulmonary disease. There is no consolidation or other detectable airspace disease on plain radiographs. CXR portable: Results for orders placed during the hospital encounter of 09/21/21    XR CHEST PORTABLE    Narrative  EXAMINATION:  ONE XRAY VIEW OF THE CHEST    10/5/2021 5:50 pm    COMPARISON:  September 29, 2021    HISTORY:  ORDERING SYSTEM PROVIDED HISTORY: worsening hypoxic resp failure  TECHNOLOGIST PROVIDED HISTORY:  Reason for exam:->worsening hypoxic resp failure    FINDINGS:  Cardiomediastinal silhouette appears unchanged. Worsening bibasilar  predominant airspace disease. No definite effusion. No pneumothorax or  subdiaphragmatic free air. Impression  Worsening bibasilar predominant airspace disease.                      Jeff Hatch MD, M.D.            10/9/2021, 8:19 AM

## 2021-10-09 NOTE — DEATH NOTES
Death Pronouncement Note  Patient's Name: Joni Eaton   Patient's YOB: 1965  MRN Number: [id number]    Admitting Provider: Kenna Gifford DO  Attending Provider: Brigida Thornton MD    Patient was examined and the following were absent: Pulses, Blood Pressure and Respiratory effort    I declared the patient dead on 10/9/2021 at 21 [phone]     Preliminary Cause of Death:   Acute hypoxic respiratory failure due to Covid pneumonia    Electronically signed by Mukul Morales MD on 10/9/21 at 6:17 PM EDT

## 2021-10-09 NOTE — PROGRESS NOTES
Pt desats in the low 60`s. Manually ventilate with ambu bag. Pulse ox increased to low 80`s. Pt placed back on mechanical ventilation with previous settings. Will continue to monitor.   Electronically signed by Yosvany Gross RCP on 10/9/21

## 2021-10-09 NOTE — PROCEDURES
PATIENT NAME: Farheen Quintero RECORD NO. [id number]  DATE: 10/9/2021      PROCEDURE PERFORMED:  Right Internal Jugular Vein Central Line Insertion  ANESTHESIA:  Local utilizing 1% lidocaine  ESTIMATED BLOOD LOSS:  Less than 5 ml  COMPLICATIONS:  None immediately appreciated. Consent: obtained explaining indications, benefits, possible complications and alternatives explained. PROCEDURE:  A timeout was initiated by the bedside nurse and was confirmed by those present. The patient was placed in a supine position. The skin overlying the Right Internal Jugular Vein was prepped with chlorhexadine and draped in sterile fashion. The skin was infiltrated with local anesthetic. Through the anesthetized region, the introducer needle was inserted into the internal jugular vein returning dark red non pulsatile blood. A guidewire was placed through the center of the needle with no resistance. A small incision made in the skin with a #11 scalpel blade. The dilator was inserted into the skin and vein over guidewire using Seldinger technique. The dilator was then removed and Vas catheter was placed in the vein over the guidewire using Seldinger technique. The guidewire was then removed and all ports aspirated and flushed appropriately. The catheter then secured using silk suture and a temporary sterile dressing was applied. No immediate complication was evident. All sponge, instrument and needle counts were correct at the completion of the procedure. Postprocedural chest x-ray showed good position of the catheter with no evidence of hemothorax or pneumothorax. The patient tolerated the procedure well with no immediate complication evident.      Arelis Ventura MD  10/9/2021, 1:37 PM · Acute on CKD III with baseline approximately 1 5  · Nephrology is following  Creatinine is improving  Urine output significantly improved on lasix infusion  Continue lasix gtt per nephrology recommendations  · Trend renal indices and monitor intake and output    · Urine output goal of 1-2 l negative per 24 hours

## 2021-10-09 NOTE — PROGRESS NOTES
Order to begin CRRT on patient via Dr. Annette Rawls. Dr. Peter Villagomez at bedside to insert vascath for CRRT. Vascath inserted and CXR verify placement. Pt bed elevated to 30 degrees. Pt blood pressure drops to 81/47 (56) O2 82 . Dr. Peter Villagomez at bedside. Roland-synephrine and levophed maxed, vasopressin also infusing. BP continues to drop to 54/31 (37) O2 74. 1 amp Bicarb given and 1g calcium chloride given IV push, verbal order per Dr. Peter Villagomez who remains at bedside. Pt blood pressure increases. Pt short run of V-Tach as Dr. Peter Villagomez returns to bedside pt's heart rate normalizes. CRRT to be started if patient is able to tolerate. MD aware.

## 2021-10-09 NOTE — PROGRESS NOTES
1920: Report received from [facility] RN's. Medication handoff completed with Monae MAZA. Pt sedated and paralyzed on vent. All  IV medications infusing as ordered via PICC; will titrate as indicated. In SR per monitor  2000: Initial assessment complete--see flow sheets. Position slightly shifted in bed; unable to turn d/t hemodynamic instability with position changes. 2158: Drop in BP noted to 08'Q systolic. Roland-synephrine titrated  2205: BP continues to drop, in SB per monitor. SpO2 rapidly dropping; now 70%. Code cart to room. Roland at max, epi and levo restarted. Will adjust as needed. RT paged and notified of hypoxia  2210: Messages sent to Dr. Dionisio Parker and Dr. Jemma Eduardo re: rapidly declining condition. RT at bedside bagging pt.  2220: Dr. Netta Mccarty at bedside. SpO2 improving with RT intervention. Rapid vasopressor titration to support B/P  2225: Pt placed back on vent with PEEP at 12. SpO2 87%. B/P stable on roland and levo. Epi now off.   2300:  Weaning vasopressors. SpO2 steady at 80-81%  0130: SpO2 78%. Hypotensive, titrating pressors  0400: Hypothermic, temp 94 ax. Mistral heating blanket applied  0545: Temp improving with warming blanket. UOP poor overnight; known renal failure, nephrology following  0620: Rapid decompensation: SpO2 60's, SBP 50's. Meds titrated and RT at bedside to bag pt. PCXR ordered to [facility] for acute process  0635: PCXR complete. SpO2 78-80%.  Weaning vasopressors  0715: Report given to [facility]'s

## 2021-10-09 NOTE — PROGRESS NOTES
Patient's belongings given to  Guillermina Erazo. Belongings include wedding rings, wallet, phone, , and clothes.

## 2021-10-09 NOTE — PROGRESS NOTES
Hospitalist Progress Note      PCP: Татьяна Cramer    Date of Admission: 9/21/2021    Subjective: pt maxed out on pressors, cont to be hypotensive, renal fn worsening and placed vasc cath to initiate CRRT    Medications:  Reviewed    Infusion Medications    prismaSATE BGK 4/2.5      prismaSATE BGK 4/2.5      prismaSATE BGK 4/2.5      EPINEPHrine infusion      sodium chloride      dextrose 5 % and 0.45 % NaCl      phenylephrine (RICHELLE-SYNEPHRINE) 50mg/250mL infusion 100 mcg/min (10/09/21 1456)    vasopressin (Septic Shock) infusion 0.04 Units/min (10/09/21 0826)    insulin 0.63 Units/hr (10/09/21 1432)    sodium bicarbonate in sterile water infusion 100 mL/hr at 10/09/21 1454    propofol 30 mcg/kg/min (10/09/21 1153)    HYDROmorphone 10 mg (10/08/21 2315)    cisatracurium (NIMBEX) infusion 5 mcg/kg/min (10/09/21 1448)    midazolam 6 mg/hr (10/09/21 0856)    norepinephrine 15 mcg/min (10/09/21 1458)    sodium chloride      dextrose       Scheduled Medications    voriconazole  200 mg Oral 2 times per day    heparin (porcine)  5,000 Units SubCUTAneous 3 times per day    famotidine (PEPCID) injection  20 mg IntraVENous Daily    cefepime  1,000 mg IntraVENous Q24H    dexamethasone  6 mg IntraVENous Q24H    chlorhexidine  15 mL Mouth/Throat BID    lubrifresh P.M.    Both Eyes 6 times per day    sodium chloride flush  5-40 mL IntraVENous 2 times per day    fluticasone  2 puff Inhalation BID    aspirin  81 mg Oral Daily    [Held by provider] carvedilol  3.125 mg Oral Daily    fluticasone  2 spray Nasal Daily    traZODone  50 mg Oral Nightly    [Held by provider] atorvastatin  10 mg Oral Daily    [Held by provider] pregabalin  200 mg Oral BID     PRN Meds: potassium chloride, magnesium sulfate, calcium gluconate **OR** calcium gluconate **OR** calcium gluconate **OR** calcium gluconate, sodium phosphate IVPB **OR** sodium phosphate IVPB **OR** sodium phosphate IVPB **OR** sodium phosphate IVPB, sodium chloride, dextrose, dextrose 5 % and 0.45 % NaCl, sodium chloride flush, sodium chloride, sodium chloride, albuterol sulfate HFA, ondansetron **OR** ondansetron, acetaminophen **OR** acetaminophen, glucose, dextrose, glucagon (rDNA), dextrose      Intake/Output Summary (Last 24 hours) at 10/9/2021 1556  Last data filed at 10/9/2021 1200  Gross per 24 hour   Intake 3291.54 ml   Output 340 ml   Net 2951.54 ml       Physical Exam Performed:    /61   Pulse 91   Temp 99 °F (37.2 °C) (Axillary)   Resp 30   Ht 5' 3\" (1.6 m)   Wt 210 lb 15.7 oz (95.7 kg)   SpO2 (!) 81%   BMI 37.37 kg/m²        General appearance: intubated  HEENT Normal cephalic, atraumatic without obvious deformity. Pupils equal, round, and reactive to light.  Extra ocular muscles intact. Conjunctivae/corneas clear. Neck: Supple, No jugular venous distention/bruits. Trachea midline without thyromegaly or adenopathy with full range of motion. Lungs: diminished throughout, basal crepts  Heart: Regular rhythm, tachy with Normal S1/S2  Abdomen: Soft, non-tender or non-distended without rigidity or guarding and positive bowel sounds  Extremities: No clubbing, cyanosis, or edema bilaterally. Skin: Skin color, texture, turgor normal. No rashes or lesions. Neurologic: intubated, cannot be assessed  Mental status: intubated, cannot be assessed       Labs:   Recent Labs     10/07/21  0400 10/07/21  0400 10/08/21  0527 10/08/21  0527 10/08/21  0646 10/08/21  1546 10/09/21  0347   WBC 19.3*  --  14.4*  --   --   --  12.1*   HGB 8.9*   < > 6.9*   < > 6.7* 7.4* 7.9*   HCT 27.9*   < > 21.3*   < > 20.6* 22.0* 23.3*     --  197  --   --   --  143    < > = values in this interval not displayed.      Recent Labs     10/07/21  0400 10/07/21  0949 10/08/21  0527 10/09/21  0347 10/09/21  1217   NA  --  130* 133* 131*  --    K  --  5.0 4.9 4.7  --    CL  --  94* 93* 88*  --    CO2  --  17* 19* 22  --    BUN  --  53* 64* 75*  -- CREATININE  --  2.4* 3.6* 4.1*  --    CALCIUM  --  7.8* 8.0* 7.0*  --    PHOS   < >  --  9.8* 10.5* 10.5*    < > = values in this interval not displayed. Recent Labs     10/07/21  0400 10/08/21  0527 10/09/21  0347   AST 4,646* 1,608* 752*   ALT 2,151* 1,322* 962*   BILITOT 1.4* 1.7* 2.8*   ALKPHOS 218* 149* 173*     No results for input(s): INR in the last 72 hours. No results for input(s): [place] Pears in the last 72 hours. Urinalysis:      Lab Results   Component Value Date    NITRU Negative 09/21/2021    WBCUA 1 09/21/2021    BACTERIA 1+ 09/21/2021    RBCUA 2 09/21/2021    BLOODU Negative 09/21/2021    SPECGRAV >1.030 09/21/2021    GLUCOSEU >=1000 09/21/2021       Radiology:  XR CHEST PORTABLE   Final Result   Right jugular central venous catheter in place with tip in the SVC      Otherwise, unchanged chest demonstrating bilateral airspace disease         XR CHEST PORTABLE   Final Result   Supportive tubing is in stable position. Persistent bilateral airspace disease. Questionable for right pleural effusion. XR CHEST PORTABLE   Final Result   Stable bilateral perihilar and bibasilar ill-defined consolidation consistent   with alveolar edema versus pneumonia plus or minus mild bilateral pleural   effusions. Mild-to-moderate cardiomegaly. XR CHEST PORTABLE   Final Result   Supportive tubing projects in normal position. Vascular congestion. Bibasilar airspace disease, pulmonary edema versus atelectasis or pneumonia. Possible small pleural effusions. XR CHEST PORTABLE   Final Result   Endotracheal tube tip is 4 cm above the stevie. Appropriate position of the nasogastric tube with side hole well beyond the   GE junction. Persistent diffuse bilateral airspace disease. XR CHEST PORTABLE   Final Result   Worsening bibasilar predominant airspace disease.          XR CHEST PORTABLE   Final Result   Interval improvement in bibasilar opacities consistent with improving   pneumonia. XR CHEST PORTABLE   Final Result   Bibasilar opacities are favored to represent pneumonia.                  Assessment/Plan:    Active Hospital Problems    Diagnosis     COVID-19 virus infection [U07.1]     Septic shock ([address] [A41.9, R65.21]     Acute renal failure with acute cortical necrosis (HCC) [N17.1]     Acute respiratory distress syndrome (ARDS) due to COVID-19 virus (HCC) [U07.1, J80]     Acute respiratory failure with hypoxia (HCC) [J96.01]     Dyspnea [R06.00]     Pneumonia due to COVID-19 virus [U07.1, J12.82]        COVID-19 pneumonia/ARDS/sepsis/shock  Decadron weaned down to 6 mg --> was off-->now on 6mg  Completed Remdesivir 5 days, baricitnib day 14/14  lovenox bid for anticoagulation  Encouraged IS and proning  Elevated 1 3 beta D glucan, V fend per ID  ID consulted  Pt transferred to ICU 10/5, intubated 10/6  10/6 - Tried to paralyze and prone - but pt not tolerating proned  Pt maxed out on pressors     Acute respiratory failure with hypoxia  Titrate oxygen to keep saturations above 90%  Likely secondary to COVID-19 pneumonia  On 80-->100 fiO2 and 60L O2  Place on BIPAP-->less responsive-->intubated 10/6     Serum Beta D glucan +ve - urine histo, aspergillus Ag, fungal serologies, sputum Cx; oral Vfend per ID - holding for now 2/2 shock liver     Transaminitis - suspect 2/2 shock liver, cont to monitor LFT, slowly improving     ARF with hyperkalemia and metabolic acidosis - creat worsened from 0.8-->2.4-->3.6-->4.1, suspect pre-renal from sepsis/shock/hypotension, nephrology consulted. Vasc cath placed and plan on starting CRRT     Anemia - hb dropped from 8.9-->6.7, blood transfusion today, recheck hb after transfusion staying at 7.9     Hypertension - now hypotensive  BP soft, holding coreg, dced losartan     Hyperlipidemia  Continue home medications     Uncontrolled diabetes mellitus type 2  Hold home medications  On Basal and ISS - Adjust PRN - decadron related   Carb controlled diet     Constipation - added miralax         Diet: Diet NPO  Code Status: Full Code    PT/OT Eval Status: ordered    Dispo - cont care, guarded prognosis    Hannah Melendez MD

## 2021-10-09 NOTE — PROGRESS NOTES
Patients blood pressure dropping again. See flowsheet. Dr. Noe Pallas at bedside.   Family was called to let them know that patient is doing very poorly

## 2021-10-09 NOTE — PROGRESS NOTES
abdomen  EXTREMITIES:  No cyanosis or clubbing. SKIN: Warm and dry. No significant rashes      LABS:   RFP:   Recent Labs     10/07/21  0400 10/07/21  0400 10/07/21  0949 10/08/21  0527 10/09/21  0347   *   < > 130* 133* 131*   K 6.3*   < > 5.0 4.9 4.7   CL 89*   < > 94* 93* 88*   CO2 15*   < > 17* 19* 22   BUN 50*   < > 53* 64* 75*   CREATININE 2.4*   < > 2.4* 3.6* 4.1*   CALCIUM 8.1*   < > 7.8* 8.0* 7.0*   MG 2.70*  --   --  2.40 2.20   PHOS 9.2*  --   --  9.8* 10.5*   GFRAA 25*   < > 25* 16* 13*    < > = values in this interval not displayed. Liver panel:  Recent Labs     10/07/21  0400 10/08/21  0527 10/09/21  0347   AST 4,646* 1,608* 752*   ALT 2,151* 1,322* 962*       Endocrine:   @[id number](VitD,PTH,TSH,aldosterone,renin activity,cortisol,metanephrine)@    CBC:   Recent Labs     10/07/21  0400 10/07/21  0400 10/08/21  0527 10/08/21  0527 10/08/21  0646 10/08/21  1546 10/09/21  0347   WBC 19.3*  --  14.4*  --   --   --  12.1*   HGB 8.9*   < > 6.9*   < > 6.7* 7.4* 7.9*   HCT 27.9*   < > 21.3*   < > 20.6* 22.0* 23.3*   MCV 94.4  --  91.8  --   --   --  90.4     --  197  --   --   --  143    < > = values in this interval not displayed. ASSESSMENT and PLAN:     1. OLEGARIO: likely secondary to ATN in the setting of COVID infection              - Will ask ICU team to place a HD catheter and will initiate CRRT. D/W Dr Sydney Sequeira     2. Acute respiratory failure/COVID infection: intubated, on vent, Rx per pulmonary, hospitalist, and ID     3. Hyperkalemia: improved, will keep under control with HD    4. Metabolic acidosis: improved, will keep under control with HD              5. Severe hyperphosphatemia: will correct with RRT                         6. Anemia:              - Transfuse prn for Hb <7    Prognosis is poor.     I spent 35min of critical care, reviewing the records, examining the patient, writing orders and discussing the plan of care with the ICU team        Signed By: Karin Cherry, MD, MD

## 2021-10-10 NOTE — PROGRESS NOTES
2018- Family departed from bedside. Post mortem care completed. All belongings taken home with family. Security notified for pickup. 2027- Security here for pickup. No pt belongings left in room.

## 2021-10-12 LAB
BLOOD CULTURE, ROUTINE: NORMAL
CULTURE, BLOOD 2: NORMAL

## 2021-10-15 NOTE — DISCHARGE SUMMARY
Hospital Medicine Discharge Summary    Patient ID: Candy Sawyer      Patient's PCP: Paty Draft Date: 2021     Discharge Date:pt  on  10/9/2021 at [address] [place] [address]    Admitting Physician: Xena Stein DO     Discharge Physician: Miguel Angel Mccarthy MD     Discharge Diagnoses: Active Hospital Problems    Diagnosis     COVID-19 virus infection [U07.1]     Septic shock ([address] [A41.9, R65.21]     Acute renal failure with acute cortical necrosis (HCC) [N17.1]     Acute respiratory distress syndrome (ARDS) due to COVID-19 virus (HCC) [U07.1, J80]     Acute respiratory failure with hypoxia (HCC) [J96.01]     Dyspnea [R06.00]     Pneumonia due to COVID-19 virus [U07.1, J12.82]        The patient was seen and examined on day of discharge and this discharge summary is in conjunction with any daily progress note from day of discharge. Hospital Course: The patient is a 60 y.o. female who presents to [facility] with shortness of breath. Patient states that she has been feeling symptomatic since . She works at an [employer] and comes in contact with numerous people she states many people at work have been diagnosed with COVID-19 recently. In the emergency department patient was found to be afebrile, tachypneic, tachycardic and saturating 75% on room air. Labs revealed an elevated glucose at 371. Chest x-ray shows bibasilar opacities. Patient tested positive for COVID-19 in the emergency department. Started on Decadron. Pharmacy consulted to dose remdesivir along with baricitinib. While in the ER she desatted on 6 L and required Vapotherm up to 50 L at one point. I was able to titrate the patient down to 20 L.       COVID-19 pneumonia/ARDS/sepsis/shock  Decadron weaned down to 6 mg --> was off-->now on 6mg  Completed Remdesivir 5 days, baricitnib day 14/14  lovenox bid for anticoagulation  Encouraged IS and proning  Elevated 1 3 beta D glucan, V fend per ID  ID consulted  Pt transferred to ICU 10/5, intubated 10/6  10/6 - Tried to paralyze and prone - but pt not tolerating proned  Pt maxed out on pressors     Acute respiratory failure with hypoxia  Titrate oxygen to keep saturations above 90%  Likely secondary to COVID-19 pneumonia  On 80-->100 fiO2 and 60L O2  Place on BIPAP-->less responsive-->intubated 10/6     Serum Beta D glucan +ve - urine histo, aspergillus Ag, fungal serologies, sputum Cx; oral Vfend per ID - holding for now 2/2 shock liver     Transaminitis - suspect 2/2 shock liver, cont to monitor LFT, slowly improving     ARF with hyperkalemia and metabolic acidosis - creat worsened from 0.8-->2.4-->3.6-->4.1, suspect pre-renal from sepsis/shock/hypotension, nephrology consulted. Vasc cath placed and plan on starting CRRT     Anemia - hb dropped from 8.9-->6.7, blood transfusion today, recheck hb after transfusion staying at 7.9     Hypertension - now hypotensive  BP soft, holding coreg, dced losartan     Hyperlipidemia  Continue home medications     Uncontrolled diabetes mellitus type 2  Hold home medications  On Basal and ISS - Adjust PRN - decadron related   Carb controlled diet     Constipation - added miralax     Pt cont to decline, BP dropped and was started on pressors and had maxed out on 3 pressor support. O2 sats dropped on full vent support. Pt coded with pulseless bradycardia, unable to achieve ROSC inspite of resuscitative measures. Pt  on 10/9/21 at 1748      Time Spent on discharge is more than 30 minutes in the examination, evaluation, counseling and review of medications and discharge plan. Signed:    Rosalina Coleman MD   10/15/2021      Thank you Lynda Rodgers for the opportunity to be involved in this patient's care. If you have any questions or concerns please feel free to contact me at [phone].